# Patient Record
Sex: FEMALE | Race: WHITE | NOT HISPANIC OR LATINO | URBAN - METROPOLITAN AREA
[De-identification: names, ages, dates, MRNs, and addresses within clinical notes are randomized per-mention and may not be internally consistent; named-entity substitution may affect disease eponyms.]

---

## 2017-10-17 ENCOUNTER — INPATIENT (INPATIENT)
Facility: HOSPITAL | Age: 82
LOS: 1 days | Discharge: HOME | End: 2017-10-19
Attending: HOSPITALIST | Admitting: HOSPITALIST
Payer: MEDICARE

## 2017-10-17 VITALS
DIASTOLIC BLOOD PRESSURE: 45 MMHG | TEMPERATURE: 98 F | HEART RATE: 92 BPM | OXYGEN SATURATION: 98 % | SYSTOLIC BLOOD PRESSURE: 136 MMHG | RESPIRATION RATE: 18 BRPM

## 2017-10-17 DIAGNOSIS — I10 ESSENTIAL (PRIMARY) HYPERTENSION: ICD-10-CM

## 2017-10-17 DIAGNOSIS — E03.9 HYPOTHYROIDISM, UNSPECIFIED: ICD-10-CM

## 2017-10-17 DIAGNOSIS — Z29.9 ENCOUNTER FOR PROPHYLACTIC MEASURES, UNSPECIFIED: ICD-10-CM

## 2017-10-17 DIAGNOSIS — D63.0 ANEMIA IN NEOPLASTIC DISEASE: ICD-10-CM

## 2017-10-17 DIAGNOSIS — D64.9 ANEMIA, UNSPECIFIED: ICD-10-CM

## 2017-10-17 DIAGNOSIS — Z90.5 ACQUIRED ABSENCE OF KIDNEY: Chronic | ICD-10-CM

## 2017-10-17 LAB
ALBUMIN SERPL ELPH-MCNC: 3.7 G/DL — SIGNIFICANT CHANGE UP (ref 3.3–5)
ALP SERPL-CCNC: 67 U/L — SIGNIFICANT CHANGE UP (ref 40–120)
ALT FLD-CCNC: 15 U/L — SIGNIFICANT CHANGE UP (ref 4–33)
ANISOCYTOSIS BLD QL: SLIGHT — SIGNIFICANT CHANGE UP
APPEARANCE UR: CLEAR — SIGNIFICANT CHANGE UP
APTT BLD: 24.7 SEC — LOW (ref 27.5–37.4)
AST SERPL-CCNC: 16 U/L — SIGNIFICANT CHANGE UP (ref 4–32)
BASOPHILS # BLD AUTO: 0.02 K/UL — SIGNIFICANT CHANGE UP (ref 0–0.2)
BASOPHILS NFR BLD AUTO: 0.3 % — SIGNIFICANT CHANGE UP (ref 0–2)
BILIRUB SERPL-MCNC: 0.3 MG/DL — SIGNIFICANT CHANGE UP (ref 0.2–1.2)
BILIRUB UR-MCNC: NEGATIVE — SIGNIFICANT CHANGE UP
BLD GP AB SCN SERPL QL: NEGATIVE — SIGNIFICANT CHANGE UP
BLOOD UR QL VISUAL: NEGATIVE — SIGNIFICANT CHANGE UP
BUN SERPL-MCNC: 23 MG/DL — SIGNIFICANT CHANGE UP (ref 7–23)
CALCIUM SERPL-MCNC: 8.6 MG/DL — SIGNIFICANT CHANGE UP (ref 8.4–10.5)
CHLORIDE SERPL-SCNC: 101 MMOL/L — SIGNIFICANT CHANGE UP (ref 98–107)
CO2 SERPL-SCNC: 21 MMOL/L — LOW (ref 22–31)
COLOR SPEC: SIGNIFICANT CHANGE UP
CREAT SERPL-MCNC: 0.73 MG/DL — SIGNIFICANT CHANGE UP (ref 0.5–1.3)
ELLIPTOCYTES BLD QL SMEAR: SLIGHT — SIGNIFICANT CHANGE UP
EOSINOPHIL # BLD AUTO: 0.2 K/UL — SIGNIFICANT CHANGE UP (ref 0–0.5)
EOSINOPHIL NFR BLD AUTO: 2.8 % — SIGNIFICANT CHANGE UP (ref 0–6)
FERRITIN SERPL-MCNC: 7.46 NG/ML — LOW (ref 15–150)
GLUCOSE SERPL-MCNC: 83 MG/DL — SIGNIFICANT CHANGE UP (ref 70–99)
GLUCOSE UR-MCNC: NEGATIVE — SIGNIFICANT CHANGE UP
HCT VFR BLD CALC: 21 % — CRITICAL LOW (ref 34.5–45)
HGB BLD-MCNC: 5.6 G/DL — CRITICAL LOW (ref 11.5–15.5)
HYPOCHROMIA BLD QL: SIGNIFICANT CHANGE UP
IMM GRANULOCYTES # BLD AUTO: 0.05 # — SIGNIFICANT CHANGE UP
IMM GRANULOCYTES NFR BLD AUTO: 0.7 % — SIGNIFICANT CHANGE UP (ref 0–1.5)
INR BLD: 1.04 — SIGNIFICANT CHANGE UP (ref 0.88–1.17)
IRON SATN MFR SERPL: 10 UG/DL — LOW (ref 30–160)
IRON SATN MFR SERPL: 427 UG/DL — SIGNIFICANT CHANGE UP (ref 140–530)
KETONES UR-MCNC: NEGATIVE — SIGNIFICANT CHANGE UP
LEUKOCYTE ESTERASE UR-ACNC: SIGNIFICANT CHANGE UP
LYMPHOCYTES # BLD AUTO: 1.38 K/UL — SIGNIFICANT CHANGE UP (ref 1–3.3)
LYMPHOCYTES # BLD AUTO: 19 % — SIGNIFICANT CHANGE UP (ref 13–44)
MANUAL SMEAR VERIFICATION: SIGNIFICANT CHANGE UP
MCHC RBC-ENTMCNC: 17.8 PG — LOW (ref 27–34)
MCHC RBC-ENTMCNC: 26.7 % — LOW (ref 32–36)
MCV RBC AUTO: 66.7 FL — LOW (ref 80–100)
MICROCYTES BLD QL: SIGNIFICANT CHANGE UP
MONOCYTES # BLD AUTO: 0.71 K/UL — SIGNIFICANT CHANGE UP (ref 0–0.9)
MONOCYTES NFR BLD AUTO: 9.8 % — SIGNIFICANT CHANGE UP (ref 2–14)
MUCOUS THREADS # UR AUTO: SIGNIFICANT CHANGE UP
NEUTROPHILS # BLD AUTO: 4.91 K/UL — SIGNIFICANT CHANGE UP (ref 1.8–7.4)
NEUTROPHILS NFR BLD AUTO: 67.4 % — SIGNIFICANT CHANGE UP (ref 43–77)
NITRITE UR-MCNC: NEGATIVE — SIGNIFICANT CHANGE UP
NRBC # FLD: 0 — SIGNIFICANT CHANGE UP
NT-PROBNP SERPL-SCNC: 486.4 PG/ML — SIGNIFICANT CHANGE UP
PH UR: 6.5 — SIGNIFICANT CHANGE UP (ref 4.6–8)
PLATELET # BLD AUTO: 298 K/UL — SIGNIFICANT CHANGE UP (ref 150–400)
PLATELET COUNT - ESTIMATE: NORMAL — SIGNIFICANT CHANGE UP
PMV BLD: 10.5 FL — SIGNIFICANT CHANGE UP (ref 7–13)
POLYCHROMASIA BLD QL SMEAR: SLIGHT — SIGNIFICANT CHANGE UP
POTASSIUM SERPL-MCNC: 4.4 MMOL/L — SIGNIFICANT CHANGE UP (ref 3.5–5.3)
POTASSIUM SERPL-SCNC: 4.4 MMOL/L — SIGNIFICANT CHANGE UP (ref 3.5–5.3)
PROT SERPL-MCNC: 6.8 G/DL — SIGNIFICANT CHANGE UP (ref 6–8.3)
PROT UR-MCNC: NEGATIVE — SIGNIFICANT CHANGE UP
PROTHROM AB SERPL-ACNC: 11.7 SEC — SIGNIFICANT CHANGE UP (ref 9.8–13.1)
RBC # BLD: 3.15 M/UL — LOW (ref 3.8–5.2)
RBC # FLD: 18.4 % — HIGH (ref 10.3–14.5)
RBC CASTS # UR COMP ASSIST: SIGNIFICANT CHANGE UP (ref 0–?)
RETICS #: 0.1 10X6/UL — HIGH (ref 0.02–0.07)
RETICS/RBC NFR: 2.3 % — SIGNIFICANT CHANGE UP (ref 0.5–2.5)
RH IG SCN BLD-IMP: POSITIVE — SIGNIFICANT CHANGE UP
SCHISTOCYTES BLD QL AUTO: SLIGHT — SIGNIFICANT CHANGE UP
SODIUM SERPL-SCNC: 139 MMOL/L — SIGNIFICANT CHANGE UP (ref 135–145)
SP GR SPEC: 1.01 — SIGNIFICANT CHANGE UP (ref 1–1.03)
SQUAMOUS # UR AUTO: SIGNIFICANT CHANGE UP
TROPONIN T SERPL-MCNC: < 0.06 NG/ML — SIGNIFICANT CHANGE UP (ref 0–0.06)
UIBC SERPL-MCNC: 417 UG/DL — HIGH (ref 110–370)
UROBILINOGEN FLD QL: NORMAL E.U. — SIGNIFICANT CHANGE UP (ref 0.1–0.2)
WBC # BLD: 7.27 K/UL — SIGNIFICANT CHANGE UP (ref 3.8–10.5)
WBC # FLD AUTO: 7.27 K/UL — SIGNIFICANT CHANGE UP (ref 3.8–10.5)
WBC UR QL: HIGH (ref 0–?)

## 2017-10-17 PROCEDURE — 71010: CPT | Mod: 26

## 2017-10-17 PROCEDURE — 99223 1ST HOSP IP/OBS HIGH 75: CPT

## 2017-10-17 RX ORDER — LEVOTHYROXINE SODIUM 125 MCG
88 TABLET ORAL DAILY
Qty: 0 | Refills: 0 | Status: DISCONTINUED | OUTPATIENT
Start: 2017-10-17 | End: 2017-10-19

## 2017-10-17 RX ORDER — PANTOPRAZOLE SODIUM 20 MG/1
40 TABLET, DELAYED RELEASE ORAL
Qty: 0 | Refills: 0 | Status: DISCONTINUED | OUTPATIENT
Start: 2017-10-17 | End: 2017-10-18

## 2017-10-17 NOTE — ED PROVIDER NOTE - OBJECTIVE STATEMENT
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 Ornelas ([facility]) for Hgb of 3.5. Denies chest pain, occasional palpitations s/p using inhaler. Has had anemia in past (~1 yr ago), was transfused at that time. +Lightheadedness, intermittent. No headache, abd pain. No dysuria or hematuria. Pt did note black stool one week ago, none since then (stool guiac at PMD neg). 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 Ornelas ([facility]) for Hgb of 3.5. Denies chest pain, occasional palpitations s/p using inhaler. Has had anemia in past (~1 yr ago), was transfused at that time. +Lightheadedness, intermittent. No headache, abd pain. No dysuria or hematuria. Pt did note black stool one week ago, none since then (stool guiac at PMD neg). Intermittent calf and shoulder "aches," no unilateral pain, no hx of PE or DVT, no LE swelling.

## 2017-10-17 NOTE — H&P ADULT - PROBLEM SELECTOR PLAN 1
likely multifactorial  - added reticulocytes, iron, TIBC, ferritin to previous labs  - recheck CBC in am after 2 units PRBC transfusion

## 2017-10-17 NOTE — H&P ADULT - NSHPLABSRESULTS_GEN_ALL_CORE
10-17    139  |  101  |  23  ----------------------------<  83  4.4   |  21<L>  |  0.73    Ca    8.6      17 Oct 2017 17:32    TPro  6.8  /  Alb  3.7  /  TBili  0.3  /  DBili  x   /  AST  16  /  ALT  15  /  AlkPhos  67  10-17                            5.6    7.27  )-----------( 298      ( 17 Oct 2017 17:32 )             21.0       CARDIAC MARKERS ( 17 Oct 2017 17:32 )  x     / < 0.06 ng/mL / x     / x     / x            PT/INR - ( 17 Oct 2017 17:32 )   PT: 11.7 SEC;   INR: 1.04          PTT - ( 17 Oct 2017 17:32 )  PTT:24.7 SEC

## 2017-10-17 NOTE — H&P ADULT - HISTORY OF PRESENT ILLNESS
80 y/o F with hypothyroidism, HTN, and RCC s/p L nephrectomy in 2007, and recent lung mets not on active treatment p/w fatigue.  Pt reports 1 month of worsening generalized weakness and fatigue.  Symptoms have been worse in the past few days along with some dyspnea.  Pt has not had chest pain or, lightheadedness.  Had 1 episode of dark stool a few days ago, but none further.  No hematuria or other bleeding.  Has had similar symptoms in the setting of anemia in the past 2 years.  Pt saw her PMD earlier today, and had labs checked which showed anemia.      In ED, pt had Hgb of 5.9, and transfusion with 2u PRBC initiated.

## 2017-10-17 NOTE — ED ADULT TRIAGE NOTE - CHIEF COMPLAINT QUOTE
Pt sent from doctor's office for hemoglobin of 3. States she feels very weak and sob. Hx of blood transfusions in past. Denies any active bleeding.

## 2017-10-17 NOTE — ED ADULT NURSE REASSESSMENT NOTE - NS ED NURSE REASSESS COMMENT FT1
pt tolerating blood transfusion without any adverse reactions. safety maintained. vs as noted. pt denies any changes in condition or symptoms. family remains at bedside. report to AMADA Montague.
1st unit PRBC's initiated to transfuse over 3 hours, unit number [id number]. pt educated on s/s of transfusion to report to RN. pt verbalized understanding. ivl site remains clean, dry, intact, patent. safety maintained. cardiac monitor in place in nsr. family at bedside. will continue to observe

## 2017-10-17 NOTE — H&P ADULT - ASSESSMENT
80 y/o F with HTN, hypothyroidism, and RCC with lung mets p/w fatigue and dyspnea in the setting of anemia.

## 2017-10-17 NOTE — H&P ADULT - NSHPPHYSICALEXAM_GEN_ALL_CORE
Vital Signs Last 24 Hrs  T(C): 36.9 (17 Oct 2017 20:40), Max: 36.9 (17 Oct 2017 20:25)  T(F): 98.5 (17 Oct 2017 20:40), Max: 98.5 (17 Oct 2017 20:40)  HR: 90 (17 Oct 2017 20:40) (89 - 92)  BP: 123/62 (17 Oct 2017 20:40) (123/62 - 176/46)  BP(mean): --  RR: 18 (17 Oct 2017 20:40) (18 - 18)  SpO2: 100% (17 Oct 2017 20:40) (97% - 100%)    PHYSICAL EXAM:  GENERAL: No Acute Distress  HEAD:  Atraumatic, Normocephalic  EYES: conjunctiva and sclera clear  ENMT: Moist mucous membranes   NECK: Supple  CHEST/LUNG: Clear to auscultation bilaterally  HEART: Regular rate and rhythm; No murmurs, rubs, or gallops  ABDOMEN: Soft, Nontender, Nondistended; Bowel sounds normal  EXTREMITIES:   No clubbing, cyanosis, or pedal edema  VASCULAR: Normal DP pulses  PSYCH: Normal Affect, Normal Behavior  NEUROLOGY:   - Mental status A&O x 3,  SKIN: No rashes or lesions  MUSCULOSKELETAL: No joint swelling

## 2017-10-17 NOTE — ED ADULT NURSE NOTE - OBJECTIVE STATEMENT
received pt to room 16 sent by PMD for hgb of 3, sent for blood transfusion with c/o generalized weakness, fatigue, dyspnea on exertion x 1 month, worsening. pt presents awake a&ox4, denies dizziness or ha. skin warm, dry, appropriate for race. respirations even unlabored. denies cp. endorses sob with exertion and fatiguing easily. abdomen soft nontender nondistended. denies n/v/d denies fevers or chills. ivl placed, bloods drawn and sent. cardiac monitor in place in nsr. safety maintained. will continue to observe.

## 2017-10-17 NOTE — H&P ADULT - NSHPREVIEWOFSYSTEMS_GEN_ALL_CORE
REVIEW OF SYSTEMS    General:  weakness, fatigue  Skin/Breast:  Negative  Ophthalmologic:  Negative  ENMT:  Negative  Respiratory and Thorax:  dyspnea  Cardiovascular:  Negative  Gastrointestinal:  dark stool  Genitourinary:  Negative  Musculoskeletal:  Negative  Neurological:  Negative  Psychiatric:  Negative  Hematology/Lymphatics:  Negative	  Endocrine:	  Negative  Allergic/Immunologic:	 Negative

## 2017-10-17 NOTE — ED PROVIDER NOTE - PHYSICAL EXAMINATION
Attending Mitzy PE: NAD, NCAT, MMM, Trachea midline, +Conjunctival pallor, lungs CTAB, S1/S2 RRR, 2+ rad pulses bilat, Abdomen Soft, NTND, No rebound/guarding, No LE edema, No deformity of extremities, No rashes,  No focal motor or sensory deficits.

## 2017-10-17 NOTE — ED PROVIDER NOTE - MEDICAL DECISION MAKING DETAILS
81F with pmh RCC and anemia presents for Hgb 3.5, weakness, SOB. With occasional palpitations but no CP. Dark stool last week none since. Sx consistent with symptomatic anemia, will check labs for electrolyte abnormality, check troponin and probnp, coags, type and screen, perform rectal exam and guiac, transfuse as indicated. much lower suspicion of acs, chf, pneumonia, PE. plan for likely admit. 81F with pmh RCC and anemia presents for Hgb 3.5, weakness, SOB. With occasional palpitations but no CP. Dark stool last week none since. Sx consistent with symptomatic anemia, will check labs for electrolyte abnormality, check troponin and probnp, coags, type and screen, perform rectal exam and guiac, transfuse as indicated. much lower suspicion of acs, chf, no chest pain, pneumonia, PE. plan for likely admit. - Mic Forrester MD

## 2017-10-18 DIAGNOSIS — K92.1 MELENA: ICD-10-CM

## 2017-10-18 DIAGNOSIS — D50.8 OTHER IRON DEFICIENCY ANEMIAS: ICD-10-CM

## 2017-10-18 DIAGNOSIS — C64.9 MALIGNANT NEOPLASM OF UNSPECIFIED KIDNEY, EXCEPT RENAL PELVIS: ICD-10-CM

## 2017-10-18 LAB
BUN SERPL-MCNC: 19 MG/DL — SIGNIFICANT CHANGE UP (ref 7–23)
CALCIUM SERPL-MCNC: 8.3 MG/DL — LOW (ref 8.4–10.5)
CHLORIDE SERPL-SCNC: 105 MMOL/L — SIGNIFICANT CHANGE UP (ref 98–107)
CO2 SERPL-SCNC: 23 MMOL/L — SIGNIFICANT CHANGE UP (ref 22–31)
CREAT SERPL-MCNC: 0.7 MG/DL — SIGNIFICANT CHANGE UP (ref 0.5–1.3)
GLUCOSE SERPL-MCNC: 90 MG/DL — SIGNIFICANT CHANGE UP (ref 70–99)
HCT VFR BLD CALC: 28.1 % — LOW (ref 34.5–45)
HCT VFR BLD CALC: 28.7 % — LOW (ref 34.5–45)
HGB BLD-MCNC: 8.3 G/DL — LOW (ref 11.5–15.5)
HGB BLD-MCNC: 8.5 G/DL — LOW (ref 11.5–15.5)
MAGNESIUM SERPL-MCNC: 2 MG/DL — SIGNIFICANT CHANGE UP (ref 1.6–2.6)
MCHC RBC-ENTMCNC: 20.5 PG — LOW (ref 27–34)
MCHC RBC-ENTMCNC: 21 PG — LOW (ref 27–34)
MCHC RBC-ENTMCNC: 28.9 % — LOW (ref 32–36)
MCHC RBC-ENTMCNC: 30.2 % — LOW (ref 32–36)
MCV RBC AUTO: 69.4 FL — LOW (ref 80–100)
MCV RBC AUTO: 71 FL — LOW (ref 80–100)
NRBC # FLD: 0 — SIGNIFICANT CHANGE UP
NRBC # FLD: 0 — SIGNIFICANT CHANGE UP
OB PNL STL: POSITIVE — SIGNIFICANT CHANGE UP
PLATELET # BLD AUTO: 286 K/UL — SIGNIFICANT CHANGE UP (ref 150–400)
PLATELET # BLD AUTO: 291 K/UL — SIGNIFICANT CHANGE UP (ref 150–400)
PMV BLD: 10.4 FL — SIGNIFICANT CHANGE UP (ref 7–13)
PMV BLD: 10.4 FL — SIGNIFICANT CHANGE UP (ref 7–13)
POTASSIUM SERPL-MCNC: 4.4 MMOL/L — SIGNIFICANT CHANGE UP (ref 3.5–5.3)
POTASSIUM SERPL-SCNC: 4.4 MMOL/L — SIGNIFICANT CHANGE UP (ref 3.5–5.3)
RBC # BLD: 4.04 M/UL — SIGNIFICANT CHANGE UP (ref 3.8–5.2)
RBC # BLD: 4.05 M/UL — SIGNIFICANT CHANGE UP (ref 3.8–5.2)
RBC # FLD: 19.4 % — HIGH (ref 10.3–14.5)
RBC # FLD: 19.6 % — HIGH (ref 10.3–14.5)
SODIUM SERPL-SCNC: 141 MMOL/L — SIGNIFICANT CHANGE UP (ref 135–145)
WBC # BLD: 7.28 K/UL — SIGNIFICANT CHANGE UP (ref 3.8–10.5)
WBC # BLD: 8.15 K/UL — SIGNIFICANT CHANGE UP (ref 3.8–10.5)
WBC # FLD AUTO: 7.28 K/UL — SIGNIFICANT CHANGE UP (ref 3.8–10.5)
WBC # FLD AUTO: 8.15 K/UL — SIGNIFICANT CHANGE UP (ref 3.8–10.5)

## 2017-10-18 RX ORDER — PANTOPRAZOLE SODIUM 20 MG/1
40 TABLET, DELAYED RELEASE ORAL
Qty: 0 | Refills: 0 | Status: DISCONTINUED | OUTPATIENT
Start: 2017-10-18 | End: 2017-10-19

## 2017-10-18 RX ORDER — IRON SUCROSE 20 MG/ML
200 INJECTION, SOLUTION INTRAVENOUS ONCE
Qty: 0 | Refills: 0 | Status: COMPLETED | OUTPATIENT
Start: 2017-10-18 | End: 2017-10-18

## 2017-10-18 RX ADMIN — PANTOPRAZOLE SODIUM 40 MILLIGRAM(S): 20 TABLET, DELAYED RELEASE ORAL at 17:28

## 2017-10-18 RX ADMIN — Medication 88 MICROGRAM(S): at 06:05

## 2017-10-18 RX ADMIN — PANTOPRAZOLE SODIUM 40 MILLIGRAM(S): 20 TABLET, DELAYED RELEASE ORAL at 06:05

## 2017-10-18 RX ADMIN — IRON SUCROSE 110 MILLIGRAM(S): 20 INJECTION, SOLUTION INTRAVENOUS at 17:28

## 2017-10-18 NOTE — PROGRESS NOTE ADULT - PROBLEM SELECTOR PLAN 3
Diagnosed 2007. s/p nephrectomy. With recurrent and lung metastatics 2009.   Did not tolerate chemo, decided to be conservative. ( passed away and she did not want to go through chemo).   Doesn't want RT or chemo at this time. Requests conservative treatment.  She will f/u with Dr. Escobar (PCP).

## 2017-10-18 NOTE — PROGRESS NOTE ADULT - SUBJECTIVE AND OBJECTIVE BOX
Patient is a 81y old  Female who presents with a chief complaint of fatigue (17 Oct 2017 21:17)      SUBJECTIVE / OVERNIGHT EVENTS:  Pt seen and examined at bedside. Feels well. s/p PRBC transfusion.   No chest pain, no shortness of breath.   No overnight event.   No N/V/D. No abdominal pain.   + melena a few days ago. no recent BM.       Vital Signs Last 24 Hrs  T(C): 36.7 (18 Oct 2017 11:01), Max: 36.9 (17 Oct 2017 20:25)  T(F): 98 (18 Oct 2017 11:01), Max: 98.5 (17 Oct 2017 20:40)  HR: 81 (18 Oct 2017 11:01) (81 - 94)  BP: 130/54 (18 Oct 2017 11:01) (115/68 - 176/46)  BP(mean): --  RR: 18 (18 Oct 2017 11:01) (18 - 18)  SpO2: 99% (18 Oct 2017 11:01) (95% - 100%)  I&O's Summary    17 Oct 2017 07:01  -  18 Oct 2017 07:00  --------------------------------------------------------  IN: 0 mL / OUT: 0 mL / NET: 0 mL    18 Oct 2017 07:01  -  18 Oct 2017 12:11  --------------------------------------------------------  IN: 0 mL / OUT: 251 mL / NET: -251 mL        PHYSICAL EXAM:  GENERAL: NAD, Comfortable  HEAD:  Atraumatic, Normocephalic  EYES: EOMI, PERRLA, conjunctiva and sclera clear  NECK: Supple, No JVD  CHEST/LUNG: Clear to auscultation bilaterally; No wheeze  HEART: Regular rate and rhythm; No murmurs, rubs, or gallops  ABDOMEN: Soft, Nontender, Nondistended; Bowel sounds present  Neuro: AAO x 3, no focal deficit, 5/5 b/l extremities  EXTREMITIES:  2+ Peripheral Pulses, No clubbing, cyanosis, or edema  SKIN: No rashes or lesions    LABS:                        8.3    7.28  )-----------( 291      ( 18 Oct 2017 07:30 )             28.7     10-    141  |  105  |  19  ----------------------------<  90  4.4   |  23  |  0.70    Ca    8.3<L>      18 Oct 2017 07:30  Mg     2.0     10-18    TPro  6.8  /  Alb  3.7  /  TBili  0.3  /  DBili  x   /  AST  16  /  ALT  15  /  AlkPhos  67  10-17    PT/INR - ( 17 Oct 2017 17:32 )   PT: 11.7 SEC;   INR: 1.04          PTT - ( 17 Oct 2017 17:32 )  PTT:24.7 SEC  CAPILLARY BLOOD GLUCOSE        CARDIAC MARKERS ( 17 Oct 2017 17:32 )  x     / < 0.06 ng/mL / x     / x     / x          Urinalysis Basic - ( 17 Oct 2017 23:09 )    Color: PLYEL / Appearance: CLEAR / S.012 / pH: 6.5  Gluc: NEGATIVE / Ketone: NEGATIVE  / Bili: NEGATIVE / Urobili: NORMAL E.U.   Blood: NEGATIVE / Protein: NEGATIVE / Nitrite: NEGATIVE   Leuk Esterase: TRACE / RBC: 0-2 / WBC 5-10   Sq Epi: OCC / Non Sq Epi: x / Bacteria: x        RADIOLOGY & ADDITIONAL TESTS:    Imaging Personally Reviewed:  [x] YES  [ ] NO    Consultant(s) Notes Reviewed:  [x] YES  [ ] NO      MEDICATIONS  (STANDING):  levothyroxine 88 MICROGram(s) Oral daily  pantoprazole    Tablet 40 milliGRAM(s) Oral before breakfast    MEDICATIONS  (PRN):      Care Discussed with Consultants/Other Providers [x] YES  [ ] NO    HEALTH ISSUES - PROBLEM Dx:  Prophylactic measure: Prophylactic measure  Essential (primary) hypertension: Essential (primary) hypertension  Hypothyroidism, unspecified type: Hypothyroidism, unspecified type  Anemia in neoplastic disease: Anemia in neoplastic disease

## 2017-10-18 NOTE — PROGRESS NOTE ADULT - PROBLEM SELECTOR PLAN 1
likely multifactorial  - very iron deficient. Will give IV iron.   - Post transfusion CBC appropriate.  - Hx of ulcer. +occult stool. Will consult GI Dr. Reynolds.

## 2017-10-18 NOTE — CONSULT NOTE ADULT - PROBLEM SELECTOR RECOMMENDATION 2
- Diagnosed 2007. s/p nephrectomy. With recurrent and lung metastatics 2009.   - Did not tolerate chemo, decided to be conservative  - supportive care - noted on lab work   - IV iron while admitted  - consider heme eval

## 2017-10-18 NOTE — CONSULT NOTE ADULT - PROBLEM SELECTOR RECOMMENDATION 9
- pt has h/o Gastric Ulcer on EGD 1 year ago   - recent episode of melena as outpatient  - s/p 2 units PRBC during admission; daily cbc  - monitor stool color for further episodes  - protonix 40mg IVP BID; cahnge to PO on d/c  - carafate qid  - avoid NSAIDS  - NPO p MN for EGD tomorrow, 10/19 with Dr. Reynolds

## 2017-10-18 NOTE — CONSULT NOTE ADULT - ASSESSMENT
82 y/o F with hypothyroidism, HTN, and RCC s/p L nephrectomy in 2007, and recent lung mets not on active treatment p/w fatigue and reported 1 episode of melena about a week ago after increased ibuprofen She reports that she had this one other time about 1 year ago for which she had an EGD about 1 year ago and was noted to have a gastric ulcer

## 2017-10-18 NOTE — CONSULT NOTE ADULT - PROBLEM SELECTOR RECOMMENDATION 3
- Diagnosed 2007. s/p nephrectomy. With recurrent and lung metastatics 2009.   - Did not tolerate chemo, decided to be conservative  - supportive care

## 2017-10-18 NOTE — CONSULT NOTE ADULT - SUBJECTIVE AND OBJECTIVE BOX
Chief Complaint:  Patient is a 81y old  Female who presents with a chief complaint of fatigue (17 Oct 2017 21:17)    Essential (primary) hypertension  Hypothyroidism, unspecified type  Renal cancer  History of nephrectomy     HPI:  80 y/o F with hypothyroidism, HTN, and RCC s/p L nephrectomy in , and recent lung mets not on active treatment p/w fatigue.  Pt reports 1 month of worsening generalized weakness and fatigue.  Symptoms have been worse in the past few days along with some dyspnea.  Pt has not had chest pain or, lightheadedness.  Had 1 episode of dark stool a few days ago, but none further. She reports increased use of Ibuprofen x 3-4 days about 1 weeks ago and that is when she noted the black stools. She reports that she had this one other time about 1 year ago for which she had an EGD about 1 year ago and was noted to have a gastric ulcer. denies n/v/d/c, abdominal pain, brbpr.     In ED, pt had Hgb of 5.9, and transfusion with 2u PRBC initiated. (17 Oct 2017 21:17)      No Known Allergies      iron sucrose IVPB 200 milliGRAM(s) IV Intermittent once  levothyroxine 88 MICROGram(s) Oral daily  pantoprazole    Tablet 40 milliGRAM(s) Oral before breakfast        FAMILY HISTORY:  No pertinent family history in first degree relatives        Review of Systems:    General:  No wt loss, fevers, chills, night sweats, fatigue  Eyes:  Good vision, no reported pain  ENT:  No sore throat, pain, runny nose, dysphagia  CV:  No pain, palpitations, no lightheadedness  Resp:  No dyspnea, cough, tachypnea, wheezing  GI: melena; denies n/v/d/c, abdominal pain, brbpr   :  No pain, bleeding, incontinence, nocturia  Muscle:  No pain, weakness  Neuro:  No weakness, tingling, memory problems  Psych:  No fatigue, insomnia, mood problems, depression  Endocrine:  No polyuria, polydypsia, cold/heat intolerance  Heme:  No petechiae, ecchymosis, easy bruisability  Skin:  No rash, tattoos, scars, edema    Relevant Family History:   n/c    Relevant Social History: n/c      Physical Exam:    Vital Signs:  Vital Signs Last 24 Hrs  T(C): 36.7 (18 Oct 2017 11:01), Max: 36.9 (17 Oct 2017 20:25)  T(F): 98 (18 Oct 2017 11:01), Max: 98.5 (17 Oct 2017 20:40)  HR: 81 (18 Oct 2017 11:01) (81 - 94)  BP: 130/54 (18 Oct 2017 11:01) (115/68 - 176/46)  BP(mean): --  RR: 18 (18 Oct 2017 11:01) (18 - 18)  SpO2: 99% (18 Oct 2017 11:01) (95% - 100%)  Daily Height in cm: 149.86 (17 Oct 2017 22:55)    Daily     General:  Appears stated age, well-groomed, nad  HEENT:  NC/AT,  conjunctivae clear and pink, no thyromegaly, nodules, adenopathy, no JVD  Chest:  Full & symmetric excursion, no increased effort, breath sounds clear  Cardiovascular:  Regular rhythm, S1, S2, no murmur/rub/S3/S4, no abdominal bruit, no edema  Abdomen:  Soft, non-tender, non-distended, normoactive bowel sounds,  no masses ,no hepatosplenomeagaly, no signs of chronic liver disease  Extremities:  no cyanosis,clubbing or edema  Skin:  No rash/erythema/ecchymoses/petechiae/wounds/abscess/warm/dry  Neuro/Psych:  A&Ox3  , no asterixis, no tremor, no encephalopathy    Laboratory:                            8.3    7.28  )-----------( 291      ( 18 Oct 2017 07:30 )             28.7     10-18    141  |  105  |  19  ----------------------------<  90  4.4   |  23  |  0.70    Ca    8.3<L>      18 Oct 2017 07:30  Mg     2.0     10-18    TPro  6.8  /  Alb  3.7  /  TBili  0.3  /  DBili  x   /  AST  16  /  ALT  15  /  AlkPhos  67  10-17    LIVER FUNCTIONS - ( 17 Oct 2017 17:32 )  Alb: 3.7 g/dL / Pro: 6.8 g/dL / ALK PHOS: 67 u/L / ALT: 15 u/L / AST: 16 u/L / GGT: x           PT/INR - ( 17 Oct 2017 17:32 )   PT: 11.7 SEC;   INR: 1.04          PTT - ( 17 Oct 2017 17:32 )  PTT:24.7 SEC  Urinalysis Basic - ( 17 Oct 2017 23:09 )    Color: PLYEL / Appearance: CLEAR / S.012 / pH: 6.5  Gluc: NEGATIVE / Ketone: NEGATIVE  / Bili: NEGATIVE / Urobili: NORMAL E.U.   Blood: NEGATIVE / Protein: NEGATIVE / Nitrite: NEGATIVE   Leuk Esterase: TRACE / RBC: 0-2 / WBC 5-10   Sq Epi: OCC / Non Sq Epi: x / Bacteria: x        Imaging:

## 2017-10-19 ENCOUNTER — RESULT REVIEW (OUTPATIENT)
Age: 82
End: 2017-10-19

## 2017-10-19 VITALS
OXYGEN SATURATION: 95 % | TEMPERATURE: 98 F | HEART RATE: 83 BPM | DIASTOLIC BLOOD PRESSURE: 51 MMHG | SYSTOLIC BLOOD PRESSURE: 132 MMHG | RESPIRATION RATE: 20 BRPM

## 2017-10-19 LAB
BUN SERPL-MCNC: 19 MG/DL — SIGNIFICANT CHANGE UP (ref 7–23)
CALCIUM SERPL-MCNC: 8.4 MG/DL — SIGNIFICANT CHANGE UP (ref 8.4–10.5)
CHLORIDE SERPL-SCNC: 104 MMOL/L — SIGNIFICANT CHANGE UP (ref 98–107)
CO2 SERPL-SCNC: 22 MMOL/L — SIGNIFICANT CHANGE UP (ref 22–31)
CREAT SERPL-MCNC: 0.82 MG/DL — SIGNIFICANT CHANGE UP (ref 0.5–1.3)
GLUCOSE SERPL-MCNC: 94 MG/DL — SIGNIFICANT CHANGE UP (ref 70–99)
HCT VFR BLD CALC: 28.5 % — LOW (ref 34.5–45)
HGB BLD-MCNC: 8.5 G/DL — LOW (ref 11.5–15.5)
MCHC RBC-ENTMCNC: 20.9 PG — LOW (ref 27–34)
MCHC RBC-ENTMCNC: 29.8 % — LOW (ref 32–36)
MCV RBC AUTO: 70 FL — LOW (ref 80–100)
NRBC # FLD: 0 — SIGNIFICANT CHANGE UP
PLATELET # BLD AUTO: 261 K/UL — SIGNIFICANT CHANGE UP (ref 150–400)
PMV BLD: 10.2 FL — SIGNIFICANT CHANGE UP (ref 7–13)
POTASSIUM SERPL-MCNC: 4.4 MMOL/L — SIGNIFICANT CHANGE UP (ref 3.5–5.3)
POTASSIUM SERPL-SCNC: 4.4 MMOL/L — SIGNIFICANT CHANGE UP (ref 3.5–5.3)
RBC # BLD: 4.07 M/UL — SIGNIFICANT CHANGE UP (ref 3.8–5.2)
RBC # FLD: 20.1 % — HIGH (ref 10.3–14.5)
SODIUM SERPL-SCNC: 139 MMOL/L — SIGNIFICANT CHANGE UP (ref 135–145)
WBC # BLD: 7.4 K/UL — SIGNIFICANT CHANGE UP (ref 3.8–10.5)
WBC # FLD AUTO: 7.4 K/UL — SIGNIFICANT CHANGE UP (ref 3.8–10.5)

## 2017-10-19 PROCEDURE — 88312 SPECIAL STAINS GROUP 1: CPT | Mod: 26

## 2017-10-19 PROCEDURE — 88305 TISSUE EXAM BY PATHOLOGIST: CPT | Mod: 26

## 2017-10-19 RX ORDER — FERROUS SULFATE 325(65) MG
1 TABLET ORAL
Qty: 30 | Refills: 0 | OUTPATIENT
Start: 2017-10-19 | End: 2017-11-18

## 2017-10-19 RX ORDER — ESOMEPRAZOLE MAGNESIUM 40 MG/1
1 CAPSULE, DELAYED RELEASE ORAL
Qty: 0 | Refills: 0 | COMMUNITY

## 2017-10-19 RX ORDER — PANTOPRAZOLE SODIUM 20 MG/1
1 TABLET, DELAYED RELEASE ORAL
Qty: 60 | Refills: 0 | OUTPATIENT
Start: 2017-10-19 | End: 2017-11-18

## 2017-10-19 RX ADMIN — PANTOPRAZOLE SODIUM 40 MILLIGRAM(S): 20 TABLET, DELAYED RELEASE ORAL at 06:10

## 2017-10-19 RX ADMIN — Medication 88 MICROGRAM(S): at 06:10

## 2017-10-19 NOTE — PROGRESS NOTE ADULT - PROBLEM SELECTOR PLAN 3
Diagnosed 2007. s/p nephrectomy. With recurrent and lung metastatics 2009.   Did not tolerate chemo, decided to be conservative.   Doesn't want RT or chemo at this time. Requests conservative treatment.  She will f/u with Dr. Escobar (PCP).

## 2017-10-19 NOTE — DISCHARGE NOTE ADULT - SECONDARY DIAGNOSIS.
Hypothyroidism, unspecified type Renal cell cancer, unspecified laterality Essential (primary) hypertension

## 2017-10-19 NOTE — DISCHARGE NOTE ADULT - CARE PROVIDER_API CALL
Christopher Reynolds (DO), Gastroenterology; Internal Medicine  [address] [place] [id number]  Phone: [phone]  Fax: [phone]    Dr. Escobar,   Phone: [phone]  Fax: (   )    -

## 2017-10-19 NOTE — PROGRESS NOTE ADULT - PROBLEM SELECTOR PLAN 1
likely multifactorial  - very iron deficient. s/p IV iron. Will supplement via PO post procedure.   - Post transfusion CBC appropriate.  - Hx of ulcer. +occult stool. GI Dr. Reynolds.  - Plan for EGD today.

## 2017-10-19 NOTE — DISCHARGE NOTE ADULT - PLAN OF CARE
egd, stable h/h You received two units of PRBC in the hospital. You're hemoglobin was 8.5 on discharge. An EGD was done in the hospital where they found non-bleeding gastric ulcer, a biopsy was taken. Please follow up with Dr. Burger for biposy results and to get a repeat EGD in 8 weeks to assure healing of gastric ulcers. Continue Protonix and iron pills as prescribed.  Please follow up with Dr. Escobar within 1 week for repeat CBC to monitor hemoglobin level. continue synthroid. Follow up with Dr. Escobar [phone]. Continue Verapamil. Please follow up with Dr. Escobar within 1 week as your diastolic blood pressure was low.

## 2017-10-19 NOTE — DISCHARGE NOTE ADULT - MEDICATION SUMMARY - MEDICATIONS TO STOP TAKING
I will STOP taking the medications listed below when I get home from the hospital:    NexIUM 40 mg oral delayed release capsule  -- 1 cap(s) by mouth once a day, As Needed

## 2017-10-19 NOTE — DISCHARGE NOTE ADULT - HOSPITAL COURSE
82 y/o F with hypothyroidism, HTN, and RCC s/p L nephrectomy in 2007, and recent lung mets not on active treatment p/w fatigue, found to be anemic to 5.9    Hospital Course:     Anemia in neoplastic disease.    - likely multifactorial  - very iron deficient. Will give IV iron.   - Post transfusion CBC appropriate.  - Hx of ulcer. +occult stool.   -consulted GI Dr. Reynolds.   -   - Return patient to hospital cid for ongoing care - Resume regular diet.   - Use Protonix (pantoprazole) 40 mg PO BID.   - Await pathology results.   - Repeat the upper endoscopy in 2 months to check  healing.         Hypothyroidism, unspecified type.     - continue synthroid.       Renal cell cancer, unspecified laterality.    - Diagnosed 2007. s/p nephrectomy. With recurrent and lung metastatics 2009.   Did not tolerate chemo, decided to be conservative. ( passed away and she did not want to go through chemo).   -Doesn't want RT or chemo at this time. Requests conservative treatment.  -She will f/u with Dr. Escobar (PCP).      Essential (primary) hypertension.     - restart verapamil (home med).       Prophylactic measure.  Plan: SCD's.       Dispo: Home

## 2017-10-19 NOTE — DISCHARGE NOTE ADULT - MEDICATION SUMMARY - MEDICATIONS TO TAKE
I will START or STAY ON the medications listed below when I get home from the hospital:    verapamil 120 mg/24 hours oral capsule, extended release  -- 1 cap(s) by mouth once a day  -- Indication: For Essential (primary) hypertension    ferrous sulfate 325 mg (65 mg elemental iron) oral tablet  -- 1 tab(s) by mouth once a day   -- Check with your doctor before becoming pregnant.  Do not chew, break, or crush.  May discolor urine or feces.    -- Indication: For Anemia    Protonix 40 mg oral delayed release tablet  -- 1 tab(s) by mouth 2 times a day   -- It is very important that you take or use this exactly as directed.  Do not skip doses or discontinue unless directed by your doctor.  Obtain medical advice before taking any non-prescription drugs as some may affect the action of this medication.  Swallow whole.  Do not crush.    -- Indication: For Melena    Synthroid 88 mcg (0.088 mg) oral tablet  -- 1 tab(s) by mouth once a day  -- Indication: For Hypothyroidism, unspecified type

## 2017-10-19 NOTE — DISCHARGE NOTE ADULT - PATIENT PORTAL LINK FT
“You can access the [facility] Patient Portal, offered by [facility], by registering with the following website: [email or web address]”

## 2017-10-19 NOTE — PROGRESS NOTE ADULT - SUBJECTIVE AND OBJECTIVE BOX
Patient is a 81y old  Female who presents with a chief complaint of fatigue (17 Oct 2017 21:17)      SUBJECTIVE / OVERNIGHT EVENTS:  No overnight events.  Pt feels well.  Denied cp, sob, n/v/d.  no abdominal pain. No HA/dizziness.   No melena.       Vital Signs Last 24 Hrs  T(C): 36.8 (19 Oct 2017 09:20), Max: 37.3 (19 Oct 2017 02:00)  T(F): 98.3 (19 Oct 2017 09:20), Max: 99.2 (19 Oct 2017 02:00)  HR: 81 (19 Oct 2017 09:20) (74 - 85)  BP: 143/72 (19 Oct 2017 09:20) (130/54 - 145/55)  BP(mean): --  RR: 18 (19 Oct 2017 09:20) (17 - 20)  SpO2: 95% (19 Oct 2017 09:20) (95% - 99%)  I&O's Summary    18 Oct 2017 07:01  -  19 Oct 2017 07:00  --------------------------------------------------------  IN: 0 mL / OUT: 251 mL / NET: -251 mL        PHYSICAL EXAM:  GENERAL: NAD, Comfortable  HEAD:  Atraumatic, Normocephalic  EYES: EOMI, PERRLA, conjunctiva and sclera clear  NECK: Supple, No JVD  CHEST/LUNG: Clear to auscultation bilaterally; No wheeze  HEART: Regular rate and rhythm; No murmurs, rubs, or gallops  ABDOMEN: Soft, Nontender, Nondistended; Bowel sounds present  Neuro: AAO x 3, no focal deficit, 5/5 b/l extremities  EXTREMITIES:  2+ Peripheral Pulses, No clubbing, cyanosis, or edema  SKIN: No rashes or lesions    LABS:                        8.5    7.40  )-----------( 261      ( 19 Oct 2017 05:50 )             28.5     10-    139  |  104  |  19  ----------------------------<  94  4.4   |  22  |  0.82    Ca    8.4      19 Oct 2017 05:50  Mg     2.0     10-18    TPro  6.8  /  Alb  3.7  /  TBili  0.3  /  DBili  x   /  AST  16  /  ALT  15  /  AlkPhos  67  10-17    PT/INR - ( 17 Oct 2017 17:32 )   PT: 11.7 SEC;   INR: 1.04          PTT - ( 17 Oct 2017 17:32 )  PTT:24.7 SEC  CAPILLARY BLOOD GLUCOSE        CARDIAC MARKERS ( 17 Oct 2017 17:32 )  x     / < 0.06 ng/mL / x     / x     / x          Urinalysis Basic - ( 17 Oct 2017 23:09 )    Color: PLYEL / Appearance: CLEAR / S.012 / pH: 6.5  Gluc: NEGATIVE / Ketone: NEGATIVE  / Bili: NEGATIVE / Urobili: NORMAL E.U.   Blood: NEGATIVE / Protein: NEGATIVE / Nitrite: NEGATIVE   Leuk Esterase: TRACE / RBC: 0-2 / WBC 5-10   Sq Epi: OCC / Non Sq Epi: x / Bacteria: x        RADIOLOGY & ADDITIONAL TESTS:    Imaging Personally Reviewed:  [x] YES  [ ] NO    Consultant(s) Notes Reviewed:  [x] YES  [ ] NO      MEDICATIONS  (STANDING):  levothyroxine 88 MICROGram(s) Oral daily  pantoprazole  Injectable 40 milliGRAM(s) IV Push two times a day    MEDICATIONS  (PRN):      Care Discussed with Consultants/Other Providers [x] YES  [ ] NO    HEALTH ISSUES - PROBLEM Dx:  Other iron deficiency anemia: Other iron deficiency anemia  Melena: Melena  Renal cell cancer, unspecified laterality: Renal cell cancer, unspecified laterality  Prophylactic measure: Prophylactic measure  Essential (primary) hypertension: Essential (primary) hypertension  Hypothyroidism, unspecified type: Hypothyroidism, unspecified type  Anemia in neoplastic disease: Anemia in neoplastic disease

## 2017-10-19 NOTE — DISCHARGE NOTE ADULT - CARE PLAN
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 You're hemoglobin was 8.5 on discharge. An EGD was done in the hospital where they found non-bleeding gastric ulcer, a biopsy was taken. Please follow up with Dr. Burger for biposy results and to get a repeat EGD in 8 weeks to assure healing of gastric ulcers. Continue Protonix and iron pills as prescribed.  Please follow up with Dr. Escobar within 1 week for repeat CBC to monitor hemoglobin level.  Secondary Diagnosis:	Hypothyroidism, unspecified type  Instructions for follow-up, activity and diet:	continue synthroid.  Secondary Diagnosis:	Renal cell cancer, unspecified laterality  Instructions for follow-up, activity and diet:	Follow up with Dr. Escobar [phone].  Secondary Diagnosis:	Essential (primary) hypertension  Instructions for follow-up, activity and diet:	Continue Verapamil. Please follow up with Dr. Escobar within 1 week as your diastolic blood pressure was low.

## 2017-10-19 NOTE — PROGRESS NOTE ADULT - ASSESSMENT
82 y/o F with HTN, hypothyroidism, and RCC with lung mets p/w fatigue and dyspnea in the setting of anemia.

## 2018-12-06 PROBLEM — E03.9 HYPOTHYROIDISM, UNSPECIFIED: Chronic | Status: ACTIVE | Noted: 2017-10-17

## 2018-12-06 PROBLEM — Z00.00 ENCOUNTER FOR PREVENTIVE HEALTH EXAMINATION: Status: ACTIVE | Noted: 2018-12-06

## 2018-12-06 PROBLEM — I10 ESSENTIAL (PRIMARY) HYPERTENSION: Chronic | Status: ACTIVE | Noted: 2017-10-17

## 2018-12-13 ENCOUNTER — APPOINTMENT (OUTPATIENT)
Dept: NEUROLOGY | Facility: CLINIC | Age: 83
End: 2018-12-13
Payer: COMMERCIAL

## 2018-12-13 ENCOUNTER — INPATIENT (INPATIENT)
Facility: HOSPITAL | Age: 83
LOS: 6 days | Discharge: HOME | DRG: 25 | End: 2018-12-20
Attending: PSYCHIATRY & NEUROLOGY | Admitting: PSYCHIATRY & NEUROLOGY
Payer: MEDICARE

## 2018-12-13 VITALS
HEIGHT: 58 IN | TEMPERATURE: 98 F | DIASTOLIC BLOOD PRESSURE: 85 MMHG | OXYGEN SATURATION: 95 % | HEART RATE: 93 BPM | SYSTOLIC BLOOD PRESSURE: 154 MMHG | RESPIRATION RATE: 18 BRPM | WEIGHT: 145.06 LBS

## 2018-12-13 VITALS
DIASTOLIC BLOOD PRESSURE: 80 MMHG | RESPIRATION RATE: 18 BRPM | HEART RATE: 88 BPM | SYSTOLIC BLOOD PRESSURE: 156 MMHG | OXYGEN SATURATION: 95 %

## 2018-12-13 DIAGNOSIS — G93.9 DISORDER OF BRAIN, UNSPECIFIED: ICD-10-CM

## 2018-12-13 DIAGNOSIS — Z90.5 ACQUIRED ABSENCE OF KIDNEY: Chronic | ICD-10-CM

## 2018-12-13 LAB
ALBUMIN SERPL ELPH-MCNC: 3.9 G/DL — SIGNIFICANT CHANGE UP (ref 3.3–5)
ALP SERPL-CCNC: 93 U/L — SIGNIFICANT CHANGE UP (ref 40–120)
ALT FLD-CCNC: 22 U/L — SIGNIFICANT CHANGE UP (ref 10–45)
ANION GAP SERPL CALC-SCNC: 14 MMOL/L — SIGNIFICANT CHANGE UP (ref 5–17)
APTT BLD: 29 SEC — SIGNIFICANT CHANGE UP (ref 27.5–36.3)
AST SERPL-CCNC: 19 U/L — SIGNIFICANT CHANGE UP (ref 10–40)
BASOPHILS # BLD AUTO: 0.1 K/UL — SIGNIFICANT CHANGE UP (ref 0–0.2)
BASOPHILS NFR BLD AUTO: 0.8 % — SIGNIFICANT CHANGE UP (ref 0–2)
BILIRUB SERPL-MCNC: 0.3 MG/DL — SIGNIFICANT CHANGE UP (ref 0.2–1.2)
BUN SERPL-MCNC: 14 MG/DL — SIGNIFICANT CHANGE UP (ref 7–23)
CALCIUM SERPL-MCNC: 9 MG/DL — SIGNIFICANT CHANGE UP (ref 8.4–10.5)
CHLORIDE SERPL-SCNC: 104 MMOL/L — SIGNIFICANT CHANGE UP (ref 96–108)
CO2 SERPL-SCNC: 22 MMOL/L — SIGNIFICANT CHANGE UP (ref 22–31)
CREAT SERPL-MCNC: 0.81 MG/DL — SIGNIFICANT CHANGE UP (ref 0.5–1.3)
EOSINOPHIL # BLD AUTO: 0.2 K/UL — SIGNIFICANT CHANGE UP (ref 0–0.5)
EOSINOPHIL NFR BLD AUTO: 2.8 % — SIGNIFICANT CHANGE UP (ref 0–6)
GLUCOSE SERPL-MCNC: 128 MG/DL — HIGH (ref 70–99)
HCT VFR BLD CALC: 39.3 % — SIGNIFICANT CHANGE UP (ref 34.5–45)
HGB BLD-MCNC: 12.3 G/DL — SIGNIFICANT CHANGE UP (ref 11.5–15.5)
INR BLD: 1.06 RATIO — SIGNIFICANT CHANGE UP (ref 0.88–1.16)
LYMPHOCYTES # BLD AUTO: 1.6 K/UL — SIGNIFICANT CHANGE UP (ref 1–3.3)
LYMPHOCYTES # BLD AUTO: 20 % — SIGNIFICANT CHANGE UP (ref 13–44)
MCHC RBC-ENTMCNC: 23.8 PG — LOW (ref 27–34)
MCHC RBC-ENTMCNC: 31.4 GM/DL — LOW (ref 32–36)
MCV RBC AUTO: 75.7 FL — LOW (ref 80–100)
MONOCYTES # BLD AUTO: 0.9 K/UL — SIGNIFICANT CHANGE UP (ref 0–0.9)
MONOCYTES NFR BLD AUTO: 11.1 % — SIGNIFICANT CHANGE UP (ref 2–14)
NEUTROPHILS # BLD AUTO: 5.1 K/UL — SIGNIFICANT CHANGE UP (ref 1.8–7.4)
NEUTROPHILS NFR BLD AUTO: 65.3 % — SIGNIFICANT CHANGE UP (ref 43–77)
PLATELET # BLD AUTO: 267 K/UL — SIGNIFICANT CHANGE UP (ref 150–400)
POTASSIUM SERPL-MCNC: 4.3 MMOL/L — SIGNIFICANT CHANGE UP (ref 3.5–5.3)
POTASSIUM SERPL-SCNC: 4.3 MMOL/L — SIGNIFICANT CHANGE UP (ref 3.5–5.3)
PROT SERPL-MCNC: 7.2 G/DL — SIGNIFICANT CHANGE UP (ref 6–8.3)
PROTHROM AB SERPL-ACNC: 12.1 SEC — SIGNIFICANT CHANGE UP (ref 10–12.9)
RBC # BLD: 5.2 M/UL — SIGNIFICANT CHANGE UP (ref 3.8–5.2)
RBC # FLD: 15.7 % — HIGH (ref 10.3–14.5)
SODIUM SERPL-SCNC: 140 MMOL/L — SIGNIFICANT CHANGE UP (ref 135–145)
WBC # BLD: 7.8 K/UL — SIGNIFICANT CHANGE UP (ref 3.8–10.5)
WBC # FLD AUTO: 7.8 K/UL — SIGNIFICANT CHANGE UP (ref 3.8–10.5)

## 2018-12-13 PROCEDURE — 99244 OFF/OP CNSLTJ NEW/EST MOD 40: CPT

## 2018-12-13 PROCEDURE — 70450 CT HEAD/BRAIN W/O DYE: CPT | Mod: 26

## 2018-12-13 PROCEDURE — 99285 EMERGENCY DEPT VISIT HI MDM: CPT

## 2018-12-13 PROCEDURE — 99202 OFFICE O/P NEW SF 15 MIN: CPT

## 2018-12-13 RX ORDER — VERAPAMIL HCL 240 MG
120 CAPSULE, EXTENDED RELEASE PELLETS 24 HR ORAL DAILY
Qty: 0 | Refills: 0 | Status: DISCONTINUED | OUTPATIENT
Start: 2018-12-13 | End: 2018-12-18

## 2018-12-13 RX ORDER — LEVOTHYROXINE SODIUM 125 MCG
88 TABLET ORAL DAILY
Qty: 0 | Refills: 0 | Status: DISCONTINUED | OUTPATIENT
Start: 2018-12-13 | End: 2018-12-18

## 2018-12-13 RX ORDER — CEPHALEXIN 500 MG
1000 CAPSULE ORAL ONCE
Qty: 0 | Refills: 0 | Status: DISCONTINUED | OUTPATIENT
Start: 2018-12-13 | End: 2018-12-13

## 2018-12-13 RX ORDER — DEXAMETHASONE 0.5 MG/5ML
4 ELIXIR ORAL ONCE
Qty: 0 | Refills: 0 | Status: COMPLETED | OUTPATIENT
Start: 2018-12-13 | End: 2018-12-13

## 2018-12-13 RX ORDER — DEXAMETHASONE 0.5 MG/5ML
4 ELIXIR ORAL
Qty: 0 | Refills: 0 | Status: DISCONTINUED | OUTPATIENT
Start: 2018-12-13 | End: 2018-12-18

## 2018-12-13 RX ORDER — ENOXAPARIN SODIUM 100 MG/ML
40 INJECTION SUBCUTANEOUS EVERY 24 HOURS
Qty: 0 | Refills: 0 | Status: DISCONTINUED | OUTPATIENT
Start: 2018-12-13 | End: 2018-12-14

## 2018-12-13 RX ORDER — LEVETIRACETAM 250 MG/1
500 TABLET, FILM COATED ORAL
Qty: 0 | Refills: 0 | Status: DISCONTINUED | OUTPATIENT
Start: 2018-12-13 | End: 2018-12-18

## 2018-12-13 RX ORDER — VERAPAMIL HCL 240 MG
120 CAPSULE, EXTENDED RELEASE PELLETS 24 HR ORAL DAILY
Qty: 0 | Refills: 0 | Status: DISCONTINUED | OUTPATIENT
Start: 2018-12-13 | End: 2018-12-13

## 2018-12-13 RX ORDER — LEVETIRACETAM 250 MG/1
1000 TABLET, FILM COATED ORAL ONCE
Qty: 0 | Refills: 0 | Status: COMPLETED | OUTPATIENT
Start: 2018-12-13 | End: 2018-12-13

## 2018-12-13 RX ADMIN — LEVETIRACETAM 400 MILLIGRAM(S): 250 TABLET, FILM COATED ORAL at 22:57

## 2018-12-13 RX ADMIN — Medication 4 MILLIGRAM(S): at 22:57

## 2018-12-13 NOTE — H&P ADULT - ASSESSMENT
82 year old female presenting with a brain mass seen on outpatient MRI. Patient has PMH of untreated renal cell carcinoma with mets to lungs, hypothyroidism. Patient states she had kidney removed in 2007. Around Thanksgiving, she noticed some weakness in the right leg. PMD ordered brain MRI and referred to Dr. Helm. Since then, weakness has been stable. Today saw Dr. Helm as outpatient and recommended patient be admitted for further workup. Patient denies any numbness, tingling, nausea, vomiting, changes in vision. States she is able to walk but feels weaker in the right leg. Midline frontal dull headache earlier in the day now resolved.  On neuro exam, bilateral hip flexion weakness, right worse than left    L parietal brain mass    Plan:  Follow up official read of CTH  Keppra 500 mg PO BID  Decadron 4 mg PO BID  MRI brain with contrast  After MRI brain, and if repeat labs WNL, will obtain CT C/A/P  Neurosurgery consult with Dr. Thayer  Oncology consult 82 year old female presenting with a brain mass seen on outpatient MRI. Patient has PMH of untreated renal cell carcinoma with mets to lungs, hypothyroidism. Patient states she had kidney removed in 2007. Around Thanksgiving, she noticed some weakness in the right leg. PMD ordered brain MRI and referred to Dr. Helm. Since then, weakness has been stable. Today saw Dr. Helm as outpatient and recommended patient be admitted for further workup. Patient denies any numbness, tingling, nausea, vomiting, changes in vision. States she is able to walk but feels weaker in the right leg. Midline frontal dull headache earlier in the day now resolved.  On neuro exam, bilateral hip flexion weakness, right worse than left    L parietal brain mass    Plan:  Follow up official read of CTH  Keppra 500 mg PO BID  Decadron 4 mg PO BID  MRI brain with contrast  After MRI brain, and if repeat labs WNL, will obtain CT C/A/P  Neurosurgery consult with Dr. Thayer  Oncology consult  Discussed with Dr. Helm

## 2018-12-13 NOTE — H&P ADULT - NSHPPHYSICALEXAM_GEN_ALL_CORE
Neurological Exam:  Mental Status: Orientated to self, date and place.  Attention intact.  No dysarthria, aphasia or neglect.  Knowledge intact.  Registration intact.  Short and long term memory grossly intact.      Cranial Nerves: PERRL, EOMI, VFF, no nystagmus or diplopia.  CN V1-3 intact to light touch. No facial asymmetry.  Hearing intact.  Tongue midline.  Sternocleidomastoid and Trapezius intact bilaterally.    Motor:   Tone: normal            Strength:     Upper extremity                      Delt       Bicep    Tricep                                               R         5/5        5/5        5/5       5/5                                            L          5/5        5/5        5/5       5/5  Lower extremity                       HF          KE          KF        DF         PF                                            R        4/5        5/5        5/5       5/5       5/5                                            L         4+/5        5/5       5/5       5/5        5/5  Pronator drift: none                 Dysmetria: None to finger-nose-finger  No truncal ataxia.    Tremor: No resting, postural or action tremor.  No myoclonus.    Sensation: intact to light touch    Deep Tendon Reflexes: 0 bilateral biceps, triceps, brachioradialis, knee and ankle  Toes mute bilaterally

## 2018-12-13 NOTE — ED PROVIDER NOTE - CARE PLAN
Principal Discharge DX:	Brain mass Principal Discharge DX:	Brain mass  Secondary Diagnosis:	Nontraumatic cortical hemorrhage of left cerebral hemisphere

## 2018-12-13 NOTE — ED PROVIDER NOTE - ATTENDING CONTRIBUTION TO CARE
MD Brown:  patient seen and evaluated with the resident.  I was present for key portions of the History & Physical, and I agree with the Impression & Plan.  MD Brown:  81 yo

## 2018-12-13 NOTE — H&P ADULT - NSHPLABSRESULTS_GEN_ALL_CORE
12.3   7.8   )-----------( 267      ( 13 Dec 2018 21:55 )             39.3     12-13    140  |  104  |  14  ----------------------------<  128<H>  4.3   |  22  |  0.81    Ca    9.0      13 Dec 2018 21:55    TPro  7.2  /  Alb  3.9  /  TBili  0.3  /  DBili  x   /  AST  19  /  ALT  22  /  AlkPhos  93  12-13    PT/INR - ( 13 Dec 2018 21:55 )   PT: 12.1 sec;   INR: 1.06 ratio       PTT - ( 13 Dec 2018 21:55 )  PTT:29.0 sec    Vital Signs Last 24 Hrs  T(C): 36.9 (13 Dec 2018 20:33), Max: 36.9 (13 Dec 2018 20:33)  T(F): 98.4 (13 Dec 2018 20:33), Max: 98.4 (13 Dec 2018 20:33)  HR: 93 (13 Dec 2018 20:33) (93 - 93)  BP: 154/85 (13 Dec 2018 20:33) (154/85 - 154/85)  RR: 18 (13 Dec 2018 20:33) (18 - 18)  SpO2: 95% (13 Dec 2018 20:33) (95% - 95%)

## 2018-12-13 NOTE — H&P ADULT - HISTORY OF PRESENT ILLNESS
Patient is a 82 year old female presenting with a brain mass seen on outpatient MRI. Patient has PMH of untreated renal cell carcinoma with mets to lungs, hypothyroidism. Patient states she had kidney removed in 2007. Around Thanksgiving, she noticed some weakness in the right leg. PMD ordered brain MRI and referred to Dr. Helm. Since then, weakness has been stable. Today saw Dr. Helm as outpatient and recommended patient be admitted for further workup. Patient denies any numbness, tingling, nausea, vomiting, changes in vision. States she is able to walk but feels weaker in the right leg. Midline frontal dull headache earlier in the day now resolved.

## 2018-12-13 NOTE — ED ADULT NURSE NOTE - NSIMPLEMENTINTERV_GEN_ALL_ED
Implemented All Universal Safety Interventions:  [place] to call system. Call bell, personal items and telephone within reach. Instruct patient to call for assistance. Room bathroom lighting operational. Non-slip footwear when patient is off stretcher. Physically safe environment: no spills, clutter or unnecessary equipment. Stretcher in lowest position, wheels locked, appropriate side rails in place.

## 2018-12-13 NOTE — ED PROVIDER NOTE - OBJECTIVE STATEMENT
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 Pt states she received the MRI 11/28 in response to headache/dizziness at that time; these symptoms passed on their own without steroids or other medications, but now her headache is returning. She has mild R sided weakness and numbness that affects her steadiness but currently she is still ambulatory. No dizziness or lightheadedness at this time, no n/v/d, no blurry vision. Pt states headache is in center of head.

## 2018-12-13 NOTE — H&P ADULT - ATTENDING COMMENTS
I performed a history and physical examination of the patient and discussed the management of the patient with the resident. I reviewed the resident's note and agree with the documented findings and plan of care with the following additions/exceptions.    DOS 12/14/18    She feels a little better with right foot weakness s/p steroids. Denies headache.     on exam I didn't detect left leg weakness that was mentioned above. right foot only minor weakness.     she was alert, fluent, gives her fully history. attentive, appropriate social graces, euthymic and reactive. there as no apraxia to 2/2 transitive and 2/2 intransitive gestures. there was no agraphia though her sentence was more of a command "back to school now". she had right/left confusion only on examiner. 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   with dyscalculia testing she understood symbols, she could write numbers to dictation, she got 3/4 correct with magnitude testing- missed the one with multple zeros. trouble reading numbers when multiple zeros in the number- (transcoding issue). She could subtract simple numbers better when she read the problem written for her aloud. She couldn't subtract when it required borrowing.  she could calculate quart in $1.75. 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 She was able to draw a clock, anchored numbers first, knew the hands that fo time 11:10 but they didn't nicely emanate from center properly.      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  needs oncology consult, ct c/a/p. neurosurgery planning for procedure monday so will consult for clearance. mri spine given questionable LMD on brain. I performed a history and physical examination of the patient and discussed the management of the patient with the resident. I reviewed the resident's note and agree with the documented findings and plan of care with the following additions/exceptions.    DOS 12/14/18    She feels a little better with right foot weakness s/p steroids. Denies headache.     on exam I didn't detect left leg weakness that was mentioned above. right foot only minor weakness.     she was alert, fluent, gives her fully history. attentive, appropriate social graces, euthymic and reactive. there as no apraxia to 2/2 transitive and 2/2 intransitive gestures. there was no agraphia though her sentence was more of a command "back to school now". she had right/left confusion only on examiner. 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   with dyscalculia testing she understood symbols, she could write numbers to dictation, she got 3/4 correct with magnitude testing- missed the one with multple zeros. trouble reading numbers when multiple zeros in the number- (transcoding issue). She could subtract simple numbers better when she read the problem written for her aloud. She couldn't subtract when it required borrowing.  she could calculate quart in $1.75. 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 She was able to draw a clock, anchored numbers first, knew the hands that fo time 11:10 but they didn't nicely emanate from center properly.      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  needs oncology consult, ct c/a/p. neurosurgery planning for procedure monday so will consult for clearance. mri spine given questionable LMD on brain.

## 2018-12-13 NOTE — ED ADULT NURSE NOTE - OBJECTIVE STATEMENT
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

## 2018-12-13 NOTE — ED PROVIDER NOTE - MEDICAL DECISION MAKING DETAILS
Pt w RCC w/ mets, known brain mass now w/ new headache followed by neuro-onc Alicja, will obtain CT to assess for expansion/rebleed, neuro consult, cont to reassess

## 2018-12-13 NOTE — ED ADULT TRIAGE NOTE - CHIEF COMPLAINT QUOTE
HA/diff ambulating since yesterday, pt had recent similar symptoms at end of November with MRI that showed mass vs possible resolution of hemorrhage. denies numbness/tingling/n/v/weakness

## 2018-12-14 LAB
ALBUMIN SERPL ELPH-MCNC: 3.5 G/DL — SIGNIFICANT CHANGE UP (ref 3.3–5)
ALP SERPL-CCNC: 82 U/L — SIGNIFICANT CHANGE UP (ref 40–120)
ALT FLD-CCNC: 19 U/L — SIGNIFICANT CHANGE UP (ref 10–45)
ANION GAP SERPL CALC-SCNC: 14 MMOL/L — SIGNIFICANT CHANGE UP (ref 5–17)
APPEARANCE UR: CLEAR — SIGNIFICANT CHANGE UP
AST SERPL-CCNC: 15 U/L — SIGNIFICANT CHANGE UP (ref 10–40)
BACTERIA # UR AUTO: NEGATIVE — SIGNIFICANT CHANGE UP
BASOPHILS # BLD AUTO: 0.01 K/UL — SIGNIFICANT CHANGE UP (ref 0–0.2)
BASOPHILS NFR BLD AUTO: 0.3 % — SIGNIFICANT CHANGE UP (ref 0–2)
BILIRUB SERPL-MCNC: 0.3 MG/DL — SIGNIFICANT CHANGE UP (ref 0.2–1.2)
BILIRUB UR-MCNC: NEGATIVE — SIGNIFICANT CHANGE UP
BLD GP AB SCN SERPL QL: NEGATIVE — SIGNIFICANT CHANGE UP
BUN SERPL-MCNC: 13 MG/DL — SIGNIFICANT CHANGE UP (ref 7–23)
CALCIUM SERPL-MCNC: 8.7 MG/DL — SIGNIFICANT CHANGE UP (ref 8.4–10.5)
CHLORIDE SERPL-SCNC: 105 MMOL/L — SIGNIFICANT CHANGE UP (ref 96–108)
CO2 SERPL-SCNC: 20 MMOL/L — LOW (ref 22–31)
COLOR SPEC: SIGNIFICANT CHANGE UP
CREAT SERPL-MCNC: 0.71 MG/DL — SIGNIFICANT CHANGE UP (ref 0.5–1.3)
DIFF PNL FLD: NEGATIVE — SIGNIFICANT CHANGE UP
EOSINOPHIL # BLD AUTO: 0.02 K/UL — SIGNIFICANT CHANGE UP (ref 0–0.5)
EOSINOPHIL NFR BLD AUTO: 0.5 % — SIGNIFICANT CHANGE UP (ref 0–6)
EPI CELLS # UR: 3 /HPF — SIGNIFICANT CHANGE UP
GLUCOSE SERPL-MCNC: 144 MG/DL — HIGH (ref 70–99)
GLUCOSE UR QL: NEGATIVE — SIGNIFICANT CHANGE UP
HCT VFR BLD CALC: 36.5 % — SIGNIFICANT CHANGE UP (ref 34.5–45)
HGB BLD-MCNC: 11 G/DL — LOW (ref 11.5–15.5)
HYALINE CASTS # UR AUTO: 1 /LPF — SIGNIFICANT CHANGE UP (ref 0–2)
IMM GRANULOCYTES NFR BLD AUTO: 0.3 % — SIGNIFICANT CHANGE UP (ref 0–1.5)
KETONES UR-MCNC: NEGATIVE — SIGNIFICANT CHANGE UP
LEUKOCYTE ESTERASE UR-ACNC: ABNORMAL
LYMPHOCYTES # BLD AUTO: 0.52 K/UL — LOW (ref 1–3.3)
LYMPHOCYTES # BLD AUTO: 13.4 % — SIGNIFICANT CHANGE UP (ref 13–44)
MCHC RBC-ENTMCNC: 23.2 PG — LOW (ref 27–34)
MCHC RBC-ENTMCNC: 30.1 GM/DL — LOW (ref 32–36)
MCV RBC AUTO: 76.8 FL — LOW (ref 80–100)
MONOCYTES # BLD AUTO: 0.09 K/UL — SIGNIFICANT CHANGE UP (ref 0–0.9)
MONOCYTES NFR BLD AUTO: 2.3 % — SIGNIFICANT CHANGE UP (ref 2–14)
NEUTROPHILS # BLD AUTO: 3.24 K/UL — SIGNIFICANT CHANGE UP (ref 1.8–7.4)
NEUTROPHILS NFR BLD AUTO: 83.2 % — HIGH (ref 43–77)
NITRITE UR-MCNC: NEGATIVE — SIGNIFICANT CHANGE UP
PH UR: 6 — SIGNIFICANT CHANGE UP (ref 5–8)
PLATELET # BLD AUTO: 235 K/UL — SIGNIFICANT CHANGE UP (ref 150–400)
POTASSIUM SERPL-MCNC: 4.5 MMOL/L — SIGNIFICANT CHANGE UP (ref 3.5–5.3)
POTASSIUM SERPL-SCNC: 4.5 MMOL/L — SIGNIFICANT CHANGE UP (ref 3.5–5.3)
PROT SERPL-MCNC: 6.5 G/DL — SIGNIFICANT CHANGE UP (ref 6–8.3)
PROT UR-MCNC: NEGATIVE — SIGNIFICANT CHANGE UP
RBC # BLD: 4.75 M/UL — SIGNIFICANT CHANGE UP (ref 3.8–5.2)
RBC # FLD: 16.5 % — HIGH (ref 10.3–14.5)
RBC CASTS # UR COMP ASSIST: 2 /HPF — SIGNIFICANT CHANGE UP (ref 0–4)
RH IG SCN BLD-IMP: POSITIVE — SIGNIFICANT CHANGE UP
SODIUM SERPL-SCNC: 139 MMOL/L — SIGNIFICANT CHANGE UP (ref 135–145)
SP GR SPEC: 1.01 — SIGNIFICANT CHANGE UP (ref 1.01–1.02)
UROBILINOGEN FLD QL: NEGATIVE — SIGNIFICANT CHANGE UP
WBC # BLD: 3.89 K/UL — SIGNIFICANT CHANGE UP (ref 3.8–10.5)
WBC # FLD AUTO: 3.89 K/UL — SIGNIFICANT CHANGE UP (ref 3.8–10.5)
WBC UR QL: 8 /HPF — HIGH (ref 0–5)

## 2018-12-14 PROCEDURE — 99223 1ST HOSP IP/OBS HIGH 75: CPT

## 2018-12-14 PROCEDURE — 99223 1ST HOSP IP/OBS HIGH 75: CPT | Mod: GC

## 2018-12-14 PROCEDURE — 93010 ELECTROCARDIOGRAM REPORT: CPT

## 2018-12-14 PROCEDURE — 70553 MRI BRAIN STEM W/O & W/DYE: CPT | Mod: 26

## 2018-12-14 PROCEDURE — 71260 CT THORAX DX C+: CPT | Mod: 26

## 2018-12-14 PROCEDURE — 74177 CT ABD & PELVIS W/CONTRAST: CPT | Mod: 26

## 2018-12-14 RX ORDER — LEVOTHYROXINE SODIUM 0.09 MG/1
88 TABLET ORAL DAILY
Qty: 30 | Refills: 0 | Status: ACTIVE | COMMUNITY
Start: 2018-12-14

## 2018-12-14 RX ORDER — PANTOPRAZOLE SODIUM 20 MG/1
40 TABLET, DELAYED RELEASE ORAL DAILY
Qty: 0 | Refills: 0 | Status: DISCONTINUED | OUTPATIENT
Start: 2018-12-14 | End: 2018-12-18

## 2018-12-14 RX ORDER — VERAPAMIL HYDROCHLORIDE 120 MG/1
120 TABLET ORAL DAILY
Qty: 30 | Refills: 0 | Status: ACTIVE | COMMUNITY
Start: 2018-12-14

## 2018-12-14 RX ORDER — ENOXAPARIN SODIUM 100 MG/ML
40 INJECTION SUBCUTANEOUS EVERY 24 HOURS
Qty: 0 | Refills: 0 | Status: DISCONTINUED | OUTPATIENT
Start: 2018-12-14 | End: 2018-12-16

## 2018-12-14 RX ADMIN — Medication 4 MILLIGRAM(S): at 05:14

## 2018-12-14 RX ADMIN — PANTOPRAZOLE SODIUM 40 MILLIGRAM(S): 20 TABLET, DELAYED RELEASE ORAL at 17:40

## 2018-12-14 RX ADMIN — LEVETIRACETAM 500 MILLIGRAM(S): 250 TABLET, FILM COATED ORAL at 17:36

## 2018-12-14 RX ADMIN — LEVETIRACETAM 500 MILLIGRAM(S): 250 TABLET, FILM COATED ORAL at 05:14

## 2018-12-14 RX ADMIN — Medication 120 MILLIGRAM(S): at 05:36

## 2018-12-14 RX ADMIN — ENOXAPARIN SODIUM 40 MILLIGRAM(S): 100 INJECTION SUBCUTANEOUS at 05:14

## 2018-12-14 RX ADMIN — Medication 4 MILLIGRAM(S): at 17:36

## 2018-12-14 RX ADMIN — Medication 88 MICROGRAM(S): at 05:14

## 2018-12-14 RX ADMIN — ENOXAPARIN SODIUM 40 MILLIGRAM(S): 100 INJECTION SUBCUTANEOUS at 17:36

## 2018-12-14 NOTE — MEDICAL STUDENT PROGRESS NOTE(EDUCATION) - SUBJECTIVE AND OBJECTIVE BOX
82F with history of renal cell carncioma s/p nephrectomy in 2007 with mets to lungs, hypothyroidism presenting following 1 episode of dizziness and 2 weeks of progressive weakness in the right leg.     Subjective: No acute events overnight. Patient endorses weakness in her right leg, difficulty ambulating. Denies sensory changes, headache, dizziness. Patient states that since Thanksgiving she has had trouble with complex tasks, calculations eg. balancing her checkbook.     Physical Exam:   Vitals: T-36.4, HR-79, BP-124/73, RR-16, SaO2-94  General: Well appearing. No apparent distress.   Resp: Non-labored breathing   Neurological Exam:  	Mental Status: Orientated to self, date and place. No dysarthria, aphasia or neglect.  Knowledge intact.  Registration intact.  Short and long term memory grossly intact. Deficits in attention testing, unable to do serial 7s and spell "world" backwards.   	Cranial Nerves: PERRL, EOMI, VFF, no nystagmus or diplopia.  CN V1-3 intact to light touch. No facial asymmetry.  Hearing intact.  Tongue midline.  Sternocleidomastoid and Trapezius intact bilaterally.  	Motor:   	Tone: normal            	Strength:     	Upper extremity                      Delt       Bicep    Tricep     	                                          R         5/5        5/5        5/5       5/5  	                                          L          5/5        5/5        5/5       5/5  	Lower extremity                       HF          KE          KF        DF         PF  	                                          R        4/5        5/5        5/5       5/5       5/5  	                                          L         4+/5        5/5       5/5       5/5        5/5  	Pronator drift: none                 	Dysmetria: None to finger-nose-finger  	No truncal ataxia.    	Tremor: No resting, postural or action tremor.  No myoclonus.  	Sensation: intact to light touch  	Deep Tendon Reflexes: Left patellar 2+.     Labs:   CMP: CO2-20, Glucose-144   UA- 8 WBCs, large leukocyte esterase, negative bacteria, negative nitrites

## 2018-12-14 NOTE — CONSULT NOTE ADULT - SUBJECTIVE AND OBJECTIVE BOX
p ([id number])     HPI:  Patient is a 82 year old female presenting with a brain mass seen on outpatient MRI. Patient has PMH of untreated renal cell carcinoma with mets to lungs, hypothyroidism. Patient states she had kidney removed in 2007. Around Thanksgiving, she noticed some weakness in the right leg. PMD ordered brain MRI and referred to Dr. Helm. Since then, weakness has been stable. Today saw Dr. Helm as outpatient and recommended patient be admitted for further workup. Patient denies any numbness, tingling, nausea, vomiting, changes in vision.     Imaging:  Large L motor strip cystic mass consistent with probably renal cell carcinoma met    Exam:  AOx3, FC, PERRL, EOMI, no facial   4+/5 R HF but otherwise 5/5 throughout  SILT  no clonus      --Anticoagulation:  enoxaparin Injectable 40 milliGRAM(s) SubCutaneous every 24 hours    =====================  PAST MEDICAL HISTORY   Essential (primary) hypertension  Hypothyroidism, unspecified type  Renal cancer    PAST SURGICAL HISTORY   History of nephrectomy        MEDICATIONS:  Antibiotics:    Neuro:  levETIRAcetam 500 milliGRAM(s) Oral two times a day    Other:  dexamethasone     Tablet 4 milliGRAM(s) Oral two times a day  levothyroxine 88 MICROGram(s) Oral daily  verapamil  milliGRAM(s) Oral daily      SOCIAL HISTORY:   Occupation:   Marital Status:     FAMILY HISTORY:  No pertinent family history in first degree relatives      ROS: Negative except per HPI    LABS:  PT/INR - ( 13 Dec 2018 21:55 )   PT: 12.1 sec;   INR: 1.06 ratio         PTT - ( 13 Dec 2018 21:55 )  PTT:29.0 sec                        12.3   7.8   )-----------( 267      ( 13 Dec 2018 21:55 )             39.3     12-14    139  |  105  |  13  ----------------------------<  144<H>  4.5   |  20<L>  |  0.71    Ca    8.7      14 Dec 2018 06:09    TPro  6.5  /  Alb  3.5  /  TBili  0.3  /  DBili  x   /  AST  15  /  ALT  19  /  AlkPhos  82  12-14

## 2018-12-14 NOTE — CONSULT NOTE ADULT - SUBJECTIVE AND OBJECTIVE BOX
Patient is a 82y old  Female who presents with a chief complaint of Brain mets (14 Dec 2018 10:20)      HPI:  Patient is a 82 year old female presenting with a brain mass seen on outpatient MRI. Patient has PMH of untreated renal cell carcinoma with mets to lungs, hypothyroidism. Patient states she had kidney removed in . Around , she noticed some weakness in the right leg. PMD ordered brain MRI and referred to Dr. Helm. Since then, weakness has been stable. Today saw Dr. Helm as outpatient and recommended patient be admitted for further workup. Patient denies any numbness, tingling, nausea, vomiting, changes in vision. States she is able to walk but feels weaker in the right leg. Midline frontal dull headache earlier in the day now resolved. (13 Dec 2018 23:11)      PAST MEDICAL & SURGICAL HISTORY:  Essential (primary) hypertension  Hypothyroidism, unspecified type  Renal cancer  History of nephrectomy: Left nephrectomy       FAMILY HISTORY:  No pertinent family history in first degree relatives      SOCIAL HISTORY:    Allergies    No Known Allergies    Intolerances        REVIEW OF SYSTEMS    General:	Denies fatigue, fevers, chills, sweats, decreased appetite.    Skin/Breast: denies pruritis, rash  	  Ophthalmologic: no change in vision or blurring  	  HEENT	Denies dry mouth, oral sores, dysphagia,  change in hearing.    Respiratory and Thorax:  cough, sob, wheeze, hemoptysis  	  Cardiovascular:	no cp , palp, orthopnea    Gastrointestinal:	no n/v/d constipation    Genitourinary:	no dysuria of frequency, no hematuria, no flank pain    Musculoskeletal:	no bone or joint pain. no muscle aches.     Neurological:	no change in sensory or motor function. no headache. no weakness.     Psychiatric:	no depression, no anxiety, insomnia.     Hematology/Lymphatics:	no bleeding or bruising    SUBJECTIVE / OVERNIGHT EVENTS:        Vital Signs Last 24 Hrs  T(C): 36.4 (14 Dec 2018 09:17), Max: 36.9 (13 Dec 2018 20:33)  T(F): 97.5 (14 Dec 2018 09:17), Max: 98.4 (13 Dec 2018 20:33)  HR: 79 (14 Dec 2018 09:17) (79 - 93)  BP: 124/73 (14 Dec 2018 09:17) (124/63 - 154/85)  BP(mean): --  RR: 16 (14 Dec 2018 09:17) (16 - 18)  SpO2: 94% (14 Dec 2018 09:17) (94% - 98%)  I&O's Summary    13 Dec 2018 07:01  -  14 Dec 2018 07:00  --------------------------------------------------------  IN: 120 mL / OUT: 0 mL / NET: 120 mL        PHYSICAL EXAM:  GENERAL: NAD, Comfortable  HEAD:  Atraumatic, Normocephalic  EYES: EOMI, PERRLA, conjunctiva and sclera clear  NECK: Supple, No JVD  CHEST/LUNG: Clear to auscultation bilaterally; No wheeze  HEART: Regular rate and rhythm; No murmurs, rubs, or gallops  ABDOMEN: Soft, Nontender, Nondistended; Bowel sounds present  Neuro: AAOx3, no focal deficit, 5/5 b/l extremities  EXTREMITIES:  2+ Peripheral Pulses, No clubbing, cyanosis, or edema  SKIN: No rashes or lesions    LABS:                        12.3   7.8   )-----------( 267      ( 13 Dec 2018 21:55 )             39.3     12-14    139  |  105  |  13  ----------------------------<  144<H>  4.5   |  20<L>  |  0.71    Ca    8.7      14 Dec 2018 06:09    TPro  6.5  /  Alb  3.5  /  TBili  0.3  /  DBili  x   /  AST  15  /  ALT  19  /  AlkPhos  82  12-14    PT/INR - ( 13 Dec 2018 21:55 )   PT: 12.1 sec;   INR: 1.06 ratio         PTT - ( 13 Dec 2018 21:55 )  PTT:29.0 sec  CAPILLARY BLOOD GLUCOSE            Urinalysis Basic - ( 14 Dec 2018 01:29 )    Color: Light Yellow / Appearance: Clear / S.010 / pH: x  Gluc: x / Ketone: Negative  / Bili: Negative / Urobili: Negative   Blood: x / Protein: Negative / Nitrite: Negative   Leuk Esterase: Large / RBC: 2 /hpf / WBC 8 /hpf   Sq Epi: x / Non Sq Epi: 3 /hpf / Bacteria: Negative        RADIOLOGY & ADDITIONAL TESTS:    Imaging Personally Reviewed:  [x] YES  [ ] NO    Consultant(s) Notes Reviewed:  [x] YES  [ ] NO      MEDICATIONS  (STANDING):  dexamethasone     Tablet 4 milliGRAM(s) Oral two times a day  enoxaparin Injectable 40 milliGRAM(s) SubCutaneous every 24 hours  levETIRAcetam 500 milliGRAM(s) Oral two times a day  levothyroxine 88 MICROGram(s) Oral daily  verapamil  milliGRAM(s) Oral daily    MEDICATIONS  (PRN):      Care Discussed with Consultants/Other Providers [x] YES  [ ] NO    HEALTH ISSUES - PROBLEM Dx: Patient is a 82y old  Female who presents with a chief complaint of Brain mets (14 Dec 2018 10:20)      HPI:  Patient is a 82 year old female presenting with a brain mass seen on outpatient MRI. Patient has PMH of untreated renal cell carcinoma with mets to lungs, hypothyroidism. Patient states she had kidney removed in . Around , she noticed some weakness in the right leg. PMD ordered brain MRI and referred to Dr. Helm. Since then, weakness has been stable. Today saw Dr. Helm as outpatient and recommended patient be admitted for further workup. Patient denies any numbness, tingling, nausea, vomiting, changes in vision. States she is able to walk but feels weaker in the right leg. Midline frontal dull headache earlier in the day now resolved. (13 Dec 2018 23:11) - from H&P      PAST MEDICAL & SURGICAL HISTORY:  Essential (primary) hypertension  Hypothyroidism, unspecified type  Renal cancer  History of nephrectomy: Left nephrectomy       FAMILY HISTORY:  No pertinent family history in first degree relatives      SOCIAL HISTORY:    Allergies    No Known Allergies    Intolerances        REVIEW OF SYSTEMS    General:	Denies fatigue, fevers, chills, sweats, decreased appetite.    Skin/Breast: denies pruritis, rash  	  Ophthalmologic: no change in vision or blurring  	  HEENT	Denies dry mouth, oral sores, dysphagia,  change in hearing.    Respiratory and Thorax:  cough, sob, wheeze, hemoptysis  	  Cardiovascular:	no cp , palp, orthopnea    Gastrointestinal:	no n/v/d constipation    Genitourinary:	no dysuria of frequency, no hematuria, no flank pain    Musculoskeletal:	no bone or joint pain. no muscle aches.     Neurological:	no change in sensory or motor function. no headache. no weakness.     Psychiatric:	no depression, no anxiety, insomnia.     Hematology/Lymphatics:	no bleeding or bruising      SUBJECTIVE / OVERNIGHT EVENTS:    pt co dizziness starting around . MRI outpt showed cyst. denies cp sob nvd. states she doesn't walk much but drives to stores and restaurants. does not use walker at home.  currently denies ha dizziness.      Vital Signs Last 24 Hrs  T(C): 36.4 (14 Dec 2018 09:17), Max: 36.9 (13 Dec 2018 20:33)  T(F): 97.5 (14 Dec 2018 09:17), Max: 98.4 (13 Dec 2018 20:33)  HR: 79 (14 Dec 2018 09:17) (79 - 93)  BP: 124/73 (14 Dec 2018 09:17) (124/63 - 154/85)  BP(mean): --  RR: 16 (14 Dec 2018 09:17) (16 - 18)  SpO2: 94% (14 Dec 2018 09:17) (94% - 98%)  I&O's Summary    13 Dec 2018 07:01  -  14 Dec 2018 07:00  --------------------------------------------------------  IN: 120 mL / OUT: 0 mL / NET: 120 mL        PHYSICAL EXAM:  GENERAL: NAD, Comfortable  HEAD:  Atraumatic, Normocephalic  EYES: EOMI, PERRLA, conjunctiva and sclera clear  NECK: Supple, No JVD  CHEST/LUNG: Clear to auscultation bilaterally; No wheeze  HEART: Regular rate and rhythm; No murmurs, rubs, or gallops  ABDOMEN: Soft, Nontender, Nondistended; Bowel sounds present  Neuro: AAOx3, no focal deficit, 5/5 b/l extremities  EXTREMITIES:  2+ Peripheral Pulses, No clubbing, cyanosis, or edema  SKIN: No rashes or lesions    LABS:                        12.3   7.8   )-----------( 267      ( 13 Dec 2018 21:55 )             39.3     12-14    139  |  105  |  13  ----------------------------<  144<H>  4.5   |  20<L>  |  0.71    Ca    8.7      14 Dec 2018 06:09    TPro  6.5  /  Alb  3.5  /  TBili  0.3  /  DBili  x   /  AST  15  /  ALT  19  /  AlkPhos  82  12-14    PT/INR - ( 13 Dec 2018 21:55 )   PT: 12.1 sec;   INR: 1.06 ratio         PTT - ( 13 Dec 2018 21:55 )  PTT:29.0 sec  CAPILLARY BLOOD GLUCOSE            Urinalysis Basic - ( 14 Dec 2018 01:29 )    Color: Light Yellow / Appearance: Clear / S.010 / pH: x  Gluc: x / Ketone: Negative  / Bili: Negative / Urobili: Negative   Blood: x / Protein: Negative / Nitrite: Negative   Leuk Esterase: Large / RBC: 2 /hpf / WBC 8 /hpf   Sq Epi: x / Non Sq Epi: 3 /hpf / Bacteria: Negative        RADIOLOGY & ADDITIONAL TESTS:    Imaging Personally Reviewed:  [x] YES  [ ] NO    Consultant(s) Notes Reviewed:  [x] YES  [ ] NO      MEDICATIONS  (STANDING):  dexamethasone     Tablet 4 milliGRAM(s) Oral two times a day  enoxaparin Injectable 40 milliGRAM(s) SubCutaneous every 24 hours  levETIRAcetam 500 milliGRAM(s) Oral two times a day  levothyroxine 88 MICROGram(s) Oral daily  verapamil  milliGRAM(s) Oral daily    MEDICATIONS  (PRN):      Care Discussed with Consultants/Other Providers [x] YES  [ ] NO    HEALTH ISSUES - PROBLEM Dx:

## 2018-12-14 NOTE — PROGRESS NOTE ADULT - ASSESSMENT
Ms. Felix is an 82 year old female presenting with a brain mass seen on outpatient MRI. Patient has PMH of untreated renal cell carcinoma with mets to lungs, hypothyroidism.     Impression:     L parietal brain lesion concern for metastatic lesion. History of Renal cell carcinoma    Plan:  [] Keppra 500 mg PO BID  [] Decadron 4 mg PO BID  [] MRI brain with contrast  [] After MRI brain, and if repeat labs WNL, will obtain CT C/A/P  [] Neurosurgery consult with Dr. Thayer  [] Oncology consult  [] Discussed with Dr. Helm Ms. Felix is an 82 year old female presenting with a brain mass seen on outpatient MRI. Patient has PMH of untreated renal cell carcinoma with mets to lungs, hypothyroidism. MRI done on 12/14 shows left parietal 5 x 4 x 4.3 cm AP, TR, CC mixed density mass with hemorrhagic fluid level and heterogeneous enhancement concerning for neoplasm either primary or metastatic,    Impression:     L parietal brain lesion concern for metastatic lesion. History of Renal cell carcinoma. Unable to obtain outpatient Onc records however did see Dr. Cornelio Taylor once and followed with PMD thereafter for monitoring.     Plan:  [] Keppra 500 mg PO BID  [] Decadron 4 mg PO BID  [] MRI brain with contrast completed   [] After MRI brain, and if repeat labs WNL, will obtain CT C/A/P  [] Neurosurgery consulted, appreciate recommendations. For Omaya placement on Monday, will hold DVT prophylaxis on Sunday   [] Medicine consulted for surgical clearance, appreciate recommendations. EKG to be done.   [] Oncology consult    - Plan discussed with Attending Dr. Molina

## 2018-12-14 NOTE — CONSULT NOTE ADULT - ATTENDING COMMENTS
Patient seen and examined, agree with above.    MRI: single cystic metastatic lesion with deep/anterior mural nodule. Plan as described is to drain the cyst, which will relieve the patient's symptoms, attach a subcutaneous reservoir that could be easily accessed should the cyst reaccumulate, and treat the small nodule with stereotactic radiosurgery. In the meantime the patient could start medical treatment with a TKI or immune checkpoint inhibitor as per Medical Oncology.

## 2018-12-14 NOTE — MEDICAL STUDENT PROGRESS NOTE(EDUCATION) - NS MD HP STUD ASPLAN PLAN FT
1. L parietal mass  -F/U MRI   -Keppra 500 mg PO BID   -Decadron 4 mg PO BID   -Will obtain CT C/A/P   -Neurosurgery was consulted, F/U recs   -Oncology consult   -Discussed with Dr. Helm 1. L parietal mass  -F/U MRI   -Keppra 500 mg PO BID   -Decadron 4 mg PO BID   -Will obtain CT C/A/P   -Neurosurgery was consulted, F/U recs   -Oncology consulted   -Discussed with Dr. Helm

## 2018-12-14 NOTE — CONSULT NOTE ADULT - ASSESSMENT
82 year old female with hx RCC s/p nephrectomy in 2007, known lung metastasis (not on any treatment) who lost follow up with  oncology, recently started experiencing right leg weakness, found to have hemorrhagic brain mass. MRI showed left parietal 5 x 4 x 4.3cm density mass with hemorrhagic fluid level and heterogeneous   enhancement concerning for neoplasm either primary or metastatic with rightward shift.    Plan:  - Recommend: CT c/a/p with IV contrast   - Obtain collateral records from [facility]  - Neurosurgery following: they are planning for OR Monday for placement of omaya and fenestration of cyst and followup SRS  - Will need a tissue proven diagnosis prior to discussing treatment options     Coral Jackelin  PGY-4 Oncology  [phone]

## 2018-12-14 NOTE — CONSULT NOTE ADULT - ASSESSMENT
82 year old female PMHx hypothyroidism, HTN, and RCC s/p L nephrectomy in 2007 with recurrence and lung mets, presenting with a brain mass seen on outpatient MRI.     # Brain mass    # RCC    # hypothyroidism    # HTN 82 year old female PMHx hypothyroidism, HTN (pt denies having), and RCC s/p L nephrectomy in 2007 with recurrence and lung mets, presenting with a brain mass seen on outpatient MRI. medicine consult for pre op evaluation.    # Brain mass  MRI brain shows left parietal mixed density mass with hemorrhagic fluid and heterogeneous enhancement concerning for neoplasm either primary or metastatic, with 4 mm rightward shift. left parietal extra-axial paramedian enhancing foci with calcification possibly calcified meningioma or metastasis.  appreciate neurosurgery and neurology recs  cont keppra and decadron for seizure ppx  medicine pre op eval:  no active cardiac conditions, no clinical risk factors, METS 4, check EKG  intermediate risk procedure  medically optimized for OR    # RCC  sp nephrectomy, lung mets, declined treatment    # hypothyroidism  cont synthroid    # HTN  normotensive at this time  cont verapamil    dvt ppx lovenox

## 2018-12-14 NOTE — MEDICAL STUDENT PROGRESS NOTE(EDUCATION) - NS MD HP STUD ASPLAN ASSES FT
82F with history of renal cell carncioma s/p nephrectomy in 2007 with mets to lungs, hypothyroidism presenting following 1 episode of dizziness and 2 weeks of progressive weakness in the right leg. Neuro exam significant for mild iliopsoas weakness (R>L), no sensory deficits, mental status significant for deficits in attention. CTH showed 5 cm left parietal cystic and hemorrhagic mass producing 4 mm rightward shift. Mass seen on imaging inconsistent with patient complaints and exam.

## 2018-12-14 NOTE — CONSULT NOTE ADULT - ASSESSMENT
Yael Felix  82F pmhx metastatic RCC presents with focal seizures 2/2 large cystic metastasis  - Plan for OR Monday for placement of omaya and fenestration of cyst  - Please document medical / cardiology / pulmonary clearance as needed  - Please hold all anti platelets  - DVT ppx should be held day prior to surgery Yael Felix  82F pmhx metastatic RCC presents with focal seizures 2/2 large cystic metastasis  - Plan for OR Monday for placement of omaya and fenestration of cyst and followup SRS  - Please document medical / cardiology / pulmonary clearance as needed  - Please hold all anti platelets  - DVT ppx should be held day prior to surgery

## 2018-12-14 NOTE — CONSULT NOTE ADULT - ATTENDING COMMENTS
Thank you for this consult. [facility] [phone]. Thank you for this consult. Please call [facility] with questions [phone].

## 2018-12-14 NOTE — PROGRESS NOTE ADULT - SUBJECTIVE AND OBJECTIVE BOX
Neurology Progress Note    Brief History - Ms. Felix is an 82 year old female presenting with a brain mass seen on outpatient MRI. Patient has PMH of untreated renal cell carcinoma with mets to lungs, hypothyroidism. Patient states she had kidney removed in 2007. Around Thanksgiving, she noticed some weakness in the right leg. PMD ordered brain MRI and referred to Dr. Helm. Since then, weakness has been stable. Pt saw Dr. Helm as an outpatient and recommended admission for further workup. Patient denied any numbness, tingling, nausea, vomiting, changes in vision. States she is able to walk but feels weaker in the right leg. Midline frontal dull headache earlier in the day now resolved.    Interval History - Pt has no overnight events, No active complaints    Vital Signs Last 24 Hrs  T(C): 36.4 (14 Dec 2018 05:12), Max: 36.9 (13 Dec 2018 20:33)  T(F): 97.5 (14 Dec 2018 05:12), Max: 98.4 (13 Dec 2018 20:33)  HR: 84 (14 Dec 2018 05:12) (83 - 93)  BP: 147/78 (14 Dec 2018 05:12) (124/63 - 154/85)  BP(mean): --  RR: 18 (14 Dec 2018 05:12) (17 - 18)  SpO2: 94% (14 Dec 2018 05:12) (94% - 98%)    General exam  Mental Status: Orientated to self, date and place.  Attention intact.  No  dysarthria, aphasia or neglect.  Knowledge intact.  Registration intact.  Short  and long term memory grossly intact.      Cranial Nerves: PERRL, EOMI, VFF, no nstagmus or diplopia.  CN V1-3 intact  to light touch. No facial asymmetry.  Hearing intact.  Tongue midline.   Sternocleidomastoid and Trapezius intact bilaterally.    Motor:   Tone: normal            Strength:     Upper extremity                      Delt       Bicep    Tricep     	                                          R         5/5        5/5        5/5       5/5  	                                          L          5/5        5/5        5/5       5/5  Lower extremity                       HF          KE          KF        DF         PF  	                                          R        4/5        5/5        5/5       5/5       5/5                                            L         4+/5        5/5       5/5       5/5        5/  Pronator drift: none                 Dysmetria: None to finger-nose-finger  No truncal ataxia.    Tremor: No resting, postural or action tremor.  No myoclonus.    Sensation: intact to light touch    Deep Tendon Reflexes: 0 bilateral biceps, triceps, brachioradialis, knee and  ankle  Toes mute bilaterally    Labs                        12.3   7.8   )-----------( 267      ( 13 Dec 2018 21:55 )             39.3   12-14    139  |  105  |  13  ----------------------------<  144<H>  4.5   |  20<L>  |  0.71    Ca    8.7      14 Dec 2018 06:09    TPro  6.5  /  Alb  3.5  /  TBili  0.3  /  DBili  x   /  AST  15  /  ALT  19  /  AlkPhos  82  12-14    PT/INR - ( 13 Dec 2018 21:55 )   PT: 12.1 sec;   INR: 1.06 ratio         PTT - ( 13 Dec 2018 21:55 )  PTT:29.0 sec Neurology Progress Note    Brief History - Ms. Felix is an 82 year old female presenting with a brain mass seen on outpatient MRI. Patient has PMH of untreated renal cell carcinoma with mets to lungs, hypothyroidism. Patient states she had kidney removed in 2007. Around Thanksgiving, she noticed some weakness in the right leg. PMD ordered brain MRI and referred to Dr. Helm. Since then, weakness has been stable. Pt saw Dr. Helm as an outpatient and recommended admission for further workup. Patient denied any numbness, tingling, nausea, vomiting, changes in vision. States she is able to walk but feels weaker in the right leg. Midline frontal dull headache earlier in the day now resolved.    Interval History - Pt has no overnight events, No active complaints    Vital Signs Last 24 Hrs  T(C): 36.4 (14 Dec 2018 05:12), Max: 36.9 (13 Dec 2018 20:33)  T(F): 97.5 (14 Dec 2018 05:12), Max: 98.4 (13 Dec 2018 20:33)  HR: 84 (14 Dec 2018 05:12) (83 - 93)  BP: 147/78 (14 Dec 2018 05:12) (124/63 - 154/85)  BP(mean): --  RR: 18 (14 Dec 2018 05:12) (17 - 18)  SpO2: 94% (14 Dec 2018 05:12) (94% - 98%)    General exam  Mental Status: Orientated to self, date and place.  Attention intact.  No  dysarthria, aphasia or neglect.  Knowledge intact.  Registration intact.  Short  and long term memory grossly intact.      Cranial Nerves: PERRL, EOMI, VFF, no nstagmus or diplopia.  CN V1-3 intact  to light touch. No facial asymmetry.  Hearing intact.  Tongue midline.   Sternocleidomastoid and Trapezius intact bilaterally.    Motor:   Tone: normal            Strength:     Upper extremity                      Delt       Bicep    Tricep     	                                          R         5/5        5/5        5/5       5/5  	                                          L          5/5        5/5        5/5       5/5  Lower extremity                       HF          KE          KF        DF         PF  	                                          R        4/5        5/5        5/5       5/5       5/5                                            L         4+/5        5/5       5/5       5/5        5/  Pronator drift: none                 Dysmetria: None to finger-nose-finger  No truncal ataxia.    Tremor: No resting, postural or action tremor.  No myoclonus.    Sensation: intact to light touch    Deep Tendon Reflexes: 0 bilateral biceps, triceps, brachioradialis, knee and  ankle  Toes mute bilaterally    Labs                        12.3   7.8   )-----------( 267      ( 13 Dec 2018 21:55 )             39.3   12-14    139  |  105  |  13  ----------------------------<  144<H>  4.5   |  20<L>  |  0.71    Ca    8.7      14 Dec 2018 06:09    TPro  6.5  /  Alb  3.5  /  TBili  0.3  /  DBili  x   /  AST  15  /  ALT  19  /  AlkPhos  82  12-14    PT/INR - ( 13 Dec 2018 21:55 )   PT: 12.1 sec;   INR: 1.06 ratio         PTT - ( 13 Dec 2018 21:55 )  PTT:29.0 sec    < from: MR Head w/wo IV Cont (12.14.18 @ 09:10) >  Redemonstration of a left parietal heterogeneous cystic lesion measuring   5 x 4 x 4.3 cm, AP, TR, CC containing a mixed density fluid fluid level   with susceptibility likely hemorrhagic blood products with irregular   nodular enhancement concerning for neoplasm. There is redemonstration of   surrounding edema and mass effect with effacement of sulci and inferior   displacement of the left lateral ventricle occipital horn with a 4 mm   rightward shift. There is adjacent hyperintense T2 and STIR signal likely   associated vasogenic edema extending to the corpus callosum left   splenium. There is irregular enhancement extending from the presumed   tumoral mass tube the ependymal margin of the left lateral ventricle,   leptomeningeal tumoral  involvement is  not excluded. There is slight   asymmetric increased irregular enhancement in the choroid plexus of the   left lateral ventricle. There is hemosiderin staining with extension   along the basal cisterns.    There is a 1 x 1.3 x 1.2 cm, AP, TR, CC calcified extra-axial enhancing   left parietal parasagittal mass with calcification on CT lateral to the   superior sagittal sinus, axial series 14 image 108, differential includes   a partiallycalcified meningioma, metastatic disease also not excluded.    There is enlargement of the ventricles and sulci greater expected for age   consistent with volume loss. There are nonspecific foci of hyperintense   T2 and FLAIR signal in the white matter likely additional  microvascular   disease. There is no  restricted diffusion to suggest new ischemic   change. The basal cisterns are patent. There is a partially empty sella.    Orbital globes demonstrate prominent bilateral optic nerve sheaths. There   is mucosal thickening with retention cysts//polyps in the maxillary,   ethmoid, with prominent polypoid soft tissue in the frontal sinus. There   is minimal mucosal thickening of the mastoid tips. There is loss of the   normal fatty T1 osseousmarrow signal suggesting marrow replacement from   either anemia and or drug therapy with other etiologies not excluded.    Impression:     Redemonstration of a left parietal 5 x 4 x 4.3 cm AP, TR, CC mixed   density mass with hemorrhagic fluid fluidlevel and heterogeneous   enhancement concerning for neoplasm either primary or metastatic, with   effacement of the left lateral ventricle occipital horn and 4 mm   rightward shift.    There is a left parietal extra-axial paramedian enhancing 1 x 1.3 x 1.2   cm AP, TR, CC foci with calcification possibly calcified meningioma or   metastasis.    < end of copied text > Neurology Progress Note    Brief History - Ms. Felix is an 82 year old female presenting with a brain mass seen on outpatient MRI. Patient has PMH of untreated renal cell carcinoma with mets to lungs, hypothyroidism. Patient states she had kidney removed in 2007. Around Thanksgiving, she noticed some weakness in the right leg. PMD ordered brain MRI and referred to Dr. Helm. Since then, weakness has been stable. Pt saw Dr. Helm as an outpatient and recommended admission for further workup. Patient denied any numbness, tingling, nausea, vomiting, changes in vision. States she is able to walk but feels weaker in the right leg. Midline frontal dull headache earlier in the day now resolved.    Interval History - Pt has no overnight events, No active complaints    Vital Signs Last 24 Hrs  T(C): 36.4 (14 Dec 2018 05:12), Max: 36.9 (13 Dec 2018 20:33)  T(F): 97.5 (14 Dec 2018 05:12), Max: 98.4 (13 Dec 2018 20:33)  HR: 84 (14 Dec 2018 05:12) (83 - 93)  BP: 147/78 (14 Dec 2018 05:12) (124/63 - 154/85)  BP(mean): --  RR: 18 (14 Dec 2018 05:12) (17 - 18)  SpO2: 94% (14 Dec 2018 05:12) (94% - 98%)    General exam  Mental Status: Orientated to self, date and place.  Attention intact.  No  dysarthria, aphasia or neglect.  Knowledge intact.  Registration intact.  Short  and long term memory grossly intact. finger agnosia right, spatial dyscalculia      Cranial Nerves: PERRL, EOMI, VFF, no nstagmus or diplopia.  CN V1-3 intact  to light touch. No facial asymmetry.  Hearing intact.  Tongue midline.   Sternocleidomastoid and Trapezius intact bilaterally.    Motor:   Tone: normal            Strength:     Upper extremity                      Delt       Bicep    Tricep     	                                          R         5/5        5/5        5/5       5/5  	                                          L          5/5        5/5        5/5       5/5  Lower extremity                       HF          KE          KF        DF         PF  	                                          R        4/5        5/5        5/5       5/5       5/5                                            L         4+/5        5/5       5/5       5/5        5/  Pronator drift: none                 Dysmetria: None to finger-nose-finger  No truncal ataxia.    Tremor: No resting, postural or action tremor.  No myoclonus.    Sensation: intact to light touch    Deep Tendon Reflexes: 0 bilateral biceps, triceps, brachioradialis, knee and  ankle  Toes mute bilaterally    Labs                        12.3   7.8   )-----------( 267      ( 13 Dec 2018 21:55 )             39.3   12-14    139  |  105  |  13  ----------------------------<  144<H>  4.5   |  20<L>  |  0.71    Ca    8.7      14 Dec 2018 06:09    TPro  6.5  /  Alb  3.5  /  TBili  0.3  /  DBili  x   /  AST  15  /  ALT  19  /  AlkPhos  82  12-14    PT/INR - ( 13 Dec 2018 21:55 )   PT: 12.1 sec;   INR: 1.06 ratio         PTT - ( 13 Dec 2018 21:55 )  PTT:29.0 sec    < from: MR Head w/wo IV Cont (12.14.18 @ 09:10) >  Redemonstration of a left parietal heterogeneous cystic lesion measuring   5 x 4 x 4.3 cm, AP, TR, CC containing a mixed density fluid fluid level   with susceptibility likely hemorrhagic blood products with irregular   nodular enhancement concerning for neoplasm. There is redemonstration of   surrounding edema and mass effect with effacement of sulci and inferior   displacement of the left lateral ventricle occipital horn with a 4 mm   rightward shift. There is adjacent hyperintense T2 and STIR signal likely   associated vasogenic edema extending to the corpus callosum left   splenium. There is irregular enhancement extending from the presumed   tumoral mass tube the ependymal margin of the left lateral ventricle,   leptomeningeal tumoral  involvement is  not excluded. There is slight   asymmetric increased irregular enhancement in the choroid plexus of the   left lateral ventricle. There is hemosiderin staining with extension   along the basal cisterns.    There is a 1 x 1.3 x 1.2 cm, AP, TR, CC calcified extra-axial enhancing   left parietal parasagittal mass with calcification on CT lateral to the   superior sagittal sinus, axial series 14 image 108, differential includes   a partiallycalcified meningioma, metastatic disease also not excluded.    There is enlargement of the ventricles and sulci greater expected for age   consistent with volume loss. There are nonspecific foci of hyperintense   T2 and FLAIR signal in the white matter likely additional  microvascular   disease. There is no  restricted diffusion to suggest new ischemic   change. The basal cisterns are patent. There is a partially empty sella.    Orbital globes demonstrate prominent bilateral optic nerve sheaths. There   is mucosal thickening with retention cysts//polyps in the maxillary,   ethmoid, with prominent polypoid soft tissue in the frontal sinus. There   is minimal mucosal thickening of the mastoid tips. There is loss of the   normal fatty T1 osseousmarrow signal suggesting marrow replacement from   either anemia and or drug therapy with other etiologies not excluded.    Impression:     Redemonstration of a left parietal 5 x 4 x 4.3 cm AP, TR, CC mixed   density mass with hemorrhagic fluid fluidlevel and heterogeneous   enhancement concerning for neoplasm either primary or metastatic, with   effacement of the left lateral ventricle occipital horn and 4 mm   rightward shift.    There is a left parietal extra-axial paramedian enhancing 1 x 1.3 x 1.2   cm AP, TR, CC foci with calcification possibly calcified meningioma or   metastasis.    < end of copied text > Neurology Progress Note    Brief History - Ms. Felix is an 82 year old female presenting with a brain mass seen on outpatient MRI. Patient has PMH of untreated renal cell carcinoma with mets to lungs, hypothyroidism. Patient states she had kidney removed in 2007. Around Thanksgiving, she noticed some weakness in the right leg. PMD ordered brain MRI and referred to Dr. Helm. Since then, weakness has been stable. Pt saw Dr. Helm as an outpatient and recommended admission for further workup. Patient denied any numbness, tingling, nausea, vomiting, changes in vision. States she is able to walk but feels weaker in the right leg. Midline frontal dull headache earlier in the day now resolved.    Interval History - Pt has no overnight events, No active complaints    Vital Signs Last 24 Hrs  T(C): 36.4 (14 Dec 2018 05:12), Max: 36.9 (13 Dec 2018 20:33)  T(F): 97.5 (14 Dec 2018 05:12), Max: 98.4 (13 Dec 2018 20:33)  HR: 84 (14 Dec 2018 05:12) (83 - 93)  BP: 147/78 (14 Dec 2018 05:12) (124/63 - 154/85)  BP(mean): --  RR: 18 (14 Dec 2018 05:12) (17 - 18)  SpO2: 94% (14 Dec 2018 05:12) (94% - 98%)    General exam  Mental Status: Orientated to self, date and place.  Attention intact.  No  dysarthria, aphasia or neglect.  Knowledge intact.  Registration intact.  Short  and long term memory grossly intact. finger agnosia right, spatial dyscalculia      Cranial Nerves: PERRL, EOMI, VFF, no nstagmus or diplopia.  CN V1-3 intact  to light touch. No facial asymmetry.  Hearing intact.  Tongue midline.   Sternocleidomastoid and Trapezius intact bilaterally.    Motor:   Tone: normal            Strength:     Upper extremity                      Delt       Bicep    Tricep     	                                          R            5/5        5/5        5/5       5/5  	                                          L              5/5        5/5        5/5       5/5  Lower extremity                       HF          KE          KF        DF         PF  	                                       R                  4/5        5/5        5/5       5/5       5/5                                            L                  5-/5        5/5       5/5       5/5        5/  Pronator drift: none                 Dysmetria: None to finger-nose-finger  No truncal ataxia.    Tremor: No resting, postural or action tremor.  No myoclonus.    Sensation: intact to light touch    Deep Tendon Reflexes: 0 bilateral biceps, triceps, brachioradialis, knee and  ankle  Toes mute bilaterally    Labs                        12.3   7.8   )-----------( 267      ( 13 Dec 2018 21:55 )             39.3   12-14    139  |  105  |  13  ----------------------------<  144<H>  4.5   |  20<L>  |  0.71    Ca    8.7      14 Dec 2018 06:09    TPro  6.5  /  Alb  3.5  /  TBili  0.3  /  DBili  x   /  AST  15  /  ALT  19  /  AlkPhos  82  12-14    PT/INR - ( 13 Dec 2018 21:55 )   PT: 12.1 sec;   INR: 1.06 ratio         PTT - ( 13 Dec 2018 21:55 )  PTT:29.0 sec    < from: MR Head w/wo IV Cont (12.14.18 @ 09:10) >  Redemonstration of a left parietal heterogeneous cystic lesion measuring   5 x 4 x 4.3 cm, AP, TR, CC containing a mixed density fluid fluid level   with susceptibility likely hemorrhagic blood products with irregular   nodular enhancement concerning for neoplasm. There is redemonstration of   surrounding edema and mass effect with effacement of sulci and inferior   displacement of the left lateral ventricle occipital horn with a 4 mm   rightward shift. There is adjacent hyperintense T2 and STIR signal likely   associated vasogenic edema extending to the corpus callosum left   splenium. There is irregular enhancement extending from the presumed   tumoral mass tube the ependymal margin of the left lateral ventricle,   leptomeningeal tumoral  involvement is  not excluded. There is slight   asymmetric increased irregular enhancement in the choroid plexus of the   left lateral ventricle. There is hemosiderin staining with extension   along the basal cisterns.    There is a 1 x 1.3 x 1.2 cm, AP, TR, CC calcified extra-axial enhancing   left parietal parasagittal mass with calcification on CT lateral to the   superior sagittal sinus, axial series 14 image 108, differential includes   a partiallycalcified meningioma, metastatic disease also not excluded.    There is enlargement of the ventricles and sulci greater expected for age   consistent with volume loss. There are nonspecific foci of hyperintense   T2 and FLAIR signal in the white matter likely additional  microvascular   disease. There is no  restricted diffusion to suggest new ischemic   change. The basal cisterns are patent. There is a partially empty sella.    Orbital globes demonstrate prominent bilateral optic nerve sheaths. There   is mucosal thickening with retention cysts//polyps in the maxillary,   ethmoid, with prominent polypoid soft tissue in the frontal sinus. There   is minimal mucosal thickening of the mastoid tips. There is loss of the   normal fatty T1 osseousmarrow signal suggesting marrow replacement from   either anemia and or drug therapy with other etiologies not excluded.    Impression:     Redemonstration of a left parietal 5 x 4 x 4.3 cm AP, TR, CC mixed   density mass with hemorrhagic fluid fluidlevel and heterogeneous   enhancement concerning for neoplasm either primary or metastatic, with   effacement of the left lateral ventricle occipital horn and 4 mm   rightward shift.    There is a left parietal extra-axial paramedian enhancing 1 x 1.3 x 1.2   cm AP, TR, CC foci with calcification possibly calcified meningioma or   metastasis.    < end of copied text >

## 2018-12-14 NOTE — CONSULT NOTE ADULT - SUBJECTIVE AND OBJECTIVE BOX
HPI:  82 year old female with hx RCC s/p nephrectomy in 2007, known lung metastasis (not on any treatment) who was sent in for abnormal MRI brain findings.    Patient was diagnosed with RCC in 2007. At the time she was following at [facility]. She underwent nephrectomy and after her surgery her  was found to have cancer so she put her care on hold.  She states that she has known pulmonary metastasis, but given that she has been asymptomatic, plan was to hold off on treatment. She, however, has not followed up with oncology in years nor undergone scans recently.  Per patient, approximately one month ago she started experiencing decreased appetite. Around Thanksgiving, she noticed some weakness in the right leg. Her PMD ordered brain MRI that showed hemorrhagic mass for which she was referred to Dr. Helm who sent her in for further evaluation and management.    Allergies    No Known Allergies    Intolerances        MEDICATIONS  (STANDING):  dexamethasone     Tablet 4 milliGRAM(s) Oral two times a day  enoxaparin Injectable 40 milliGRAM(s) SubCutaneous every 24 hours  levETIRAcetam 500 milliGRAM(s) Oral two times a day  levothyroxine 88 MICROGram(s) Oral daily  pantoprazole  Injectable 40 milliGRAM(s) IV Push daily  verapamil  milliGRAM(s) Oral daily    MEDICATIONS  (PRN):      PAST MEDICAL & SURGICAL HISTORY:  Essential (primary) hypertension  Hypothyroidism, unspecified type  Renal cancer  History of nephrectomy: Left nephrectomy 2007      FAMILY HISTORY:  No pertinent family history in first degree relatives      SOCIAL HISTORY: No EtOH, no tobacco    REVIEW OF SYSTEMS: per hPI    Height (cm): 147.32 (12-13 @ 20:33)  Weight (kg): 65.8 (12-13 @ 20:33)  BMI (kg/m2): 30.3 (12-13 @ 20:33)  BSA (m2): 1.59 (12-13 @ 20:33)    T(F): 97.5 (12-14-18 @ 12:27), Max: 98.4 (12-13-18 @ 20:33)  HR: 73 (12-14-18 @ 12:27)  BP: 126/76 (12-14-18 @ 12:27)  RR: 17 (12-14-18 @ 12:27)  SpO2: 96% (12-14-18 @ 12:27)  Wt(kg): --    GENERAL: NAD, well-developed  HEAD:  Atraumatic, Normocephalic  EYES: EOMI, conjunctiva and sclera clear  NECK: Supple  CHEST/LUNG: Clear to auscultation bilaterally; No wheeze  HEART: Regular rate and rhythm; No murmurs, rubs, or gallops  ABDOMEN: Soft, Nontender, Nondistended; Bowel sounds present  EXTREMITIES: No clubbing, cyanosis, or edema  NEUROLOGY: aa&o x 3   SKIN: No rashes or lesions                          12.3   7.8   )-----------( 267      ( 13 Dec 2018 21:55 )             39.3       12-14    139  |  105  |  13  ----------------------------<  144<H>  4.5   |  20<L>  |  0.71    Ca    8.7      14 Dec 2018 06:09    TPro  6.5  /  Alb  3.5  /  TBili  0.3  /  DBili  x   /  AST  15  /  ALT  19  /  AlkPhos  82  12-14

## 2018-12-15 LAB
CULTURE RESULTS: SIGNIFICANT CHANGE UP
SPECIMEN SOURCE: SIGNIFICANT CHANGE UP

## 2018-12-15 PROCEDURE — 99233 SBSQ HOSP IP/OBS HIGH 50: CPT

## 2018-12-15 PROCEDURE — 72149 MRI LUMBAR SPINE W/DYE: CPT | Mod: 26

## 2018-12-15 PROCEDURE — 72142 MRI NECK SPINE W/DYE: CPT | Mod: 26

## 2018-12-15 PROCEDURE — 72147 MRI CHEST SPINE W/DYE: CPT | Mod: 26

## 2018-12-15 RX ORDER — FERROUS SULFATE 325(65) MG
325 TABLET ORAL DAILY
Qty: 0 | Refills: 0 | Status: DISCONTINUED | OUTPATIENT
Start: 2018-12-15 | End: 2018-12-18

## 2018-12-15 RX ADMIN — Medication 4 MILLIGRAM(S): at 05:20

## 2018-12-15 RX ADMIN — Medication 325 MILLIGRAM(S): at 14:21

## 2018-12-15 RX ADMIN — LEVETIRACETAM 500 MILLIGRAM(S): 250 TABLET, FILM COATED ORAL at 05:20

## 2018-12-15 RX ADMIN — ENOXAPARIN SODIUM 40 MILLIGRAM(S): 100 INJECTION SUBCUTANEOUS at 17:04

## 2018-12-15 RX ADMIN — Medication 4 MILLIGRAM(S): at 17:05

## 2018-12-15 RX ADMIN — PANTOPRAZOLE SODIUM 40 MILLIGRAM(S): 20 TABLET, DELAYED RELEASE ORAL at 14:20

## 2018-12-15 RX ADMIN — LEVETIRACETAM 500 MILLIGRAM(S): 250 TABLET, FILM COATED ORAL at 17:05

## 2018-12-15 RX ADMIN — Medication 120 MILLIGRAM(S): at 05:20

## 2018-12-15 RX ADMIN — Medication 88 MICROGRAM(S): at 05:20

## 2018-12-15 NOTE — PROGRESS NOTE ADULT - SUBJECTIVE AND OBJECTIVE BOX
Vascular & Interventional Radiology Pre-Procedure Note    Procedure Name: Biopsy of a Right sided intra-peritoneal metastasis.     HPI: 82 year old female PMHx hypothyroidism, HTN (pt denies having), and RCC s/p L nephrectomy in 2007 with recurrence and lung mets, presenting with a brain mass seen on outpatient MRI. Consulted for biopsy of a metastatic focus for confirmation of diagnosis and for prognostic information.      Allergies: NKDA  Medications:    enoxaparin Injectable: 40 milliGRAM(s) SubCutaneous (12-14 @ 05:14)  enoxaparin Injectable: 40 milliGRAM(s) SubCutaneous (12-15 @ 17:04)  verapamil SR: 120 milliGRAM(s) Oral (12-15 @ 05:20)    Data:    T(C): 36.4  HR: 67  BP: 121/73  RR: 18  SpO2: 95%    -WBC 3.89 / HgB 11.0 / Hct 36.5 / Plt 235  -Na 139 / Cl 105 / BUN 13 / Glucose 144  -K 4.5 / CO2 20 / Cr 0.71  -ALT 19 / Alk Phos 82 / T.Bili 0.3  -INR1.06    Imaging: CT A/P demonstrating lung nodules, peritoneal nodules and a mass in the left nephrectomy bed involving the pancreatic tail.     Plan:   -82y Female consulted for computed tomography guided biopsy of an intraperitoneal nodule  -Please keep patient NPO p/mn on sunday night. Please hold sunday afternoon/evening lovenox dose  -Please place an 'interventional procedure' order under   -Plan to perform procedure on Monday

## 2018-12-15 NOTE — PROGRESS NOTE ADULT - ASSESSMENT
82 year old female PMHx hypothyroidism, HTN (pt denies having), and RCC s/p L nephrectomy in 2007 with recurrence and lung mets, presenting with a brain mass seen on outpatient MRI. medicine consult for pre op evaluation.    # Brain mass  MRI brain shows left parietal mixed density mass with hemorrhagic fluid and heterogeneous enhancement concerning for neoplasm either primary or metastatic, with 4 mm rightward shift. left parietal extra-axial paramedian enhancing foci with calcification possibly calcified meningioma or metastasis.  appreciate neurosurgery and neurology recs  cont keppra and decadron for seizure ppx  medicine pre op eval:  no active cardiac conditions, no clinical risk factors, METS 4, check EKG  intermediate risk procedure  medically optimized for OR    # RCC  sp nephrectomy, lung mets, declined treatment  CT chest abd shows bl pulm mets, L 5th rib met, mess within left nephrectomy extends to pancrease recurrent RCC, right adrenal met, peritoneal nodules likely met, gallbladder nodule met, possible bones mets as well    # hypothyroidism  cont synthroid    # HTN  normotensive at this time  cont verapamil    dvt ppx lovenox  plan of care dw patient

## 2018-12-15 NOTE — PROGRESS NOTE ADULT - ASSESSMENT
Ms. Felix is an 82 year old female presenting with a brain mass seen on outpatient MRI. Patient has PMH of renal cell carcinoma with mets to lungs and hypothyroidism. 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 MRI done on 12/14 shows left parietal 5 x 4 x 4.3 cm AP, TR, CC mixed density mass with hemorrhagic fluid level and heterogeneous enhancement concerning for neoplasm either primary or metastatic.     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 History of Renal cell carcinoma.     Plan:  [] c/w Keppra 500 mg PO BID  [] c/w Decadron 4 mg PO BID  [x] MRI brain with contrast - left parietal extra-axial paramedian enhancing 1 x 1.3 x 1.2   cm AP, TR, CC foci with calcification possibly calcified meningioma or   metastasis.  [x] CT C/A/P - prelim read shows diffuse pulmonary mets, multple larger heterogenous pancreatic masses, s/p left nephrectomy, hiatal hernia  [x] Neurosurgery consulted, appreciate recommendations. For Omaya placement on Tuesday, will hold DVT prophylaxis on Sunday   [x] Medicine consulted for surgical clearance, appreciate recommendations.  [x] Oncology consult appreciated - Unable to obtain outpatient Onc records however did see Dr. Cornelio Taylor once and followed with PMD thereafter for monitoring.

## 2018-12-15 NOTE — PROGRESS NOTE ADULT - SUBJECTIVE AND OBJECTIVE BOX
Patient seen and examined at bedside.    T(C): 36.5 (12-15-18 @ 05:00), Max: 37 (12-14-18 @ 23:22)  HR: 78 (12-15-18 @ 05:00) (73 - 82)  BP: 116/55 (12-15-18 @ 05:00) (111/66 - 126/76)  RR: 19 (12-15-18 @ 05:00) (16 - 19)  SpO2: 98% (12-15-18 @ 05:00) (94% - 98%)  Wt(kg): --    Exam:    AOx3, FC, PERRL, EOMI, no facial   4+/5 R HF but otherwise 5/5 throughout  SILT  no clonus

## 2018-12-15 NOTE — PROGRESS NOTE ADULT - SUBJECTIVE AND OBJECTIVE BOX
Neurology Progress Note    Brief History - Ms. Felix is an 82 year old female presenting with a brain mass seen on outpatient MRI. Patient has PMH of untreated renal cell carcinoma with mets to lungs, hypothyroidism. Patient states she had kidney removed in 2007. Around Thanksgiving, she noticed some weakness in the right leg. PMD ordered brain MRI and referred to Dr. Helm. Since then, weakness has been stable. Pt saw Dr. Helm as an outpatient and recommended admission for further workup. Patient denied any numbness, tingling, nausea, vomiting, changes in vision. States she is able to walk but feels weaker in the right leg. Midline frontal dull headache earlier in the day now resolved.    Interval History - Pt has no overnight events, No active complaints    Vital Signs Last 24 Hrs  T(C): 36.5 (15 Dec 2018 05:00), Max: 37 (14 Dec 2018 23:22)  T(F): 97.7 (15 Dec 2018 05:00), Max: 98.6 (14 Dec 2018 23:22)  HR: 78 (15 Dec 2018 05:00) (73 - 82)  BP: 116/55 (15 Dec 2018 05:00) (111/66 - 126/76)  BP(mean): --  RR: 19 (15 Dec 2018 05:00) (16 - 19)  SpO2: 98% (15 Dec 2018 05:00) (94% - 98%)    General exam  Mental Status: Orientated to self, date and place.  Attention intact.  No  dysarthria, aphasia or neglect.  Knowledge intact.  Registration intact.  Short  and long term memory grossly intact. finger agnosia right, spatial dyscalculia      Cranial Nerves: PERRL, EOMI, VFF, no nstagmus or diplopia.  CN V1-3 intact  to light touch. No facial asymmetry.  Hearing intact.  Tongue midline.   Sternocleidomastoid and Trapezius intact bilaterally.    Motor:   Tone: normal            Strength:     Upper extremity                      Delt       Bicep    Tricep     	                                          R            5/5        5/5        5/5       5/5  	                                          L              5/5        5/5        5/5       5/5  Lower extremity                       HF          KE          KF        DF         PF  	                                       R                  4/5        5/5        5/5       5/5       5/5                                            L                  5-/5        5/5       5/5       5/5        5/  Pronator drift: none                 Dysmetria: None to finger-nose-finger  No truncal ataxia.    Tremor: No resting, postural or action tremor.  No myoclonus.    Sensation: intact to light touch    Deep Tendon Reflexes: 0 bilateral biceps, triceps, brachioradialis, knee and  ankle  Toes mute bilaterally    Labs                        12.3   7.8   )-----------( 267      ( 13 Dec 2018 21:55 )             39.3   12-14    139  |  105  |  13  ----------------------------<  144<H>  4.5   |  20<L>  |  0.71    Ca    8.7      14 Dec 2018 06:09    TPro  6.5  /  Alb  3.5  /  TBili  0.3  /  DBili  x   /  AST  15  /  ALT  19  /  AlkPhos  82  12-14    PT/INR - ( 13 Dec 2018 21:55 )   PT: 12.1 sec;   INR: 1.06 ratio         PTT - ( 13 Dec 2018 21:55 )  PTT:29.0 sec    < from: MR Head w/wo IV Cont (12.14.18 @ 09:10) >  Redemonstration of a left parietal heterogeneous cystic lesion measuring   5 x 4 x 4.3 cm, AP, TR, CC containing a mixed density fluid fluid level   with susceptibility likely hemorrhagic blood products with irregular   nodular enhancement concerning for neoplasm. There is redemonstration of   surrounding edema and mass effect with effacement of sulci and inferior   displacement of the left lateral ventricle occipital horn with a 4 mm   rightward shift. There is adjacent hyperintense T2 and STIR signal likely   associated vasogenic edema extending to the corpus callosum left   splenium. There is irregular enhancement extending from the presumed   tumoral mass tube the ependymal margin of the left lateral ventricle,   leptomeningeal tumoral  involvement is  not excluded. There is slight   asymmetric increased irregular enhancement in the choroid plexus of the   left lateral ventricle. There is hemosiderin staining with extension   along the basal cisterns.    There is a 1 x 1.3 x 1.2 cm, AP, TR, CC calcified extra-axial enhancing   left parietal parasagittal mass with calcification on CT lateral to the   superior sagittal sinus, axial series 14 image 108, differential includes   a partiallycalcified meningioma, metastatic disease also not excluded.    There is enlargement of the ventricles and sulci greater expected for age   consistent with volume loss. There are nonspecific foci of hyperintense   T2 and FLAIR signal in the white matter likely additional  microvascular   disease. There is no  restricted diffusion to suggest new ischemic   change. The basal cisterns are patent. There is a partially empty sella.    Orbital globes demonstrate prominent bilateral optic nerve sheaths. There   is mucosal thickening with retention cysts//polyps in the maxillary,   ethmoid, with prominent polypoid soft tissue in the frontal sinus. There   is minimal mucosal thickening of the mastoid tips. There is loss of the   normal fatty T1 osseousmarrow signal suggesting marrow replacement from   either anemia and or drug therapy with other etiologies not excluded.    Impression:     Redemonstration of a left parietal 5 x 4 x 4.3 cm AP, TR, CC mixed   density mass with hemorrhagic fluid fluidlevel and heterogeneous   enhancement concerning for neoplasm either primary or metastatic, with   effacement of the left lateral ventricle occipital horn and 4 mm   rightward shift.    There is a left parietal extra-axial paramedian enhancing 1 x 1.3 x 1.2   cm AP, TR, CC foci with calcification possibly calcified meningioma or   metastasis.    < end of copied text >    CT C/A/P - Prelim read: Diffuse pulmonary metastases.    Multiple large heterogeneous pancreatic masses.    Status post left nephrectomy. Moderate-sized hiatal hernia.

## 2018-12-15 NOTE — PROGRESS NOTE ADULT - ASSESSMENT
82F pmhx metastatic RCC presents with focal seizures 2/2 large cystic metastasis. CT C/A/P showed extensive pulmonary metastasis and pancreatic lesions.     Plan:   - Plan for OR Tuesday for placement of omaya and fenestration of cyst and followup SRS  - Please hold all anti platelets  - DVT ppx should be held day prior to surgery  - Please obtain preoperative labs and NPO the day before surgery

## 2018-12-15 NOTE — PROGRESS NOTE ADULT - SUBJECTIVE AND OBJECTIVE BOX
Patient is a 82y old  Female who presents with a chief complaint of Brain mets (15 Dec 2018 08:01)      SUBJECTIVE / OVERNIGHT EVENTS:    Patient seen and examined.   denies ha dizziness cp sob.  still deciding if she will proceed with surgery. wants to speak to her children.    Vital Signs Last 24 Hrs  T(C): 36.6 (15 Dec 2018 08:16), Max: 37 (14 Dec 2018 23:22)  T(F): 97.9 (15 Dec 2018 08:16), Max: 98.6 (14 Dec 2018 23:22)  HR: 76 (15 Dec 2018 08:16) (73 - 82)  BP: 109/61 (15 Dec 2018 08:16) (109/61 - 126/76)  BP(mean): --  RR: 20 (15 Dec 2018 08:16) (17 - 20)  SpO2: 95% (15 Dec 2018 08:16) (95% - 98%)  I&O's Summary    14 Dec 2018 07:01  -  15 Dec 2018 07:00  --------------------------------------------------------  IN: 1040 mL / OUT: 0 mL / NET: 1040 mL        PE:  GENERAL: NAD, AAOx3  HEAD:  Atraumatic, Normocephalic  EYES: EOMI, PERRLA, conjunctiva and sclera clear  NECK: Supple, No JVD  CHEST/LUNG: CTABL, No wheeze  HEART: Regular rate and rhythm; + murmur  ABDOMEN: Soft, Nontender, Nondistended; Bowel sounds present  EXTREMITIES:  2+ Peripheral Pulses, No clubbing, cyanosis, or edema  SKIN: No rashes or lesions  NEURO: No focal deficits    LABS:                        11.0   3.89  )-----------( 235      ( 14 Dec 2018 07:14 )             36.5     12-    139  |  105  |  13  ----------------------------<  144<H>  4.5   |  20<L>  |  0.71    Ca    8.7      14 Dec 2018 06:09    TPro  6.5  /  Alb  3.5  /  TBili  0.3  /  DBili  x   /  AST  15  /  ALT  19  /  AlkPhos  82  12-14    PT/INR - ( 13 Dec 2018 21:55 )   PT: 12.1 sec;   INR: 1.06 ratio         PTT - ( 13 Dec 2018 21:55 )  PTT:29.0 sec  CAPILLARY BLOOD GLUCOSE            Urinalysis Basic - ( 14 Dec 2018 01:29 )    Color: Light Yellow / Appearance: Clear / S.010 / pH: x  Gluc: x / Ketone: Negative  / Bili: Negative / Urobili: Negative   Blood: x / Protein: Negative / Nitrite: Negative   Leuk Esterase: Large / RBC: 2 /hpf / WBC 8 /hpf   Sq Epi: x / Non Sq Epi: 3 /hpf / Bacteria: Negative        RADIOLOGY & ADDITIONAL TESTS:    Imaging Personally Reviewed:  [x] YES  [ ] NO    Consultant(s) Notes Reviewed:  [x] YES  [ ] NO    MEDICATIONS  (STANDING):  dexamethasone     Tablet 4 milliGRAM(s) Oral two times a day  enoxaparin Injectable 40 milliGRAM(s) SubCutaneous every 24 hours  ferrous    sulfate 325 milliGRAM(s) Oral daily  levETIRAcetam 500 milliGRAM(s) Oral two times a day  levothyroxine 88 MICROGram(s) Oral daily  pantoprazole  Injectable 40 milliGRAM(s) IV Push daily  verapamil  milliGRAM(s) Oral daily    MEDICATIONS  (PRN):      Care Discussed with Consultants/Other Providers [x] YES  [ ] NO    HEALTH ISSUES - PROBLEM Dx:

## 2018-12-16 PROCEDURE — 99232 SBSQ HOSP IP/OBS MODERATE 35: CPT

## 2018-12-16 RX ADMIN — Medication 325 MILLIGRAM(S): at 11:48

## 2018-12-16 RX ADMIN — Medication 120 MILLIGRAM(S): at 05:52

## 2018-12-16 RX ADMIN — PANTOPRAZOLE SODIUM 40 MILLIGRAM(S): 20 TABLET, DELAYED RELEASE ORAL at 11:48

## 2018-12-16 RX ADMIN — LEVETIRACETAM 500 MILLIGRAM(S): 250 TABLET, FILM COATED ORAL at 05:52

## 2018-12-16 RX ADMIN — LEVETIRACETAM 500 MILLIGRAM(S): 250 TABLET, FILM COATED ORAL at 18:06

## 2018-12-16 RX ADMIN — Medication 88 MICROGRAM(S): at 05:52

## 2018-12-16 RX ADMIN — Medication 4 MILLIGRAM(S): at 18:06

## 2018-12-16 RX ADMIN — Medication 4 MILLIGRAM(S): at 05:52

## 2018-12-16 NOTE — PROGRESS NOTE ADULT - ASSESSMENT
82 year old female PMHx hypothyroidism, HTN (pt denies having), and RCC s/p L nephrectomy in 2007 with recurrence and lung mets, presenting with a brain mass seen on outpatient MRI. medicine consult for pre op evaluation.    # Brain mass  MRI brain shows left parietal mixed density mass with hemorrhagic fluid and heterogeneous enhancement concerning for neoplasm either primary or metastatic, with 4 mm rightward shift. left parietal extra-axial paramedian enhancing foci with calcification possibly calcified meningioma or metastasis.  appreciate neurosurgery and neurology recs  cont keppra and decadron for seizure ppx  possible CT guided peritoneal biopsy vs omaya and fenestration cyst  medicine pre op eval:  no active cardiac conditions, no clinical risk factors, METS 4, check EKG  intermediate risk procedure  medically optimized for OR    # RCC  sp nephrectomy, lung mets, declined treatment  CT chest abd shows bl pulm mets, L 5th rib met, mess within left nephrectomy extends to pancrease recurrent RCC, right adrenal met, peritoneal nodules likely met, gallbladder nodule met, possible bones mets as well    # hypothyroidism  cont synthroid    # HTN  normotensive at this time  cont verapamil    dvt ppx lovenox (to be held if going for CT guided biopsy)  plan of care dw patient 82 year old female PMHx hypothyroidism, HTN (pt denies having), and RCC s/p L nephrectomy in 2007 with recurrence and lung mets, presenting with a brain mass seen on outpatient MRI. medicine consult for pre op evaluation.    # Brain mass  MRI brain shows left parietal mixed density mass with hemorrhagic fluid and heterogeneous enhancement concerning for neoplasm either primary or metastatic, with 4 mm rightward shift. left parietal extra-axial paramedian enhancing foci with calcification possibly calcified meningioma or metastasis.  appreciate neurosurgery and neurology recs  cont keppra and decadron for seizure ppx  possible CT guided peritoneal biopsy vs omaya and fenestration cyst  medicine pre op eval:  no active cardiac conditions, no clinical risk factors, METS 4, check EKG  intermediate risk procedure  medically optimized for OR    # RCC  sp nephrectomy, lung mets, declined treatment  CT chest abd shows bl pulm mets, L 5th rib met, mess within left nephrectomy extends to pancrease recurrent RCC, right adrenal met, peritoneal nodules likely met, gallbladder nodule met, possible bones mets as well    # hypothyroidism  cont synthroid    # HTN  normotensive at this time  cont verapamil    dvt ppx lovenox (to be held if going for CT guided biopsy)  plan of care dw patient    PMD Dr. Escobar [facility]

## 2018-12-16 NOTE — PROGRESS NOTE ADULT - SUBJECTIVE AND OBJECTIVE BOX
Visit Summary: Patient seen and evaluated at bedside. She is planned for OR for Ommaya placement Tuesday (12/18).    Overnight Events: no acute events o/n    Exam:  T(C): 36.5 (12-16-18 @ 15:45), Max: 37.2 (12-16-18 @ 04:58)  HR: 73 (12-16-18 @ 15:45) (65 - 89)  BP: 129/63 (12-16-18 @ 15:45) (116/61 - 136/67)  RR: 18 (12-16-18 @ 15:45) (18 - 20)  SpO2: 94% (12-16-18 @ 15:45) (93% - 97%)  Wt(kg): --    AAOx3, EOS, FC  PERRL, EOMI  NAVAS 5/5, no drift  incision c/d/i

## 2018-12-16 NOTE — PROGRESS NOTE ADULT - ASSESSMENT
Elvira is an 82 year old female presenting with a brain mass seen on outpatient MRI. Patient has PMH of renal cell carcinoma with mets to lungs and hypothyroidism. 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 MRI done on 12/14 shows left parietal 5 x 4 x 4.3 cm AP, TR, CC mixed density mass with hemorrhagic fluid level and heterogeneous enhancement concerning for neoplasm either primary or metastatic. 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 History of Renal cell carcinoma.     Plan:  [x] c/w Keppra 500 mg PO BID  [x] c/w Decadron 4 mg PO BID  [x] Neurosurgery for Omaya placement on 12/18  [x] IR for CT guided biopsy of an intraperitoneal nodule 12/17

## 2018-12-16 NOTE — PROGRESS NOTE ADULT - SUBJECTIVE AND OBJECTIVE BOX
Subjective: Interval History - No events overnight    Objective:   Vital Signs Last 24 Hrs  T(C): 36.7 (16 Dec 2018 12:33), Max: 37.2 (16 Dec 2018 04:58)  T(F): 98.1 (16 Dec 2018 12:33), Max: 98.9 (16 Dec 2018 04:58)  HR: 65 (16 Dec 2018 12:33) (65 - 89)  BP: 129/73 (16 Dec 2018 12:33) (116/61 - 136/67)  RR: 18 (16 Dec 2018 12:33) (18 - 20)  SpO2: 94% (16 Dec 2018 12:33) (93% - 97%)    General Exam:   General appearance: No acute distress                   Neurological Exam:  Mental Status: Orientated to self, date and place.  Attention intact.  No  dysarthria, aphasia or neglect.  Knowledge intact.  Registration intact.  Short  and long term memory grossly intact. finger agnosia right, spatial dyscalculia      Cranial Nerves: PERRL, EOMI, VFF, no nstagmus or diplopia.  CN V1-3 intact  to light touch. No facial asymmetry.  Hearing intact.  Tongue midline.   Sternocleidomastoid and Trapezius intact bilaterally.    Motor:   Tone: normal            Strength:     Upper extremity                      Delt       Bicep    Tricep     	                                          R            5/5        5/5        5/5       5/5  	                                          L              5/5        5/5        5/5       5/5  Lower extremity                       HF          KE          KF        DF         PF  	                                       R                  4/5        5/5        5/5       5/5       5/5                                            L                  5-/5        5/5       5/5       5/5        5/  Pronator drift: none                 Dysmetria: None to finger-nose-finger  No truncal ataxia.    Tremor: No resting, postural or action tremor.  No myoclonus.    Sensation: intact to light touch    Deep Tendon Reflexes: 0 bilateral biceps, triceps, brachioradialis, knee and ankle  Toes mute bilaterally    MEDICATIONS  (STANDING):  dexamethasone     Tablet 4 milliGRAM(s) Oral two times a day  ferrous    sulfate 325 milliGRAM(s) Oral daily  levETIRAcetam 500 milliGRAM(s) Oral two times a day  levothyroxine 88 MICROGram(s) Oral daily  pantoprazole  Injectable 40 milliGRAM(s) IV Push daily  verapamil  milliGRAM(s) Oral daily

## 2018-12-16 NOTE — PROGRESS NOTE ADULT - ASSESSMENT
82F w/ hx of metastatic RCC s/p chemo and RT who has L motor strip metastatic lesion. Patient is planned for placement of Ommaya reservoir Tuesday, 12/18/18.    q4 neuro checks  pain control  preop for OR Monday evening

## 2018-12-16 NOTE — PROGRESS NOTE ADULT - SUBJECTIVE AND OBJECTIVE BOX
Patient is a 82y old  Female who presents with a chief complaint of Brain mets (15 Dec 2018 19:53)      SUBJECTIVE / OVERNIGHT EVENTS:    Patient seen and examined. denies cp sob.  no acute events. some dizziness this morning.      Vital Signs Last 24 Hrs  T(C): 36.3 (16 Dec 2018 08:36), Max: 37.2 (16 Dec 2018 04:58)  T(F): 97.3 (16 Dec 2018 08:36), Max: 98.9 (16 Dec 2018 04:58)  HR: 73 (16 Dec 2018 08:36) (67 - 89)  BP: 122/77 (16 Dec 2018 08:36) (116/61 - 136/67)  BP(mean): --  RR: 20 (16 Dec 2018 08:36) (18 - 20)  SpO2: 95% (16 Dec 2018 08:36) (93% - 97%)  I&O's Summary    15 Dec 2018 07:01  -  16 Dec 2018 07:00  --------------------------------------------------------  IN: 1022 mL / OUT: 0 mL / NET: 1022 mL        PE:  GENERAL: NAD, AAOx3  HEAD:  Atraumatic, Normocephalic  EYES: EOMI, PERRLA, conjunctiva and sclera clear  NECK: Supple, No JVD  CHEST/LUNG: CTABL, No wheeze  HEART: Regular rate and rhythm; + murmur  ABDOMEN: Soft, Nontender, Nondistended; Bowel sounds present  EXTREMITIES:  2+ Peripheral Pulses, No clubbing, cyanosis, or edema  SKIN: No rashes or lesions  NEURO: No focal deficits    LABS:            CAPILLARY BLOOD GLUCOSE                RADIOLOGY & ADDITIONAL TESTS:    Imaging Personally Reviewed:  [x] YES  [ ] NO    Consultant(s) Notes Reviewed:  [x] YES  [ ] NO    MEDICATIONS  (STANDING):  dexamethasone     Tablet 4 milliGRAM(s) Oral two times a day  ferrous    sulfate 325 milliGRAM(s) Oral daily  levETIRAcetam 500 milliGRAM(s) Oral two times a day  levothyroxine 88 MICROGram(s) Oral daily  pantoprazole  Injectable 40 milliGRAM(s) IV Push daily  verapamil  milliGRAM(s) Oral daily    MEDICATIONS  (PRN):      Care Discussed with Consultants/Other Providers [x] YES  [ ] NO    HEALTH ISSUES - PROBLEM Dx:

## 2018-12-17 ENCOUNTER — TRANSCRIPTION ENCOUNTER (OUTPATIENT)
Age: 83
End: 2018-12-17

## 2018-12-17 LAB
ANION GAP SERPL CALC-SCNC: 10 MMOL/L — SIGNIFICANT CHANGE UP (ref 5–17)
APTT BLD: 24.2 SEC — LOW (ref 27.5–36.3)
BLD GP AB SCN SERPL QL: NEGATIVE — SIGNIFICANT CHANGE UP
BUN SERPL-MCNC: 19 MG/DL — SIGNIFICANT CHANGE UP (ref 7–23)
CALCIUM SERPL-MCNC: 8.4 MG/DL — SIGNIFICANT CHANGE UP (ref 8.4–10.5)
CHLORIDE SERPL-SCNC: 106 MMOL/L — SIGNIFICANT CHANGE UP (ref 96–108)
CO2 SERPL-SCNC: 24 MMOL/L — SIGNIFICANT CHANGE UP (ref 22–31)
CREAT SERPL-MCNC: 0.73 MG/DL — SIGNIFICANT CHANGE UP (ref 0.5–1.3)
GLUCOSE SERPL-MCNC: 140 MG/DL — HIGH (ref 70–99)
HCT VFR BLD CALC: 35.9 % — SIGNIFICANT CHANGE UP (ref 34.5–45)
HGB BLD-MCNC: 11.3 G/DL — LOW (ref 11.5–15.5)
INR BLD: 0.97 RATIO — SIGNIFICANT CHANGE UP (ref 0.88–1.16)
MCHC RBC-ENTMCNC: 23.9 PG — LOW (ref 27–34)
MCHC RBC-ENTMCNC: 31.4 GM/DL — LOW (ref 32–36)
MCV RBC AUTO: 76.1 FL — LOW (ref 80–100)
PLATELET # BLD AUTO: 212 K/UL — SIGNIFICANT CHANGE UP (ref 150–400)
POTASSIUM SERPL-MCNC: 4.4 MMOL/L — SIGNIFICANT CHANGE UP (ref 3.5–5.3)
POTASSIUM SERPL-SCNC: 4.4 MMOL/L — SIGNIFICANT CHANGE UP (ref 3.5–5.3)
PROTHROM AB SERPL-ACNC: 11.1 SEC — SIGNIFICANT CHANGE UP (ref 10–12.9)
RBC # BLD: 4.72 M/UL — SIGNIFICANT CHANGE UP (ref 3.8–5.2)
RBC # FLD: 15.3 % — HIGH (ref 10.3–14.5)
RH IG SCN BLD-IMP: POSITIVE — SIGNIFICANT CHANGE UP
SODIUM SERPL-SCNC: 140 MMOL/L — SIGNIFICANT CHANGE UP (ref 135–145)
WBC # BLD: 7 K/UL — SIGNIFICANT CHANGE UP (ref 3.8–10.5)
WBC # FLD AUTO: 7 K/UL — SIGNIFICANT CHANGE UP (ref 3.8–10.5)

## 2018-12-17 PROCEDURE — 99232 SBSQ HOSP IP/OBS MODERATE 35: CPT | Mod: GC

## 2018-12-17 PROCEDURE — 99232 SBSQ HOSP IP/OBS MODERATE 35: CPT

## 2018-12-17 RX ORDER — LEVOTHYROXINE SODIUM 125 MCG
44 TABLET ORAL ONCE
Qty: 0 | Refills: 0 | Status: COMPLETED | OUTPATIENT
Start: 2018-12-17 | End: 2018-12-17

## 2018-12-17 RX ORDER — DEXAMETHASONE 0.5 MG/5ML
4 ELIXIR ORAL ONCE
Qty: 0 | Refills: 0 | Status: COMPLETED | OUTPATIENT
Start: 2018-12-17 | End: 2018-12-17

## 2018-12-17 RX ORDER — LEVETIRACETAM 250 MG/1
500 TABLET, FILM COATED ORAL ONCE
Qty: 0 | Refills: 0 | Status: COMPLETED | OUTPATIENT
Start: 2018-12-17 | End: 2018-12-17

## 2018-12-17 RX ORDER — DIPHENHYDRAMINE HCL 50 MG
25 CAPSULE ORAL EVERY 6 HOURS
Qty: 0 | Refills: 0 | Status: DISCONTINUED | OUTPATIENT
Start: 2018-12-17 | End: 2018-12-18

## 2018-12-17 RX ADMIN — Medication 325 MILLIGRAM(S): at 12:23

## 2018-12-17 RX ADMIN — PANTOPRAZOLE SODIUM 40 MILLIGRAM(S): 20 TABLET, DELAYED RELEASE ORAL at 12:24

## 2018-12-17 RX ADMIN — Medication 44 MICROGRAM(S): at 06:03

## 2018-12-17 RX ADMIN — LEVETIRACETAM 500 MILLIGRAM(S): 250 TABLET, FILM COATED ORAL at 17:27

## 2018-12-17 RX ADMIN — Medication 4 MILLIGRAM(S): at 17:27

## 2018-12-17 RX ADMIN — Medication 4 MILLIGRAM(S): at 06:03

## 2018-12-17 RX ADMIN — LEVETIRACETAM 400 MILLIGRAM(S): 250 TABLET, FILM COATED ORAL at 06:02

## 2018-12-17 NOTE — PROGRESS NOTE ADULT - SUBJECTIVE AND OBJECTIVE BOX
Patient is a 82y old  Female who presents with a chief complaint of Brain mets (17 Dec 2018 06:31)      SUBJECTIVE / OVERNIGHT EVENTS:    Patient seen and examined. denies cp sob ha dizziness.      Vital Signs Last 24 Hrs  T(C): 36.7 (17 Dec 2018 08:04), Max: 37 (17 Dec 2018 05:10)  T(F): 98.1 (17 Dec 2018 08:04), Max: 98.6 (17 Dec 2018 05:10)  HR: 71 (17 Dec 2018 08:04) (65 - 79)  BP: 152/877 (17 Dec 2018 08:04) (119/66 - 152/877)  BP(mean): --  RR: 20 (17 Dec 2018 08:04) (18 - 20)  SpO2: 95% (17 Dec 2018 08:04) (94% - 98%)  I&O's Summary    16 Dec 2018 07:01  -  17 Dec 2018 07:00  --------------------------------------------------------  IN: 1200 mL / OUT: 0 mL / NET: 1200 mL        PE:  GENERAL: NAD, AAOx3  HEAD:  Atraumatic, Normocephalic  EYES: EOMI, PERRLA, conjunctiva and sclera clear  NECK: Supple, No JVD  CHEST/LUNG: CTABL, No wheeze  HEART: Regular rate and rhythm; + murmur  ABDOMEN: Soft, Nontender, Nondistended; Bowel sounds present  EXTREMITIES:  2+ Peripheral Pulses, No clubbing, cyanosis, or edema  SKIN: No rashes or lesions  NEURO: No focal deficits    LABS:                        11.3   7.0   )-----------( 212      ( 17 Dec 2018 06:18 )             35.9     12-17    140  |  106  |  19  ----------------------------<  140<H>  4.4   |  24  |  0.73    Ca    8.4      17 Dec 2018 06:18      PT/INR - ( 17 Dec 2018 06:18 )   PT: 11.1 sec;   INR: 0.97 ratio         PTT - ( 17 Dec 2018 06:18 )  PTT:24.2 sec  CAPILLARY BLOOD GLUCOSE                RADIOLOGY & ADDITIONAL TESTS:    Imaging Personally Reviewed:  [x] YES  [ ] NO    Consultant(s) Notes Reviewed:  [x] YES  [ ] NO    MEDICATIONS  (STANDING):  dexamethasone     Tablet 4 milliGRAM(s) Oral two times a day  ferrous    sulfate 325 milliGRAM(s) Oral daily  levETIRAcetam 500 milliGRAM(s) Oral two times a day  levothyroxine 88 MICROGram(s) Oral daily  pantoprazole  Injectable 40 milliGRAM(s) IV Push daily  verapamil  milliGRAM(s) Oral daily    MEDICATIONS  (PRN):      Care Discussed with Consultants/Other Providers [x] YES  [ ] NO    HEALTH ISSUES - PROBLEM Dx:

## 2018-12-17 NOTE — PROGRESS NOTE ADULT - ASSESSMENT
82 year old female PMHx hypothyroidism, HTN (pt denies having), and RCC s/p L nephrectomy in 2007 with recurrence and lung mets, presenting with a brain mass seen on outpatient MRI. medicine consult for pre op evaluation.    # Brain mass  MRI brain shows left parietal mixed density mass with hemorrhagic fluid and heterogeneous enhancement concerning for neoplasm either primary or metastatic, with 4 mm rightward shift. left parietal extra-axial paramedian enhancing foci with calcification possibly calcified meningioma or metastasis.  appreciate neurosurgery and neurology recs  cont keppra and decadron for seizure ppx  for  CT guided peritoneal biopsy  omaya and fenestration cyst 12/18  medicine pre op eval:  no active cardiac conditions, no clinical risk factors, METS 4  intermediate risk procedure  medically optimized for OR    # RCC  sp nephrectomy, lung mets, declined treatment  CT chest abd shows bl pulm mets, L 5th rib met, mess within left nephrectomy extends to pancrease recurrent RCC, right adrenal met, peritoneal nodules likely met, gallbladder nodule met, possible bones mets as well    # hypothyroidism  cont synthroid    # HTN  normotensive at this time  cont verapamil    dvt ppx lovenox (to be held for CT guided biopsy)  plan of care dw patient    PMD Dr. Escobar [facility]

## 2018-12-17 NOTE — PROGRESS NOTE ADULT - ASSESSMENT
82 year old female with hx RCC s/p nephrectomy in 2007, known lung metastasis (not on any treatment) who lost follow up with  oncology, recently started experiencing right leg weakness, found to have hemorrhagic brain mass as well as POD on imaging.    Plan:  - CT c/a/p consistent with metastatic dx  - Obtain collateral records from [facility]  - intra peritoneal biopsy planned for likely tomorrow 12/18  - Neurosurgery following: they are planning for OR tomorrow for placement of drain   - followup SRS  - Will need a tissue proven diagnosis prior to discussing treatment options 82 year old female with hx RCC s/p nephrectomy in 2007, known lung metastasis (not on any treatment) who lost follow up with  oncology, recently started experiencing right leg weakness, found to have hemorrhagic brain mass as well as POD on imaging.    Plan:  - CT c/a/p consistent with metastatic dx  - Obtain collateral records from [facility]  - intra peritoneal biopsy planned for likely tomorrow 12/18  - Neurosurgery following: they are planning for OR tomorrow for placement of drain (to drain cyst), not an ommaya (despite notes stating this).  - followup SRS  - Will need a tissue proven diagnosis prior to discussing treatment options

## 2018-12-17 NOTE — PROGRESS NOTE ADULT - SUBJECTIVE AND OBJECTIVE BOX
INTERVAL HPI/OVERNIGHT EVENTS:  Patient S&E at bedside. No o/n events, patient resting comfortably. RUE weakness improved.  Family at bedside Pending intra peritoneal biopsy and drain placement by neurosurgery.  .    VITAL SIGNS:  T(F): 97.9 (12-17-18 @ 23:20)  HR: 79 (12-17-18 @ 23:20)  BP: 113/62 (12-17-18 @ 23:20)  RR: 18 (12-17-18 @ 23:20)  SpO2: 93% (12-17-18 @ 23:20)  Wt(kg): --    PHYSICAL EXAM:  Constitutional: NAD  Eyes: EOMI, sclera non-icteric  Neck: supple, no masses, no JVD  Respiratory: CTA b/l, good air entry b/l  Cardiovascular: RRR, no M/R/G  Gastrointestinal: soft, NTND, no masses palpable, + BS  Extremities: no c/c/e  Neurological: AAOx3; 4+/5 in RUE, sensory intact b/l      MEDICATIONS  (STANDING):  dexamethasone     Tablet 4 milliGRAM(s) Oral two times a day  ferrous    sulfate 325 milliGRAM(s) Oral daily  levETIRAcetam 500 milliGRAM(s) Oral two times a day  levothyroxine 88 MICROGram(s) Oral daily  pantoprazole  Injectable 40 milliGRAM(s) IV Push daily  verapamil  milliGRAM(s) Oral daily    MEDICATIONS  (PRN):  diphenhydrAMINE 25 milliGRAM(s) Oral every 6 hours PRN Rash and/or Itching      Allergies    No Known Allergies    Intolerances        LABS:                        11.3   7.0   )-----------( 212      ( 17 Dec 2018 06:18 )             35.9     12-17    140  |  106  |  19  ----------------------------<  140<H>  4.4   |  24  |  0.73    Ca    8.4      17 Dec 2018 06:18      PT/INR - ( 17 Dec 2018 06:18 )   PT: 11.1 sec;   INR: 0.97 ratio         PTT - ( 17 Dec 2018 06:18 )  PTT:24.2 sec      RADIOLOGY & ADDITIONAL TESTS:  Studies reviewed.    ASSESSMENT & PLAN:

## 2018-12-17 NOTE — PROGRESS NOTE ADULT - SUBJECTIVE AND OBJECTIVE BOX
Patient seen and examined at bedside.    T(C): 37 (12-17-18 @ 05:10), Max: 37 (12-17-18 @ 05:10)  HR: 68 (12-17-18 @ 05:10) (65 - 79)  BP: 129/76 (12-17-18 @ 05:10) (119/66 - 129/76)  RR: 18 (12-17-18 @ 05:10) (18 - 20)  SpO2: 97% (12-17-18 @ 05:10) (94% - 98%)  Wt(kg): --    Exam:    AOx3, FC, PERRL, EOMI, no facial   4+/5 R HF but otherwise 5/5 throughout  SILT  no clonus

## 2018-12-17 NOTE — PROGRESS NOTE ADULT - ASSESSMENT
Elvira is an 82 year old female presenting with a brain mass seen on outpatient MRI. Patient has PMH of renal cell carcinoma with mets to lungs and hypothyroidism. 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 MRI done on 12/14 shows left parietal 5 x 4 x 4.3 cm AP, TR, CC mixed density mass with hemorrhagic fluid level and heterogeneous enhancement concerning for neoplasm either primary or metastatic. 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 History of Renal cell carcinoma.     Plan:  [x] c/w Keppra 500 mg PO BID  [x] c/w Decadron 4 mg PO BID  [x] Neurosurgery for Omaya placement on 12/18  [x] IR for CT guided biopsy of an intraperitoneal nodule 12/17 Elvira is an 82 year old female presenting with a brain mass seen on outpatient MRI. Patient has PMH of renal cell carcinoma with mets to lungs and hypothyroidism. 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 MRI done on 12/14 shows left parietal 5 x 4 x 4.3 cm AP, TR, CC mixed density mass with hemorrhagic fluid level and heterogeneous enhancement concerning for neoplasm either primary or metastatic. 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    Plan:  [x] c/w Keppra 500 mg PO BID  [x] c/w Decadron 4 mg PO BID  [x] Neurosurgery for Omaya placement on 12/18  [x] IR for CT guided biopsy of an intraperitoneal nodule 12/17

## 2018-12-17 NOTE — PROGRESS NOTE ADULT - SUBJECTIVE AND OBJECTIVE BOX
*** INCOMPLETE NOTE***  Neurology progress note    Subjective:Interval History - No events overnight, for lung nodule biopsy today    Objective:   Vital Signs Last 24 Hrs  T(C): 37 (17 Dec 2018 05:10), Max: 37 (17 Dec 2018 05:10)  T(F): 98.6 (17 Dec 2018 05:10), Max: 98.6 (17 Dec 2018 05:10)  HR: 68 (17 Dec 2018 05:10) (65 - 79)  BP: 129/76 (17 Dec 2018 05:10) (119/66 - 129/76)  BP(mean): --  RR: 18 (17 Dec 2018 05:10) (18 - 20)  SpO2: 97% (17 Dec 2018 05:10) (94% - 98%)    General Exam:   General appearance: No acute distress                   Neurological Exam:  Mental Status: Orientated to self, date and place.  Attention intact.  No  dysarthria, aphasia or neglect.  Knowledge intact.  Registration intact.  Short  and long term memory grossly intact. finger agnosia right, spatial dyscalculia      Cranial Nerves: PERRL, EOMI, VFF, no nstagmus or diplopia.  CN V1-3 intact  to light touch. No facial asymmetry.  Hearing intact.  Tongue midline.   Sternocleidomastoid and Trapezius intact bilaterally.    Motor:   Tone: normal            Strength:     Upper extremity                      Delt       Bicep    Tricep     	                                          R            5/5        5/5        5/5       5/5  	                                          L              5/5        5/5        5/5       5/5  Lower extremity                       HF          KE          KF        DF         PF  	                                       R                  4/5        5/5        5/5       5/5       5/5                                            L                  5-/5        5/5       5/5       5/5        5/  Pronator drift: none                 Dysmetria: None to finger-nose-finger  No truncal ataxia.    Tremor: No resting, postural or action tremor.  No myoclonus.    Sensation: intact to light touch    Deep Tendon Reflexes: 0 bilateral biceps, triceps, brachioradialis, knee and ankle  Toes mute bilaterally    MEDICATIONS  (STANDING):  dexamethasone     Tablet 4 milliGRAM(s) Oral two times a day  ferrous    sulfate 325 milliGRAM(s) Oral daily  levETIRAcetam 500 milliGRAM(s) Oral two times a day  levothyroxine 88 MICROGram(s) Oral daily  pantoprazole  Injectable 40 milliGRAM(s) IV Push daily  verapamil  milliGRAM(s) Oral daily    MEDICATIONS  (PRN): *** INCOMPLETE NOTE***  Neurology progress note  Subjective:Interval History - No events overnight, NPO for retroperitoneal biopsy today    Objective:   Vital Signs Last 24 Hrs  T(C): 37 (17 Dec 2018 05:10), Max: 37 (17 Dec 2018 05:10)  T(F): 98.6 (17 Dec 2018 05:10), Max: 98.6 (17 Dec 2018 05:10)  HR: 68 (17 Dec 2018 05:10) (65 - 79)  BP: 129/76 (17 Dec 2018 05:10) (119/66 - 129/76)  BP(mean): --  RR: 18 (17 Dec 2018 05:10) (18 - 20)  SpO2: 97% (17 Dec 2018 05:10) (94% - 98%)    General Exam:   General appearance: No acute distress                   Neurological Exam:  Mental Status: Orientated to self, date and place.  Attention intact.  No  dysarthria, aphasia or neglect.  Knowledge intact.  Registration intact.  Short  and long term memory grossly intact. finger agnosia right, spatial dyscalculia      Cranial Nerves: PERRL, EOMI, VFF, no nstagmus or diplopia.  CN V1-3 intact  to light touch. No facial asymmetry.  Hearing intact.  Tongue midline.     Motor:   Tone: normal            Strength:     Upper extremity                      Delt       Bicep    Tricep     	                                          R            5/5        5/5        5/5       5/5  	                                          L              5/5        5/5        5/5       5/5  Lower extremity                       HF          KE          KF        DF         PF  	                                       R                  5/5        5/5        5/5       5/5       5/5                                            L                  5/5        5/5       5/5       5/5        5/5  Pronator drift: none                 Dysmetria: None to finger-nose-finger  No truncal ataxia.    Tremor: No resting, postural or action tremor.  No myoclonus.    Sensation: intact to light touch    Deep Tendon Reflexes: 0 bilateral biceps, triceps, brachioradialis, knee and ankle  Toes mute bilaterally    MEDICATIONS  (STANDING):  dexamethasone     Tablet 4 milliGRAM(s) Oral two times a day  ferrous    sulfate 325 milliGRAM(s) Oral daily  levETIRAcetam 500 milliGRAM(s) Oral two times a day  levothyroxine 88 MICROGram(s) Oral daily  pantoprazole  Injectable 40 milliGRAM(s) IV Push daily  verapamil  milliGRAM(s) Oral daily    MEDICATIONS  (PRN): Neurology progress note  Subjective:Interval History - No events overnight, NPO for retroperitoneal biopsy today    Objective:   Vital Signs Last 24 Hrs  T(C): 37 (17 Dec 2018 05:10), Max: 37 (17 Dec 2018 05:10)  T(F): 98.6 (17 Dec 2018 05:10), Max: 98.6 (17 Dec 2018 05:10)  HR: 68 (17 Dec 2018 05:10) (65 - 79)  BP: 129/76 (17 Dec 2018 05:10) (119/66 - 129/76)  BP(mean): --  RR: 18 (17 Dec 2018 05:10) (18 - 20)  SpO2: 97% (17 Dec 2018 05:10) (94% - 98%)    General Exam:   General appearance: No acute distress                   Neurological Exam:  Mental Status: Orientated to self, date and place.  Attention intact.  No  dysarthria, aphasia or neglect.  Knowledge intact.  Registration intact.  Short  and long term memory grossly intact. finger agnosia right, spatial dyscalculia      Cranial Nerves: PERRL, EOMI, VFF, no nstagmus or diplopia.  CN V1-3 intact  to light touch. No facial asymmetry.  Hearing intact.  Tongue midline.     Motor:   Tone: normal            Strength:     Upper extremity                      Delt       Bicep    Tricep     	                                          R            5/5        5/5        5/5       5/5  	                                          L              5/5        5/5        5/5       5/5  Lower extremity                       HF          KE          KF        DF         PF  	                                       R                  5/5        5/5        5/5       5/5       5/5                                            L                  5/5        5/5       5/5       5/5        5/5  Pronator drift: none                 Dysmetria: None to finger-nose-finger  No truncal ataxia.    Tremor: No resting, postural or action tremor.  No myoclonus.    Sensation: intact to light touch    Deep Tendon Reflexes: 0 bilateral biceps, triceps, brachioradialis, knee and ankle  Toes mute bilaterally    MEDICATIONS  (STANDING):  dexamethasone     Tablet 4 milliGRAM(s) Oral two times a day  ferrous    sulfate 325 milliGRAM(s) Oral daily  levETIRAcetam 500 milliGRAM(s) Oral two times a day  levothyroxine 88 MICROGram(s) Oral daily  pantoprazole  Injectable 40 milliGRAM(s) IV Push daily  verapamil  milliGRAM(s) Oral daily    MEDICATIONS  (PRN):

## 2018-12-18 ENCOUNTER — RESULT REVIEW (OUTPATIENT)
Age: 83
End: 2018-12-18

## 2018-12-18 ENCOUNTER — APPOINTMENT (OUTPATIENT)
Dept: NEUROSURGERY | Facility: HOSPITAL | Age: 83
End: 2018-12-18

## 2018-12-18 PROCEDURE — 70450 CT HEAD/BRAIN W/O DYE: CPT | Mod: 26

## 2018-12-18 PROCEDURE — 88305 TISSUE EXAM BY PATHOLOGIST: CPT | Mod: 26

## 2018-12-18 PROCEDURE — 61210 BURR HOLE IMPLT VENTR CATH: CPT

## 2018-12-18 PROCEDURE — 61781 SCAN PROC CRANIAL INTRA: CPT

## 2018-12-18 PROCEDURE — 88112 CYTOPATH CELL ENHANCE TECH: CPT | Mod: 26

## 2018-12-18 PROCEDURE — 61215 INS SUBQ RSVR PMP/NFS SYS: CPT | Mod: 59

## 2018-12-18 RX ORDER — HYDROMORPHONE HYDROCHLORIDE 2 MG/ML
0.25 INJECTION INTRAMUSCULAR; INTRAVENOUS; SUBCUTANEOUS
Qty: 0 | Refills: 0 | Status: DISCONTINUED | OUTPATIENT
Start: 2018-12-18 | End: 2018-12-18

## 2018-12-18 RX ORDER — VERAPAMIL HCL 240 MG
120 CAPSULE, EXTENDED RELEASE PELLETS 24 HR ORAL DAILY
Qty: 0 | Refills: 0 | Status: DISCONTINUED | OUTPATIENT
Start: 2018-12-18 | End: 2018-12-20

## 2018-12-18 RX ORDER — ONDANSETRON 8 MG/1
4 TABLET, FILM COATED ORAL ONCE
Qty: 0 | Refills: 0 | Status: DISCONTINUED | OUTPATIENT
Start: 2018-12-18 | End: 2018-12-18

## 2018-12-18 RX ORDER — SODIUM CHLORIDE 9 MG/ML
1000 INJECTION INTRAMUSCULAR; INTRAVENOUS; SUBCUTANEOUS
Qty: 0 | Refills: 0 | Status: DISCONTINUED | OUTPATIENT
Start: 2018-12-18 | End: 2018-12-19

## 2018-12-18 RX ORDER — ACETAMINOPHEN 500 MG
650 TABLET ORAL EVERY 6 HOURS
Qty: 0 | Refills: 0 | Status: DISCONTINUED | OUTPATIENT
Start: 2018-12-18 | End: 2018-12-20

## 2018-12-18 RX ORDER — LEVETIRACETAM 250 MG/1
500 TABLET, FILM COATED ORAL
Qty: 0 | Refills: 0 | Status: DISCONTINUED | OUTPATIENT
Start: 2018-12-18 | End: 2018-12-20

## 2018-12-18 RX ORDER — DOCUSATE SODIUM 100 MG
100 CAPSULE ORAL THREE TIMES A DAY
Qty: 0 | Refills: 0 | Status: DISCONTINUED | OUTPATIENT
Start: 2018-12-18 | End: 2018-12-20

## 2018-12-18 RX ORDER — PANTOPRAZOLE SODIUM 20 MG/1
40 TABLET, DELAYED RELEASE ORAL DAILY
Qty: 0 | Refills: 0 | Status: DISCONTINUED | OUTPATIENT
Start: 2018-12-18 | End: 2018-12-20

## 2018-12-18 RX ORDER — DEXAMETHASONE 0.5 MG/5ML
4 ELIXIR ORAL
Qty: 0 | Refills: 0 | Status: DISCONTINUED | OUTPATIENT
Start: 2018-12-18 | End: 2018-12-20

## 2018-12-18 RX ORDER — LEVOTHYROXINE SODIUM 125 MCG
88 TABLET ORAL DAILY
Qty: 0 | Refills: 0 | Status: DISCONTINUED | OUTPATIENT
Start: 2018-12-18 | End: 2018-12-20

## 2018-12-18 RX ADMIN — LEVETIRACETAM 500 MILLIGRAM(S): 250 TABLET, FILM COATED ORAL at 05:44

## 2018-12-18 RX ADMIN — Medication 88 MICROGRAM(S): at 05:44

## 2018-12-18 RX ADMIN — Medication 650 MILLIGRAM(S): at 23:38

## 2018-12-18 RX ADMIN — LEVETIRACETAM 500 MILLIGRAM(S): 250 TABLET, FILM COATED ORAL at 18:40

## 2018-12-18 RX ADMIN — SODIUM CHLORIDE 50 MILLILITER(S): 9 INJECTION INTRAMUSCULAR; INTRAVENOUS; SUBCUTANEOUS at 13:40

## 2018-12-18 RX ADMIN — Medication 4 MILLIGRAM(S): at 18:40

## 2018-12-18 RX ADMIN — Medication 100 MILLIGRAM(S): at 18:40

## 2018-12-18 RX ADMIN — Medication 4 MILLIGRAM(S): at 05:44

## 2018-12-18 RX ADMIN — Medication 120 MILLIGRAM(S): at 05:44

## 2018-12-18 NOTE — BRIEF OPERATIVE NOTE - PROCEDURE
<<-----Click on this checkbox to enter Procedure Brain surgery  12/18/2018  ommaya placement in left parietal tumor  Active  LINDA

## 2018-12-18 NOTE — PROGRESS NOTE ADULT - ASSESSMENT
82F pmhx metastatic RCC presents with focal seizures 2/2 large cystic metastasis. CT C/A/P showed extensive pulmonary metastasis and pancreatic lesions.     Plan:   - Plan for OR today

## 2018-12-18 NOTE — PROGRESS NOTE ADULT - SUBJECTIVE AND OBJECTIVE BOX
Patient seen and examined at bedside.    T(C): 36 (12-18-18 @ 11:45), Max: 37 (12-18-18 @ 04:29)  HR: 67 (12-18-18 @ 16:00) (63 - 83)  BP: 128/62 (12-18-18 @ 16:00) (105/56 - 143/61)  RR: 16 (12-18-18 @ 16:00) (16 - 18)  SpO2: 98% (12-18-18 @ 16:00) (93% - 100%)  Wt(kg): --    Exam:  AOx3, FC, PERRL, EOMI, no facial   4+/5 R HF but otherwise 5/5 throughout  SILT  no clonus

## 2018-12-18 NOTE — PROGRESS NOTE ADULT - SUBJECTIVE AND OBJECTIVE BOX
Neurology progress note    Subjective: Interval History - unable to examine patient as patient went to OR early morning.     Objective:   Vital Signs Last 24 Hrs  T(C): 37 (18 Dec 2018 08:29), Max: 37 (18 Dec 2018 04:29)  T(F): 98.6 (18 Dec 2018 04:29), Max: 98.6 (18 Dec 2018 04:29)  HR: 78 (18 Dec 2018 08:29) (68 - 79)  BP: 137/70 (18 Dec 2018 08:29) (113/62 - 157/76)  BP(mean): --  RR: 18 (18 Dec 2018 08:29) (16 - 18)  SpO2: 95% (18 Dec 2018 08:29) (93% - 96%)    General Exam:   unable to examine     MEDICATIONS  (STANDING):  dexamethasone     Tablet 4 milliGRAM(s) Oral two times a day  ferrous    sulfate 325 milliGRAM(s) Oral daily  levETIRAcetam 500 milliGRAM(s) Oral two times a day  levothyroxine 88 MICROGram(s) Oral daily  pantoprazole  Injectable 40 milliGRAM(s) IV Push daily  verapamil  milliGRAM(s) Oral daily    MEDICATIONS  (PRN):

## 2018-12-18 NOTE — PROGRESS NOTE ADULT - ASSESSMENT
Elvira is an 82 year old female presenting with a brain mass seen on outpatient MRI. Patient has PMH of renal cell carcinoma with mets to lungs and hypothyroidism. 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 MRI done on 12/14 shows left parietal 5 x 4 x 4.3 cm AP, TR, CC mixed density mass with hemorrhagic fluid level and heterogeneous enhancement concerning for neoplasm either primary or metastatic. 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    Plan:  [x] c/w Keppra 500 mg PO BID  [x] c/w Decadron 4 mg PO BID  [x] Neurosurgery for Omaya placement on 12/18  [x] IR for CT guided biopsy of an intraperitoneal nodule 12/18

## 2018-12-18 NOTE — PROGRESS NOTE ADULT - SUBJECTIVE AND OBJECTIVE BOX
Patient seen and examined at bedside.    T(C): 37 (12-18-18 @ 04:29), Max: 37 (12-18-18 @ 04:29)  HR: 78 (12-18-18 @ 04:29) (68 - 79)  BP: 137/70 (12-18-18 @ 04:29) (113/62 - 157/76)  RR: 18 (12-18-18 @ 04:29) (16 - 20)  SpO2: 95% (12-18-18 @ 04:29) (93% - 96%)  Wt(kg): --    Exam:    AOx3, FC, PERRL, EOMI, no facial   4+/5 R HF but otherwise 5/5 throughout  SILT  no clonus

## 2018-12-18 NOTE — PROGRESS NOTE ADULT - ASSESSMENT
82 yr old F s/p Ommaya placement in L parietal tumor and cyst fenestration    Plan:  - Transfer to floor  - Pain control

## 2018-12-19 ENCOUNTER — APPOINTMENT (OUTPATIENT)
Dept: CT IMAGING | Facility: HOSPITAL | Age: 83
End: 2018-12-19

## 2018-12-19 ENCOUNTER — RESULT REVIEW (OUTPATIENT)
Age: 83
End: 2018-12-19

## 2018-12-19 ENCOUNTER — APPOINTMENT (OUTPATIENT)
Dept: NEUROSURGERY | Facility: CLINIC | Age: 83
End: 2018-12-19

## 2018-12-19 PROCEDURE — 88173 CYTOPATH EVAL FNA REPORT: CPT | Mod: 26

## 2018-12-19 PROCEDURE — 76942 ECHO GUIDE FOR BIOPSY: CPT | Mod: 26

## 2018-12-19 PROCEDURE — 99232 SBSQ HOSP IP/OBS MODERATE 35: CPT

## 2018-12-19 PROCEDURE — 49180 BIOPSY ABDOMINAL MASS: CPT

## 2018-12-19 PROCEDURE — 88305 TISSUE EXAM BY PATHOLOGIST: CPT | Mod: 26

## 2018-12-19 RX ORDER — ENOXAPARIN SODIUM 100 MG/ML
40 INJECTION SUBCUTANEOUS DAILY
Qty: 0 | Refills: 0 | Status: DISCONTINUED | OUTPATIENT
Start: 2018-12-19 | End: 2018-12-20

## 2018-12-19 RX ADMIN — Medication 4 MILLIGRAM(S): at 05:01

## 2018-12-19 RX ADMIN — LEVETIRACETAM 500 MILLIGRAM(S): 250 TABLET, FILM COATED ORAL at 05:01

## 2018-12-19 RX ADMIN — SODIUM CHLORIDE 50 MILLILITER(S): 9 INJECTION INTRAMUSCULAR; INTRAVENOUS; SUBCUTANEOUS at 05:05

## 2018-12-19 RX ADMIN — Medication 120 MILLIGRAM(S): at 05:01

## 2018-12-19 RX ADMIN — Medication 88 MICROGRAM(S): at 05:01

## 2018-12-19 RX ADMIN — Medication 100 MILLIGRAM(S): at 21:24

## 2018-12-19 RX ADMIN — LEVETIRACETAM 500 MILLIGRAM(S): 250 TABLET, FILM COATED ORAL at 17:24

## 2018-12-19 RX ADMIN — Medication 650 MILLIGRAM(S): at 00:00

## 2018-12-19 RX ADMIN — ENOXAPARIN SODIUM 40 MILLIGRAM(S): 100 INJECTION SUBCUTANEOUS at 17:24

## 2018-12-19 RX ADMIN — Medication 4 MILLIGRAM(S): at 17:24

## 2018-12-19 NOTE — PROGRESS NOTE ADULT - SUBJECTIVE AND OBJECTIVE BOX
****** INCOMPLETE NOTE *****  Neurology Follow up note    Subjective:Interval History - No events overnight    Objective:   Vital Signs Last 24 Hrs  T(C): 36.8 (19 Dec 2018 04:41), Max: 37 (18 Dec 2018 08:29)  T(F): 98.2 (19 Dec 2018 04:41), Max: 98.2 (19 Dec 2018 04:41)  HR: 67 (19 Dec 2018 04:41) (63 - 83)  BP: 127/69 (19 Dec 2018 04:41) (105/56 - 143/61)  BP(mean): 89 (18 Dec 2018 16:00) (86 - 89)  RR: 18 (19 Dec 2018 04:41) (16 - 18)  SpO2: 93% (19 Dec 2018 04:41) (93% - 100%)    General Exam:   General appearance: No acute distress                 Cardiovascular: Pedal dorsalis pulses intact bilaterally    Neurological Exam:  Mental Status: Orientated to self, date and place.  Attention intact.  No dysarthria, aphasia or neglect.  Knowledge intact.  Registration intact.  Short and long term memory grossly intact.      Cranial Nerves:  PERRL, EOMI, VFF, no nystagmus or diplopia.  No APD.    CN V1-3 intact to light touch and pinprick.  No facial asymmetry.  Hearing intact to finger rub bilaterally.  Tongue, uvula and palate midline.  Sternocleidomastoid and Trapezius intact bilaterally.    Motor:   Tone: normal.                  Strength: intact throughout  Pronator drift: none                 Dysmeria: None to finger-nose-finger or heel-shin-heel  No truncal ataxia.    Tremor: No resting, postural or action tremor.  No myoclonus.    Sensation: intact to light touch, pinprick, vibration and proprioception    Deep Tendon Reflexes: 1+ bilateral biceps, triceps, brachioradialis, knee and ankle  Toes flexor bilaterally    Gait: normal and stable.      Other:    MEDICATIONS  (STANDING):  dexamethasone     Tablet 4 milliGRAM(s) Oral two times a day  docusate sodium 100 milliGRAM(s) Oral three times a day  levETIRAcetam 500 milliGRAM(s) Oral two times a day  levothyroxine 88 MICROGram(s) Oral daily  pantoprazole  Injectable 40 milliGRAM(s) IV Push daily  sodium chloride 0.9%. 1000 milliLiter(s) (50 mL/Hr) IV Continuous <Continuous>  verapamil  milliGRAM(s) Oral daily    MEDICATIONS  (PRN):  acetaminophen   Tablet .. 650 milliGRAM(s) Oral every 6 hours PRN Temp greater or equal to 38C (100.4F), Mild Pain (1 - 3) ****** INCOMPLETE NOTE *****  Neurology Follow up note    Subjective:Interval History - No events overnight, NPO for biopsy today    Objective:   Vital Signs Last 24 Hrs  T(C): 36.8 (19 Dec 2018 04:41), Max: 37 (18 Dec 2018 08:29)  T(F): 98.2 (19 Dec 2018 04:41), Max: 98.2 (19 Dec 2018 04:41)  HR: 67 (19 Dec 2018 04:41) (63 - 83)  BP: 127/69 (19 Dec 2018 04:41) (105/56 - 143/61)  BP(mean): 89 (18 Dec 2018 16:00) (86 - 89)  RR: 18 (19 Dec 2018 04:41) (16 - 18)  SpO2: 93% (19 Dec 2018 04:41) (93% - 100%)    General Exam:   General appearance: No acute distress                 Cardiovascular: Pedal dorsalis pulses intact bilaterally    Neurological Exam:  Mental Status: Orientated to self, date and place.  Attention intact.  No dysarthria, aphasia or neglect.  Knowledge intact.  Registration intact.  Short and long term memory grossly intact.      Cranial Nerves:  PERRL, EOMI, VFF, no nystagmus or diplopia.  No APD.    CN V1-3 intact to light touch and pinprick.  No facial asymmetry.  Hearing intact to finger rub bilaterally.  Tongue, uvula and palate midline.  Sternocleidomastoid and Trapezius intact bilaterally.    Motor:   Tone: normal.                  Strength: intact throughout  Pronator drift: none                 Dysmeria: None to finger-nose-finger or heel-shin-heel  No truncal ataxia.    Tremor: No resting, postural or action tremor.  No myoclonus.    Sensation: intact to light touch, pinprick, vibration and proprioception    Deep Tendon Reflexes: 1+ bilateral biceps, triceps, brachioradialis, knee and ankle  Toes flexor bilaterally    Gait: normal and stable.      Other:    MEDICATIONS  (STANDING):  dexamethasone     Tablet 4 milliGRAM(s) Oral two times a day  docusate sodium 100 milliGRAM(s) Oral three times a day  levETIRAcetam 500 milliGRAM(s) Oral two times a day  levothyroxine 88 MICROGram(s) Oral daily  pantoprazole  Injectable 40 milliGRAM(s) IV Push daily  sodium chloride 0.9%. 1000 milliLiter(s) (50 mL/Hr) IV Continuous <Continuous>  verapamil  milliGRAM(s) Oral daily    MEDICATIONS  (PRN):  acetaminophen   Tablet .. 650 milliGRAM(s) Oral every 6 hours PRN Temp greater or equal to 38C (100.4F), Mild Pain (1 - 3) Neurology Follow up note    Subjective:Interval History - No events overnight, NPO for biopsy today    Objective:   Vital Signs Last 24 Hrs  T(C): 36.8 (19 Dec 2018 04:41), Max: 37 (18 Dec 2018 08:29)  T(F): 98.2 (19 Dec 2018 04:41), Max: 98.2 (19 Dec 2018 04:41)  HR: 67 (19 Dec 2018 04:41) (63 - 83)  BP: 127/69 (19 Dec 2018 04:41) (105/56 - 143/61)  BP(mean): 89 (18 Dec 2018 16:00) (86 - 89)  RR: 18 (19 Dec 2018 04:41) (16 - 18)  SpO2: 93% (19 Dec 2018 04:41) (93% - 100%)    Neurological Exam:  Mental Status: Orientated to self, date and place.  Attention intact.  No  dysarthria, aphasia or neglect.  Knowledge intact.  Registration intact.  Short  and long term memory grossly intact. finger agnosia right, spatial dyscalculia      Cranial Nerves: PERRL, EOMI, VFF, no nstagmus or diplopia.  CN V1-3 intact  to light touch. No facial asymmetry.  Hearing intact.  Tongue midline.     Motor:   Tone: normal            Strength:     Upper extremity                      Delt       Bicep    Tricep     	                                          R            5/5        5/5        5/5       5/5  	                                          L              5/5        5/5        5/5       5/5  Lower extremity                       HF          KE          KF        DF         PF  	                                       R                  5/5        5/5        5/5       5/5       5/5                                            L                  5/5        5/5       5/5       5/5        5/5  Pronator drift: none                 Dysmetria: None to finger-nose-finger  No truncal ataxia.    Tremor: No resting, postural or action tremor.  No myoclonus.    Sensation: intact to light touch    Deep Tendon Reflexes: 0 bilateral biceps, triceps, brachioradialis, knee and ankle  Toes mute bilaterally    Other:    MEDICATIONS  (STANDING):  dexamethasone     Tablet 4 milliGRAM(s) Oral two times a day  docusate sodium 100 milliGRAM(s) Oral three times a day  levETIRAcetam 500 milliGRAM(s) Oral two times a day  levothyroxine 88 MICROGram(s) Oral daily  pantoprazole  Injectable 40 milliGRAM(s) IV Push daily  sodium chloride 0.9%. 1000 milliLiter(s) (50 mL/Hr) IV Continuous <Continuous>  verapamil  milliGRAM(s) Oral daily    MEDICATIONS  (PRN):  acetaminophen   Tablet .. 650 milliGRAM(s) Oral every 6 hours PRN Temp greater or equal to 38C (100.4F), Mild Pain (1 - 3) Neurology Follow up note    Subjective:Interval History - No events overnight, NPO for biopsy today    Objective:   Vital Signs Last 24 Hrs  T(C): 36.8 (19 Dec 2018 04:41), Max: 37 (18 Dec 2018 08:29)  T(F): 98.2 (19 Dec 2018 04:41), Max: 98.2 (19 Dec 2018 04:41)  HR: 67 (19 Dec 2018 04:41) (63 - 83)  BP: 127/69 (19 Dec 2018 04:41) (105/56 - 143/61)  BP(mean): 89 (18 Dec 2018 16:00) (86 - 89)  RR: 18 (19 Dec 2018 04:41) (16 - 18)  SpO2: 93% (19 Dec 2018 04:41) (93% - 100%)    Neurological Exam:  Mental Status: Orientated to self, date and place.  Attention intact.  No  dysarthria, aphasia or neglect.  Knowledge intact.  Registration intact.  Short  and long term memory grossly intact. dyscalculia improved     Cranial Nerves: PERRL, EOMI, VFF, no nstagmus or diplopia.  No facial asymmetry.  Hearing intact.  Tongue midline.     Motor:   Tone: normal            Strength:     Upper extremity                      Delt       Bicep    Tricep     	                                          R            5/5        5/5        5/5       5/5  	                                          L              5/5        5/5        5/5       5/5  Lower extremity                       HF          KE          KF        DF         PF  	                                       R                  5/5        5/5        5/5       5/5       5/5                                            L                  5/5        5/5       5/5       5/5        5/5  Pronator drift: none                 Dysmetria: None to finger-nose-finger  No truncal ataxia.    Tremor: No resting, postural or action tremor.  No myoclonus.    Sensation: intact to light touch    Deep Tendon Reflexes: 0 bilateral biceps, triceps, brachioradialis, knee and ankle  Toes mute bilaterally    Other:    MEDICATIONS  (STANDING):  dexamethasone     Tablet 4 milliGRAM(s) Oral two times a day  docusate sodium 100 milliGRAM(s) Oral three times a day  levETIRAcetam 500 milliGRAM(s) Oral two times a day  levothyroxine 88 MICROGram(s) Oral daily  pantoprazole  Injectable 40 milliGRAM(s) IV Push daily  sodium chloride 0.9%. 1000 milliLiter(s) (50 mL/Hr) IV Continuous <Continuous>  verapamil  milliGRAM(s) Oral daily    MEDICATIONS  (PRN):  acetaminophen   Tablet .. 650 milliGRAM(s) Oral every 6 hours PRN Temp greater or equal to 38C (100.4F), Mild Pain (1 - 3)

## 2018-12-19 NOTE — DIETITIAN INITIAL EVALUATION ADULT. - ENERGY NEEDS
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    No noted edema as per flow sheets. Skin: surgical incision; no noted pressure injuries as per documentation.

## 2018-12-19 NOTE — PROGRESS NOTE ADULT - ASSESSMENT
**** INCOMPLETE NOTE ****  Patient is an 82 year old female presenting with a brain mass seen on outpatient MRI. Patient has PMH of renal cell carcinoma with mets to lungs and hypothyroidism. 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 MRI done on 12/14 shows left parietal 5 x 4 x 4.3 cm AP, TR, CC mixed density mass with hemorrhagic fluid level and heterogeneous enhancement concerning for neoplasm either primary or metastatic. 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    Plan:  [x] c/w Keppra 500 mg PO BID  [x] c/w Decadron 4 mg PO BID  [x] s/p Omaya placement and CT guided bx of intraperitoneal nodule on 12/18 **** INCOMPLETE NOTE ****  Patient is an 82 year old female presenting with a brain mass seen on outpatient MRI. Patient has PMH of renal cell carcinoma with mets to lungs and hypothyroidism. 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 MRI done on 12/14 shows left parietal 5 x 4 x 4.3 cm AP, TR, CC mixed density mass with hemorrhagic fluid level and heterogeneous enhancement concerning for neoplasm either primary or metastatic. 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    Plan:  [x] c/w Keppra 500 mg PO BID  [x] c/w Decadron 4 mg PO BID  [x] s/p Omaya placement on 12/18   [x] CT guided bx to be done for intraperitoneal nodule on 12/19 Patient is an 82 year old female presenting with a brain mass seen on outpatient MRI. Patient has PMH of renal cell carcinoma with mets to lungs and hypothyroidism. 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 MRI done on 12/14 shows left parietal 5 x 4 x 4.3 cm AP, TR, CC mixed density mass with hemorrhagic fluid level and heterogeneous enhancement concerning for neoplasm either primary or metastatic. 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    Plan:  [x] c/w Keppra 500 mg PO BID  [x] c/w Decadron 4 mg PO BID  [x] s/p Omaya placement on 12/18   [x] CT guided bx to be done for intraperitoneal nodule on 12/19 Patient is an 82 year old female presenting with a brain mass seen on outpatient MRI. Patient has PMH of renal cell carcinoma with mets to lungs and hypothyroidism. 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 MRI done on 12/14 shows left parietal 5 x 4 x 4.3 cm AP, TR, CC mixed density mass with hemorrhagic fluid level and heterogeneous enhancement concerning for neoplasm either primary or metastatic. CT C/A/P showed diffuse pulmonary mets, multple larger heterogenous pancreatic masses, s/p left nephrectomy, hiatal hernia    Impression:   Resolving partial gerstmann syndrome and mild right foot weakness (now resolved) 2/2 L parietal brain lesion with edema into the precentral gyrus likely due to metastatic lesion, especially in setting of hx of renal cell carcinoma with widespread mets.    Plan:  [x] c/w Keppra 500 mg PO BID  [x] c/w Decadron 4 mg PO BID  [x] s/p Omaya placement on 12/18   [x] CT guided bx to be done for intraperitoneal nodule on 12/19

## 2018-12-19 NOTE — DIETITIAN INITIAL EVALUATION ADULT. - OTHER INFO
Pt seen for length of stay initial assessment. Pt reports improved appetite and PO intake, states consumed >75% of lunch today. Denies difficulty chewing/swallowing. Pt denies nausea, vomiting, diarrhea, or constipation, reports last BM today (12/19).

## 2018-12-19 NOTE — DIETITIAN INITIAL EVALUATION ADULT. - ORAL INTAKE PTA
Pt reports decreased appetite and PO intake with attempts to lose weight since 2 years PTA. Confirms NKFA.

## 2018-12-19 NOTE — PROGRESS NOTE ADULT - ASSESSMENT
82F pmhx metastatic RCC presents with focal seizures 2/2 large cystic metastasis. CT C/A/P showed extensive pulmonary metastasis and pancreatic lesions. Patient is day 1 from cyst fenestration and placement of reservoir.     Plan:   - continue management per primary team   - Will continue to monitor patient.

## 2018-12-19 NOTE — DIETITIAN INITIAL EVALUATION ADULT. - NS AS NUTRI INTERV ED CONTENT
Stressed the importance of protein intake to help with healing and prevent muscle/weight loss, provided recommendations to increase PO and protein intake, recommended small frequent meals with protein in case of decreased appetite, suggested pt not to focus on weight loss at this time but on recovery and protein intake. Offered nutritional supplementation available in hospital - pt refused at this time, states "I am doing good with food".

## 2018-12-19 NOTE — PROGRESS NOTE ADULT - SUBJECTIVE AND OBJECTIVE BOX
82 year old female with PMHX of RCC s/p nephrectomy with lung mass in 2007 admitted with brain mets seen on MRI s/p brain biopsy on 10/18. Pt noted to have metastatic disease on CT scan presented to IR for abdominal mass biopsy.     NPO status:   Anticoagulation: Lovenox on 12/15.       Allergies: No Known Allergies      PAST MEDICAL & SURGICAL HISTORY:  Essential (primary) hypertension  Hypothyroidism, unspecified type  Renal cancer  History of nephrectomy: Left nephrectomy 2007    Pertinent labs:  Complete Blood Count (12.17.18 @ 06:18)    WBC Count: 7.0 K/uL    RBC Count: 4.72 M/uL    Hemoglobin: 11.3 g/dL    Hematocrit: 35.9 %    Mean Cell Volume: 76.1 fl    Mean Cell Hemoglobin: 23.9 pg    Mean Cell Hemoglobin Conc: 31.4 gm/dL    Red Cell Distrib Width: 15.3 %    Platelet Count - Automated: 212 K/uL    Comprehensive Metabolic Panel (12.14.18 @ 06:09)    Sodium, Serum: 139 mmol/L    Potassium, Serum: 4.5 mmol/L    Chloride, Serum: 105 mmol/L    Carbon Dioxide, Serum: 20 mmol/L    Anion Gap, Serum: 14 mmol/L    Blood Urea Nitrogen, Serum: 13 mg/dL    Creatinine, Serum: 0.71 mg/dL    Glucose, Serum: 144 mg/dL    Calcium, Total Serum: 8.7 mg/dL    Protein Total, Serum: 6.5 g/dL    Albumin, Serum: 3.5 g/dL    Bilirubin Total, Serum: 0.3 mg/dL    Alkaline Phosphatase, Serum: 82 U/L    Aspartate Aminotransferase (AST/SGOT): 15 U/L    Alanine Aminotransferase (ALT/SGPT): 19 U/L    eGFR if Non : 79: Interpretative comment  The units for eGFR are ml/min/1.73m2 (normalized body surface area). The  eGFR is calculated from a serum creatinine using the CKD-EPI equation.  Other variables required for calculation are race, age and sex. Among  patients with chronic kidney disease (CKD), the eGFR is useful in  determining the stage of disease according to KDOQI CKD classification.  All eGFR results are reported numerically with the following  interpretation.          GFR                    With                 Without     (ml/min/1.73 m2)    Kidney Damage       Kidney Damage        >= 90                    Stage 1                     Normal        60-89                    Stage 2                     Decreased GFR        30-59     Stage 3                     Stage 3        15-29                    Stage 4                     Stage 4        < 15                      Stage 5                     Stage 5  Each stage of CKD assumes that the associated GFR level has been in  effect for at least 3 months. Determination of stages one and two (with  eGFR > 59 ml/min/m2) requires estimation of kidney damage for at least 3  months as defined by structural or functional abnormalities.  Limitations: All estimates of GFR will be less accurate for patients at  extremes of muscle mass (including but not limited to frail elderly,  critically ill, or cancer patients), those with unusual diets, and those  with conditions associated with reduced secretion or extrarenal  elimination of creatinine. The eGFR equation is not recommended for use  in patients with unstable creatinine levels. mL/min/1.73M2    eGFR if African American: 92 mL/min/1.73M2    Activated Partial Thromboplastin Time in AM (12.17.18 @ 06:18)    Activated Partial Thromboplastin Time: 24.2: The recommended therapeutic heparin range (full dose) is 58-99 seconds.  Recommended therapeutic Argatroban range is 1.5 to 3.0 times the baseline  APTT value, not to exceed 100 seconds. Recommended therapeutic Refludan  range is 1.5 to 2.5 times thebaseline APTT.  Effective October 30, 2018 the reference range has changed. sec    Prothrombin Time and INR, Plasma in AM (12.17.18 @ 06:18)    Prothrombin Time, Plasma: 11.1: Effective October 30th, 2018 the reference range for PT has changed. sec    INR: 0.97: RECOMMENDED RANGES FOR THERAPEUTIC INR:    2.0-3.0 for most medical and surgical thromboembolic states    2.0-3.0 for atrial fibrillation    2.0-3.0 for bileaflet mechanical valve in aortic position    2.5-3.5 for mechanical heart valves   Chest 2004;126:[id number]  The presence of direct thrombin inhibitors (argatroban, refludan)  may falsely increase results. ratio          Consent: Procedure/risks/ Benefits explained. Informed consent obtained. Pt verbalizes understanding. 82 year old female with PMHX of RCC s/p nephrectomy with lung mass in 2007 admitted with brain mets seen on MRI s/p ommaya placement in left parietal tumor on 10/18. Pt noted to have metastatic disease on CT scan presented to IR for abdominal mass biopsy.     NPO status:   Anticoagulation: Lovenox on 12/15.       Allergies: No Known Allergies      PAST MEDICAL & SURGICAL HISTORY:  Essential (primary) hypertension  Hypothyroidism, unspecified type  Renal cancer  History of nephrectomy: Left nephrectomy 2007    Pertinent labs:  Complete Blood Count (12.17.18 @ 06:18)    WBC Count: 7.0 K/uL    RBC Count: 4.72 M/uL    Hemoglobin: 11.3 g/dL    Hematocrit: 35.9 %    Mean Cell Volume: 76.1 fl    Mean Cell Hemoglobin: 23.9 pg    Mean Cell Hemoglobin Conc: 31.4 gm/dL    Red Cell Distrib Width: 15.3 %    Platelet Count - Automated: 212 K/uL    Comprehensive Metabolic Panel (12.14.18 @ 06:09)    Sodium, Serum: 139 mmol/L    Potassium, Serum: 4.5 mmol/L    Chloride, Serum: 105 mmol/L    Carbon Dioxide, Serum: 20 mmol/L    Anion Gap, Serum: 14 mmol/L    Blood Urea Nitrogen, Serum: 13 mg/dL    Creatinine, Serum: 0.71 mg/dL    Glucose, Serum: 144 mg/dL    Calcium, Total Serum: 8.7 mg/dL    Protein Total, Serum: 6.5 g/dL    Albumin, Serum: 3.5 g/dL    Bilirubin Total, Serum: 0.3 mg/dL    Alkaline Phosphatase, Serum: 82 U/L    Aspartate Aminotransferase (AST/SGOT): 15 U/L    Alanine Aminotransferase (ALT/SGPT): 19 U/L    eGFR if Non : 79: Interpretative comment  The units for eGFR are ml/min/1.73m2 (normalized body surface area). The  eGFR is calculated from a serum creatinine using the CKD-EPI equation.  Other variables required for calculation are race, age and sex. Among  patients with chronic kidney disease (CKD), the eGFR is useful in  determining the stage of disease according to KDOQI CKD classification.  All eGFR results are reported numerically with the following  interpretation.          GFR                    With                 Without     (ml/min/1.73 m2)    Kidney Damage       Kidney Damage        >= 90                    Stage 1                     Normal        60-89                    Stage 2                     Decreased GFR        30-59     Stage 3                     Stage 3        15-29                    Stage 4                     Stage 4        < 15                      Stage 5                     Stage 5  Each stage of CKD assumes that the associated GFR level has been in  effect for at least 3 months. Determination of stages one and two (with  eGFR > 59 ml/min/m2) requires estimation of kidney damage for at least 3  months as defined by structural or functional abnormalities.  Limitations: All estimates of GFR will be less accurate for patients at  extremes of muscle mass (including but not limited to frail elderly,  critically ill, or cancer patients), those with unusual diets, and those  with conditions associated with reduced secretion or extrarenal  elimination of creatinine. The eGFR equation is not recommended for use  in patients with unstable creatinine levels. mL/min/1.73M2    eGFR if African American: 92 mL/min/1.73M2    Activated Partial Thromboplastin Time in AM (12.17.18 @ 06:18)    Activated Partial Thromboplastin Time: 24.2: The recommended therapeutic heparin range (full dose) is 58-99 seconds.  Recommended therapeutic Argatroban range is 1.5 to 3.0 times the baseline  APTT value, not to exceed 100 seconds. Recommended therapeutic Refludan  range is 1.5 to 2.5 times thebaseline APTT.  Effective October 30, 2018 the reference range has changed. sec    Prothrombin Time and INR, Plasma in AM (12.17.18 @ 06:18)    Prothrombin Time, Plasma: 11.1: Effective October 30th, 2018 the reference range for PT has changed. sec    INR: 0.97: RECOMMENDED RANGES FOR THERAPEUTIC INR:    2.0-3.0 for most medical and surgical thromboembolic states    2.0-3.0 for atrial fibrillation    2.0-3.0 for bileaflet mechanical valve in aortic position    2.5-3.5 for mechanical heart valves   Chest 2004;126:[id number]  The presence of direct thrombin inhibitors (argatroban, refludan)  may falsely increase results. ratio          Consent: Procedure/risks/ Benefits explained. Informed consent obtained. Pt verbalizes understanding.

## 2018-12-19 NOTE — DIETITIAN INITIAL EVALUATION ADULT. - ADHERENCE
Pt reports following a diet from [employer] to lose weight PTA. Pt reports not taking any vitamins or nutritional supplements PTA.

## 2018-12-19 NOTE — DIETITIAN INITIAL EVALUATION ADULT. - NS FNS WEIGHT CHANGE REASON
Pt reports intentional weight loss of 25-35 pounds x 2 years PTA, from 170 to 143 pounds with [employer]. Weight as per flow sheets (12/18) 145 pounds.

## 2018-12-19 NOTE — PROGRESS NOTE ADULT - SUBJECTIVE AND OBJECTIVE BOX
Interventional Radiology Brief- Operative Note    Operators: Afshin Mcguire MD    Procedure: US guided right lower quadrant lesion biopsy    Post-op Dx: history of RCC with multiple lesions suspicious for metastatic disease    EBL: 7 mL    Medications: 1% lidocaine, MAC     Contrast: none    Complications: no immediate complications    Findings/Plan: Successful US guided biopsy with multiple cores obtained

## 2018-12-19 NOTE — PROGRESS NOTE ADULT - SUBJECTIVE AND OBJECTIVE BOX
Patient seen and examined at bedside.    T(C): 36.8 (12-19-18 @ 04:41), Max: 37 (12-18-18 @ 08:29)  HR: 67 (12-19-18 @ 04:41) (63 - 83)  BP: 127/69 (12-19-18 @ 04:41) (105/56 - 143/61)  RR: 18 (12-19-18 @ 04:41) (16 - 18)  SpO2: 93% (12-19-18 @ 04:41) (93% - 100%)  Wt(kg): --    Exam:  AOx3, FC, PERRL, EOMI, no facial   4+/5 R HF but otherwise 5/5 throughout  SILT  no clonus  Incision: c/d/i

## 2018-12-20 VITALS
OXYGEN SATURATION: 96 % | DIASTOLIC BLOOD PRESSURE: 70 MMHG | RESPIRATION RATE: 15 BRPM | TEMPERATURE: 98 F | HEART RATE: 74 BPM | SYSTOLIC BLOOD PRESSURE: 124 MMHG

## 2018-12-20 LAB — NON-GYNECOLOGICAL CYTOLOGY STUDY: SIGNIFICANT CHANGE UP

## 2018-12-20 PROCEDURE — 86901 BLOOD TYPING SEROLOGIC RH(D): CPT

## 2018-12-20 PROCEDURE — 72149 MRI LUMBAR SPINE W/DYE: CPT

## 2018-12-20 PROCEDURE — 96375 TX/PRO/DX INJ NEW DRUG ADDON: CPT

## 2018-12-20 PROCEDURE — 86900 BLOOD TYPING SEROLOGIC ABO: CPT

## 2018-12-20 PROCEDURE — 87086 URINE CULTURE/COLONY COUNT: CPT

## 2018-12-20 PROCEDURE — 88172 CYTP DX EVAL FNA 1ST EA SITE: CPT

## 2018-12-20 PROCEDURE — 70450 CT HEAD/BRAIN W/O DYE: CPT

## 2018-12-20 PROCEDURE — 88305 TISSUE EXAM BY PATHOLOGIST: CPT

## 2018-12-20 PROCEDURE — 72147 MRI CHEST SPINE W/DYE: CPT

## 2018-12-20 PROCEDURE — 80053 COMPREHEN METABOLIC PANEL: CPT

## 2018-12-20 PROCEDURE — 99232 SBSQ HOSP IP/OBS MODERATE 35: CPT | Mod: GC

## 2018-12-20 PROCEDURE — 49180 BIOPSY ABDOMINAL MASS: CPT

## 2018-12-20 PROCEDURE — 80048 BASIC METABOLIC PNL TOTAL CA: CPT

## 2018-12-20 PROCEDURE — 74177 CT ABD & PELVIS W/CONTRAST: CPT

## 2018-12-20 PROCEDURE — 70553 MRI BRAIN STEM W/O & W/DYE: CPT

## 2018-12-20 PROCEDURE — 85730 THROMBOPLASTIN TIME PARTIAL: CPT

## 2018-12-20 PROCEDURE — A9585: CPT

## 2018-12-20 PROCEDURE — 71260 CT THORAX DX C+: CPT

## 2018-12-20 PROCEDURE — 85027 COMPLETE CBC AUTOMATED: CPT

## 2018-12-20 PROCEDURE — 72142 MRI NECK SPINE W/DYE: CPT

## 2018-12-20 PROCEDURE — 99231 SBSQ HOSP IP/OBS SF/LOW 25: CPT

## 2018-12-20 PROCEDURE — 99232 SBSQ HOSP IP/OBS MODERATE 35: CPT

## 2018-12-20 PROCEDURE — 76942 ECHO GUIDE FOR BIOPSY: CPT

## 2018-12-20 PROCEDURE — 85610 PROTHROMBIN TIME: CPT

## 2018-12-20 PROCEDURE — 88173 CYTOPATH EVAL FNA REPORT: CPT

## 2018-12-20 PROCEDURE — 86850 RBC ANTIBODY SCREEN: CPT

## 2018-12-20 PROCEDURE — 88112 CYTOPATH CELL ENHANCE TECH: CPT

## 2018-12-20 PROCEDURE — 96374 THER/PROPH/DIAG INJ IV PUSH: CPT

## 2018-12-20 PROCEDURE — 81001 URINALYSIS AUTO W/SCOPE: CPT

## 2018-12-20 PROCEDURE — 99285 EMERGENCY DEPT VISIT HI MDM: CPT | Mod: 25

## 2018-12-20 PROCEDURE — C1889: CPT

## 2018-12-20 PROCEDURE — 97161 PT EVAL LOW COMPLEX 20 MIN: CPT

## 2018-12-20 PROCEDURE — 93005 ELECTROCARDIOGRAM TRACING: CPT

## 2018-12-20 RX ORDER — LEVETIRACETAM 250 MG/1
1 TABLET, FILM COATED ORAL
Qty: 0 | Refills: 0 | COMMUNITY
Start: 2018-12-20

## 2018-12-20 RX ORDER — PANTOPRAZOLE SODIUM 20 MG/1
1 TABLET, DELAYED RELEASE ORAL
Qty: 60 | Refills: 0
Start: 2018-12-20 | End: 2019-02-17

## 2018-12-20 RX ORDER — VERAPAMIL HCL 240 MG
1 CAPSULE, EXTENDED RELEASE PELLETS 24 HR ORAL
Qty: 0 | Refills: 0 | COMMUNITY

## 2018-12-20 RX ORDER — LEVOTHYROXINE SODIUM 125 MCG
1 TABLET ORAL
Qty: 0 | Refills: 0 | COMMUNITY

## 2018-12-20 RX ORDER — PANTOPRAZOLE SODIUM 20 MG/1
1 TABLET, DELAYED RELEASE ORAL
Qty: 60 | Refills: 0 | OUTPATIENT
Start: 2018-12-20 | End: 2019-02-17

## 2018-12-20 RX ORDER — DEXAMETHASONE 0.5 MG/5ML
1 ELIXIR ORAL
Qty: 60 | Refills: 0 | OUTPATIENT
Start: 2018-12-20 | End: 2019-01-18

## 2018-12-20 RX ADMIN — Medication 100 MILLIGRAM(S): at 12:32

## 2018-12-20 RX ADMIN — Medication 88 MICROGRAM(S): at 05:37

## 2018-12-20 RX ADMIN — LEVETIRACETAM 500 MILLIGRAM(S): 250 TABLET, FILM COATED ORAL at 05:37

## 2018-12-20 RX ADMIN — Medication 120 MILLIGRAM(S): at 05:37

## 2018-12-20 RX ADMIN — Medication 100 MILLIGRAM(S): at 05:37

## 2018-12-20 RX ADMIN — Medication 4 MILLIGRAM(S): at 05:37

## 2018-12-20 RX ADMIN — PANTOPRAZOLE SODIUM 40 MILLIGRAM(S): 20 TABLET, DELAYED RELEASE ORAL at 12:32

## 2018-12-20 NOTE — DISCHARGE NOTE ADULT - MEDICATION SUMMARY - MEDICATIONS TO STOP TAKING
I will STOP taking the medications listed below when I get home from the hospital:    ferrous sulfate 325 mg (65 mg elemental iron) oral tablet  -- 1 tab(s) by mouth once a day   -- Check with your doctor before becoming pregnant.  Do not chew, break, or crush.  May discolor urine or feces.

## 2018-12-20 NOTE — PHYSICAL THERAPY INITIAL EVALUATION ADULT - PRECAUTIONS/LIMITATIONS, REHAB EVAL
fall precautions/no known precautions/limitations/CT C/A/P showed diffuse pulmonary mets, multple larger heterogenous pancreatic masses, s/p left nephrectomy, hiatal hernia. CT head After left parietal hilda hole placement a drain was placed in the left parietal cystic neoplasm. The residual neoplasm is significantly smaller compared with the prior exam 12/13/2018. A new small amount of hemorrhage layers within the cyst. Pt now neurologically safe for D/C.

## 2018-12-20 NOTE — DISCHARGE NOTE ADULT - PLAN OF CARE
to follow up as outpatient for treatment of mass follow up with Dr Parra as outpatient (neurosurgery)  follow up at [facility]   follow up with Dr Helm for neuro-onc care  continue decadron 2 mg twice daily  continue PPI 40 mg  continue keppra 500 BID  continue synthorid 88, verapamil 120

## 2018-12-20 NOTE — DISCHARGE NOTE ADULT - HOSPITAL COURSE
Patient is an 82 year old female presenting with a brain mass seen on outpatient MRI. Patient has PMH of renal cell carcinoma with mets to lungs and hypothyroidism. 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 MRI done on 12/14 shows left parietal 5 x 4 x 4.3 cm AP, TR, CC mixed density mass with hemorrhagic fluid level and heterogeneous enhancement concerning for neoplasm either primary or metastatic. CT C/A/P showed diffuse pulmonary mets, multple larger heterogenous pancreatic masses, s/p left nephrectomy, hiatal hernia    Impression: Resolving partial gerstmann syndrome and mild right foot weakness (now resolved) 2/2 L parietal brain lesion with edema into the precentral gyrus likely due to metastatic lesion, especially in setting of hx of renal cell carcinoma with widespread mets.  During hospital course Omaya was placed on 12/18, intraperitoneal bx was done on 12/19. She was on Decadron 4 mg BID, will be discharged on 2 mg BID. She is on keppra 500 BID and will continue it at home. She is able to walk, is more comfortable with a walker. She was cleared by neurology and neurosurgery for discharge.    Follow up with Dr Helm and Dr Thayer (phone numbers below)  Additionally, please follow up at the [facility] for renal cell Carcinoma. : [address] [place] [address] A, [place] [address] </[id number]&q=[facility]&name=[facility]&cp=[phone]%7e-[phone]&ppois=[phone]_-[phone]_[facility]&FORM=SNAPST> Phone: [phone] Patient is an 82 year old female presenting with a brain mass seen on outpatient MRI. Patient has PMH of renal cell carcinoma with mets to lungs and hypothyroidism. 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 MRI done on 12/14 shows left parietal 5 x 4 x 4.3 cm AP, TR, CC mixed density mass with hemorrhagic fluid level and heterogeneous enhancement concerning for neoplasm either primary or metastatic. CT C/A/P showed diffuse pulmonary mets, multple larger heterogenous pancreatic masses, s/p left nephrectomy, hiatal hernia    Impression: Resolving partial gerstmann syndrome and mild right foot weakness (now resolved) 2/2 L parietal brain lesion with edema into the precentral gyrus likely due to metastatic lesion, especially in setting of hx of renal cell carcinoma with widespread mets.  During hospital course Omaya was placed on 12/18, intraperitoneal bx was done on 12/19. She was on Decadron 4 mg BID, will be discharged on 2 mg BID. She is on keppra 500 BID and will continue it at home. She is able to walk, is more comfortable with a walker. She was cleared by neurology and neurosurgery for discharge.    Follow up with Dr Helm and Dr Thayer (phone numbers below)  Additionally, please follow up at the [facility] for renal cell Carcinoma. : [address] [place] [address] A, [place] [address] </local?[id number]&q=[facility]&name=[facility]&cp=[phone]%7e-[phone]&ppois=[phone]_-[phone]_[facility]&FORM=SNAPST> Phone: [phone]

## 2018-12-20 NOTE — PROGRESS NOTE ADULT - SUBJECTIVE AND OBJECTIVE BOX
Neurology Follow up note    Patient is a 82y old  Female who presents with a chief complaint of Brain mets (20 Dec 2018 05:42)    Subjective:Interval History - No events overnight, Bx tolerated well yesterday, strength improved on RLE    Objective:   Vital Signs Last 24 Hrs  T(C): 36.6 (20 Dec 2018 07:54), Max: 37.2 (19 Dec 2018 23:42)  T(F): 97.8 (20 Dec 2018 07:54), Max: 99 (19 Dec 2018 23:42)  HR: 69 (20 Dec 2018 07:54) (61 - 80)  BP: 94/54 (20 Dec 2018 07:54) (94/54 - 134/69)  BP(mean): --  RR: 18 (20 Dec 2018 07:54) (18 - 18)  SpO2: 95% (20 Dec 2018 07:54) (94% - 95%)    General Exam:   General appearance: No acute distress                 Cardiovascular: Pedal dorsalis pulses intact bilaterally    Neurological Exam:  Mental Status: Orientated to self, date and place.  Attention intact.  No  dysarthria, aphasia or neglect.  Knowledge intact.  Registration intact.  Short  and long term memory grossly intact. dyscalculia improved     Cranial Nerves: PERRL, EOMI, VFF, no nystagmus or diplopia.  No facial asymmetry.  Hearing intact.  Tongue midline.     Motor:   Tone: normal            Strength:     Upper extremity                      Delt       Bicep    Tricep     	                                          R            5/5        5/5        5/5       5/5  	                                          L              5/5        5/5        5/5       5/5  Lower extremity                       HF          KE          KF        DF         PF  	                                       R                  5/5        5/5        5/5       5/5       5/5                                            L                  5/5        5/5       5/5       5/5        5/5  Pronator drift: none                 Dysmetria: None to finger-nose-finger  No truncal ataxia.    Tremor: No resting, postural or action tremor.  No myoclonus.    Sensation: intact to light touch    Deep Tendon Reflexes: 0 bilateral biceps, triceps, brachioradialis, knee and ankle  Toes mute bilaterally    MEDICATIONS  (STANDING):  dexamethasone     Tablet 4 milliGRAM(s) Oral two times a day  docusate sodium 100 milliGRAM(s) Oral three times a day  enoxaparin Injectable 40 milliGRAM(s) SubCutaneous daily  levETIRAcetam 500 milliGRAM(s) Oral two times a day  levothyroxine 88 MICROGram(s) Oral daily  pantoprazole  Injectable 40 milliGRAM(s) IV Push daily  verapamil  milliGRAM(s) Oral daily    MEDICATIONS  (PRN):  acetaminophen   Tablet .. 650 milliGRAM(s) Oral every 6 hours PRN Temp greater or equal to 38C (100.4F), Mild Pain (1 - 3)

## 2018-12-20 NOTE — DISCHARGE NOTE ADULT - CARE PROVIDER_API CALL
Kevin Thayer), Neurological Surgery  [address] [place] [address]  Phone: [phone]  Fax: [phone]    Hope Helm), Neurology  [facility]  [address] [place] [address]  Phone: [phone]  Fax: [phone]

## 2018-12-20 NOTE — PROGRESS NOTE ADULT - ATTENDING COMMENTS
Thank you for this consult. [facility] [phone].
Thank you for this consult. [facility] [phone].
Thank you for this consult. [facility] [phone].
Agree with above.   Bx c/w clear cell RCC. [facility] d/c. [phone].
I performed a history and physical examination of the patient and discussed the management of the patient with the resident. I reviewed the resident's note and agree with the documented findings and plan of care with the following additions/exceptions.    ct c/a/p showing widely metastatic disease  will d/w neurosurgery if the yield of the cyst fenestration is not expected to be great then will ask IR to do biopsy of potentially the perintoneal nodule    d/w patient and her daughter who was on speakerphone. patient expressed not wanting to be receiving any toxic treatment or to be sick in the hospital, which she witnessed with her  who is  now and caring for him was the reason she started not managing her own metastatic cancer. she was aware before that it was "microscopic in the lung" and also involving the pancreas. she is interested in getting a better sense of her prognosis without treatment as well as knowing if a less toxic treatment could be available so she can maintain her quality of life.
I performed a history and physical examination of the patient and discussed the management of the patient with the resident. I reviewed the resident's note and agree with the documented findings and plan of care with the following additions/exceptions.    remains feeling well with right leg still stronger than admission, felt nearly full strength with DF    asking if she will be able to drive after discharge (right leg weakness prohibiting driving in the last month). counseled that will need to see how she does after the cyst fenestration. consider OT eval and outpatient driving eval to help answer that question.
Patient seen and examined - doing well - no complaints.  Cytology from cyst drainage negative for malignant cells.  Peritoneal biopsy results pending.  She is neurologically safe for discharge - send out on decadron 2 mg bid with GI prophylaxis.  Follow up with myself and Dr. Thayer in about 10 days.  Follow up with medical oncology.
Agree with above. Pt evaluated at bedside. Imaging and laboratory studies reviewed.   Pt with metastatic disease, suspect met RCC. Pending placement of reservoir to prevent recurrence of intracranial cyst.   Pending biopsy of peritoneal nodule.   Remains largely asymptomatic  Will f/u on path   Can f/u with us at [facility] upon d/c
Patient seen and examined - for retroperitoneal biopsy today and neurosurgery tomorrow. Clinically stable.
see my addendum to H&P
Patient POD 1 cystic fenestration and reservoir placement - likely radiosurgery in future. Peritoneal biopsy today. Neurologically doing well.
29.8

## 2018-12-20 NOTE — PROGRESS NOTE ADULT - SUBJECTIVE AND OBJECTIVE BOX
Interventional Radiology Follow- Up Note      82y Female s/p us guided biopsy of right lower lesion on 12/19/18 in Interventional Radiology with Dr Vásquez.     Patient seen and examined @ bedside. Site c/d/i with dsg intact. Pt currently awaiting to be discharged.    No complaints offered.    Vitals: T(F): 97.7 (12-20-18 @ 15:43), Max: 99 (12-19-18 @ 23:42)  HR: 74 (12-20-18 @ 15:43) (69 - 80)  BP: 124/70 (12-20-18 @ 15:43) (94/54 - 124/70)  RR: 15 (12-20-18 @ 15:43) (15 - 18)  SpO2: 96% (12-20-18 @ 15:43) (95% - 96%)  Wt(kg): --    LABS:      I&O's Detail    20 Dec 2018 07:01  -  20 Dec 2018 16:20  --------------------------------------------------------  IN:    Oral Fluid: 840 mL  Total IN: 840 mL    OUT:  Total OUT: 0 mL    Total NET: 840 mL      PHYSICAL EXAM:  General: Nontoxic, in NAD  Neuro:  Alert & oriented x 3  Lung: respirations nonlabored, good inspiratory effort  Abdomen: soft, NTND. right abdominal quadrant dsg c/d/i, no s/s of hematoma or ecchymosis    Impression: 82y Female admitted with Disorder of brain now s/p biopsy of ruq lesion    Plan:  -pt stable from IR standpoint post biopsy  -plan per primary team  -f/u as outpatient with Dr. Helm and Dr. Thayer for bx results and further care       Please call IR at extension [phone] with any questions, concerns, or issues regarding above.

## 2018-12-20 NOTE — PROGRESS NOTE ADULT - SUBJECTIVE AND OBJECTIVE BOX
Patient seen and examined at bedside.    T(C): 36.6 (12-20-18 @ 05:03), Max: 37.2 (12-19-18 @ 23:42)  HR: 70 (12-20-18 @ 05:03) (61 - 80)  BP: 110/56 (12-20-18 @ 05:03) (101/57 - 134/69)  RR: 18 (12-20-18 @ 05:03) (18 - 18)  SpO2: 95% (12-20-18 @ 05:03) (94% - 95%)  Wt(kg): --    Exam:    AOx3, FC, PERRL, EOMI, no facial   4+ to 5/5 R HF but otherwise 5/5 throughout  SILT  no clonus  Incision: c/d/i

## 2018-12-20 NOTE — PROGRESS NOTE ADULT - ASSESSMENT
82 year old female with hx RCC s/p nephrectomy in 2007, known lung metastasis (not on any treatment) who lost follow up with  oncology, recently started experiencing right leg weakness, found to have hemorrhagic brain mass as well as diffuse POD on imaging.    Plan:  - CT c/a/p consistent with metastatic dx  - Obtain collateral records from [facility]  - drain for cerebral hemorrhagic cyst placed on 12/18; so far cytology neg for malig cells, contents consistent with cyst.  - clinically RUE, RLE weakness improved  - intra peritoneal biopsy on 12/19, path pending.  - Will need a tissue proven diagnosis prior to discussing treatment options   - pt is being discharged today.  We will arrange follow up at [facility] for follow up.    Seble Chamorro  Oncology Fellow  [phone]

## 2018-12-20 NOTE — PHYSICAL THERAPY INITIAL EVALUATION ADULT - PLANNED THERAPY INTERVENTIONS, PT EVAL
transfer training/bed mobility training/gait training/Stair Training: Goal: Improve to 10 steps I with single rail, step to pattern by 4 weeks.

## 2018-12-20 NOTE — DISCHARGE NOTE ADULT - CARE PLAN
Principal Discharge DX:	Brain mass  Goal:	to follow up as outpatient for treatment of mass  Assessment and plan of treatment:	follow up with Dr Parra as outpatient (neurosurgery)  follow up at [facility]   follow up with Dr Helm for neuro-onc care  continue decadron 2 mg twice daily  continue PPI 40 mg  continue keppra 500 BID  continue synthorid 88, verapamil 120

## 2018-12-20 NOTE — PHYSICAL THERAPY INITIAL EVALUATION ADULT - PERTINENT HX OF CURRENT PROBLEM, REHAB EVAL
Pt s an 82 y.o female presenting with a brain mass seen on outpatient MRI. Pt has hx renal cell carcinoma with mets to lungs and hypothyroidism. 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 MRI done on 12/14 shows left parietal mixed density mass with hemorrhagic fluid level and heterogeneous enhancement concerning for neoplasm either primary or metastatic.

## 2018-12-20 NOTE — PROGRESS NOTE ADULT - ASSESSMENT
Patient is an 82 year old female presenting with a brain mass seen on outpatient MRI. Patient has PMH of renal cell carcinoma with mets to lungs and hypothyroidism. 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 MRI done on 12/14 shows left parietal 5 x 4 x 4.3 cm AP, TR, CC mixed density mass with hemorrhagic fluid level and heterogeneous enhancement concerning for neoplasm either primary or metastatic. CT C/A/P showed diffuse pulmonary mets, multple larger heterogenous pancreatic masses, s/p left nephrectomy, hiatal hernia    Impression:   Resolving partial gerstmann syndrome and mild right foot weakness (now resolved) 2/2 L parietal brain lesion with edema into the precentral gyrus likely due to metastatic lesion, especially in setting of hx of renal cell carcinoma with widespread mets.    Plan:  [x] c/w Keppra 500 mg PO BID  [x] c/w Decadron 4 mg PO BID, will consider tapering  [x] s/p Omaya placement on 12/18   [x] CT guided bx done for intraperitoneal nodule on 12/19 - bx sample sent to lab.   [x] will work on DC, to follow up with Dr Parra and Dr Helm and Dr Perez as outpatient.

## 2018-12-20 NOTE — PROGRESS NOTE ADULT - PROVIDER SPECIALTY LIST ADULT
Heme/Onc
Heme/Onc
Internal Medicine
Intervent Radiology
Neurology
Neurosurgery
Intervent Radiology
Neurosurgery

## 2018-12-20 NOTE — PROGRESS NOTE ADULT - SUBJECTIVE AND OBJECTIVE BOX
INTERVAL HPI/OVERNIGHT EVENTS:  Patient S&E at bedside. No o/n events, patient resting comfortably. Had RLQ abdominal peritoneal biopsy yesterday.  Minimal pain from drain placement.    VITAL SIGNS:  T(F): 98.2 (12-20-18 @ 11:45)  HR: 70 (12-20-18 @ 11:45)  BP: 118/62 (12-20-18 @ 11:45)  RR: 18 (12-20-18 @ 11:45)  SpO2: 96% (12-20-18 @ 11:45)  Wt(kg): --    PHYSICAL EXAM:  Constitutional: NAD  Eyes: EOMI, sclera non-icteric  Neck: supple, no masses, no JVD  Respiratory: CTA b/l, good air entry b/l  Cardiovascular: RRR, no M/R/G  Gastrointestinal: soft, NTND, no masses palpable, + BS  Extremities: no c/c/e  Neurological: AAOx3    MEDICATIONS  (STANDING):  dexamethasone     Tablet 4 milliGRAM(s) Oral two times a day  docusate sodium 100 milliGRAM(s) Oral three times a day  enoxaparin Injectable 40 milliGRAM(s) SubCutaneous daily  levETIRAcetam 500 milliGRAM(s) Oral two times a day  levothyroxine 88 MICROGram(s) Oral daily  pantoprazole  Injectable 40 milliGRAM(s) IV Push daily  verapamil  milliGRAM(s) Oral daily    MEDICATIONS  (PRN):  acetaminophen   Tablet .. 650 milliGRAM(s) Oral every 6 hours PRN Temp greater or equal to 38C (100.4F), Mild Pain (1 - 3)    Allergies  No Known Allergies      RADIOLOGY & ADDITIONAL TESTS:  Studies reviewed.    ASSESSMENT & PLAN:

## 2018-12-20 NOTE — PHYSICAL THERAPY INITIAL EVALUATION ADULT - ADDITIONAL COMMENTS
PTA pt lived in pvt home alone, 6 steps to enter +single HR, pt independent without AD and active in community, pt denies falls past year.

## 2018-12-20 NOTE — DISCHARGE NOTE ADULT - CARE PROVIDERS DIRECT ADDRESSES
[email or web address]

## 2018-12-20 NOTE — DISCHARGE NOTE ADULT - MEDICATION SUMMARY - MEDICATIONS TO TAKE
I will START or STAY ON the medications listed below when I get home from the hospital:    --- Walker  -- 1 Walker   -- Indication: For Ambulation    dexamethasone 2 mg oral tablet  -- 1 tab(s) by mouth 2 times a day  -- Indication: For edema    verapamil 120 mg/24 hours oral capsule, extended release  -- 1 cap(s) by mouth once a day  -- Indication: For HTN    levETIRAcetam 500 mg oral tablet  -- 1 tab(s) by mouth 2 times a day  -- Indication: For seizure prophylaxis    pantoprazole 40 mg oral delayed release tablet  -- 1 tab(s) by mouth once a day  -- Indication: For GI prophylaxis    Synthroid 88 mcg (0.088 mg) oral tablet  -- 1 tab(s) by mouth once a day  -- Indication: For hypothyroidism

## 2018-12-20 NOTE — DISCHARGE NOTE ADULT - PATIENT PORTAL LINK FT
You can [facility], offered by [facility], by registering with the following website: [email or web address]

## 2018-12-24 ENCOUNTER — OUTPATIENT (OUTPATIENT)
Dept: OUTPATIENT SERVICES | Facility: HOSPITAL | Age: 83
LOS: 1 days | Discharge: HOME | End: 2018-12-24

## 2018-12-24 DIAGNOSIS — D61.01 CONSTITUTIONAL (PURE) RED BLOOD CELL APLASIA: ICD-10-CM

## 2018-12-24 DIAGNOSIS — Z90.5 ACQUIRED ABSENCE OF KIDNEY: Chronic | ICD-10-CM

## 2018-12-26 ENCOUNTER — APPOINTMENT (OUTPATIENT)
Dept: HEMATOLOGY ONCOLOGY | Facility: CLINIC | Age: 83
End: 2018-12-26
Payer: MEDICARE

## 2018-12-26 VITALS
HEIGHT: 55 IN | RESPIRATION RATE: 16 BRPM | BODY MASS INDEX: 33.16 KG/M2 | TEMPERATURE: 98 F | HEART RATE: 74 BPM | DIASTOLIC BLOOD PRESSURE: 77 MMHG | OXYGEN SATURATION: 95 % | SYSTOLIC BLOOD PRESSURE: 135 MMHG | WEIGHT: 143.3 LBS

## 2018-12-26 PROCEDURE — 99205 OFFICE O/P NEW HI 60 MIN: CPT

## 2018-12-26 RX ORDER — PAZOPANIB HYDROCHLORIDE 200 MG/1
200 TABLET, FILM COATED ORAL
Qty: 120 | Refills: 5 | Status: DISCONTINUED | COMMUNITY
Start: 2018-12-26 | End: 2018-12-26

## 2019-01-01 NOTE — HISTORY OF PRESENT ILLNESS
[Disease: _____________________] : Disease: [unfilled] [T: ___] : T[unfilled] [N: ___] : N[unfilled] [M: ___] : M[unfilled] [AJCC Stage: ____] : AJCC Stage: [unfilled] [de-identified] : Yael Willard is an 83 years old female who initially presented left side mid-back pain in 2003. CT showed a large left renal mass s/p radical nephrectomy. Pathology showed RCC, Dayna grade 3. In 2005 she was found to have small lung nodules s/p biopsy---it showed consistent with renal cell carcinoma. She has lost her follow up until recently she presented with right side lower extremity and upper extremity weakness, lower extremity worse than upper extremity, associated with unsteady gait and dizziness around one month, denies nausea/vomiting/headache/blurry vision. CT c/a/p in December 14, 2018 revealed Chest: bilateral pulmonary metastasis, left fifth rib metastasis. Abdomen and pelvis: The mass within the left nephrectomy bed which extends and involves the pancreas is compatible with recurrent renal cell cancer. Right adrenal metastasis. The peritoneal nodules are concerning for metastasis. Gallbladder nodule is suspicious for metastasis. MRI whole spine revealed no lytic or blastic lesions. No abnormal enhancement. No mass or cord compression to suggest metastases.  MRI brain showed a left parietal cystic mass in the brain. She was seen by Dr. Thayer, underwent drainage for her cystic lesion on Dec 18, 2018. Pathology of the fluid was negative for malignant cells. She is on dexamethasone twice daily. She is feeling much better for her right side extremity weakness. On December 19 she underwent ultrasound-guided biopsy of a right lower quadrant metastatic lesion. 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   [de-identified] : clear cell renal cell carcinoma

## 2019-01-01 NOTE — CONSULT LETTER
[Dear  ___] : Dear  [unfilled], [Consult Letter:] : I had the pleasure of evaluating your patient, [unfilled]. [Please see my note below.] : Please see my note below. [Consult Closing:] : Thank you very much for allowing me to participate in the care of this patient.  If you have any questions, please do not hesitate to contact me. [Sincerely,] : Sincerely, [DrAnum  ___] : Dr. LINTON [FreeTextEntry3] : Simona Navarrete MD

## 2019-01-01 NOTE — ASSESSMENT
[Palliative] : Goals of care discussed with patient: Palliative [FreeTextEntry1] : Yael Willard is an 83 years old female who initially presented left side mid-back pain in 2003. CT showed a large left renal mass s/p radical nephrectomy. Pathology showed RCC, Dayna grade 3.  In 2005 she was found to have small lung nodules s/p biopsy---it showed consistent with renal cell carcinoma. A left parietal cystic lesion was drained by Dr. Thayer. Pathology was negative. CT c/a/p in December 14, 2018 revealed Chest: bilateral pulmonary metastasis, left fifth rib metastasis. Abdomen and pelvis: The mass within the left nephrectomy bed which extends and involves the pancreas is compatible with recurrent renal cell cancer. Right adrenal metastasis. The peritoneal nodules are concerning for metastasis. Gallbladder nodule is suspicious for metastasis. US guided biopsy for her right lower quadrant lesion consistent with metastatic clear cell renal cell carcinoma. \par \par I had a lengthy discussion with the patient and her daughter regarding her current cancer status and further management. To complete staging work up she needs whole body bone scan. Her brain cystic lesion was drained with negative malignant cells. There is no evidence for RCC metastasis to her brain. At this time point I do not have any recommendation for her brain radiation. However she needs to be monitored with MRI brain periodically to see if there is any mass appearing later. She has extensive metastasis in the lungs, pancreas, peritoneum, gallbladder and right adrenal metastasis. Based on International mRCC Consortium Data Consortium (IMDC) she is in good risk group. THe standard of care is votrient for PFS benefit based on randomized phase III trial result. I had a discussion regarding potential AEs including but not limited to liver damage, hypertension, fatigue, QT prolongation, hand foot syndrome, skin rashes, nausea, vomiting, diarrhrea. Her many questions were answered in full to her satisfaction.

## 2019-01-01 NOTE — REASON FOR VISIT
[Initial Consultation] : an initial consultation [Family Member] : family member [FreeTextEntry2] : renal clear cell carcinoma

## 2019-01-01 NOTE — REVIEW OF SYSTEMS
[Recent Change In Weight] : ~T recent weight change [Constipation] : constipation [Negative] : Allergic/Immunologic [Fever] : no fever [Chills] : no chills [Night Sweats] : no night sweats [Fatigue] : no fatigue [Abdominal Pain] : no abdominal pain [Vomiting] : no vomiting [Diarrhea] : no diarrhea [FreeTextEntry2] : 8lbs weight loss in one month

## 2019-01-02 ENCOUNTER — APPOINTMENT (OUTPATIENT)
Dept: NEUROLOGY | Facility: CLINIC | Age: 84
End: 2019-01-02
Payer: MEDICARE

## 2019-01-02 ENCOUNTER — APPOINTMENT (OUTPATIENT)
Dept: NEUROSURGERY | Facility: CLINIC | Age: 84
End: 2019-01-02
Payer: MEDICARE

## 2019-01-02 VITALS
SYSTOLIC BLOOD PRESSURE: 130 MMHG | BODY MASS INDEX: 30.85 KG/M2 | TEMPERATURE: 97.8 F | WEIGHT: 143 LBS | HEIGHT: 57 IN | DIASTOLIC BLOOD PRESSURE: 62 MMHG | HEART RATE: 64 BPM

## 2019-01-02 VITALS
WEIGHT: 143 LBS | DIASTOLIC BLOOD PRESSURE: 62 MMHG | OXYGEN SATURATION: 95 % | RESPIRATION RATE: 18 BRPM | TEMPERATURE: 98 F | HEIGHT: 55 IN | HEART RATE: 68 BPM | SYSTOLIC BLOOD PRESSURE: 148 MMHG | BODY MASS INDEX: 33.09 KG/M2

## 2019-01-02 DIAGNOSIS — Z60.2 PROBLEMS RELATED TO LIVING ALONE: ICD-10-CM

## 2019-01-02 DIAGNOSIS — Z90.5 ACQUIRED ABSENCE OF KIDNEY: ICD-10-CM

## 2019-01-02 DIAGNOSIS — I10 ESSENTIAL (PRIMARY) HYPERTENSION: ICD-10-CM

## 2019-01-02 DIAGNOSIS — E03.9 HYPOTHYROIDISM, UNSPECIFIED: ICD-10-CM

## 2019-01-02 PROCEDURE — 99214 OFFICE O/P EST MOD 30 MIN: CPT

## 2019-01-02 PROCEDURE — 99024 POSTOP FOLLOW-UP VISIT: CPT

## 2019-01-02 SDOH — SOCIAL STABILITY - SOCIAL INSECURITY: PROBLEMS RELATED TO LIVING ALONE: Z60.2

## 2019-01-03 ENCOUNTER — APPOINTMENT (OUTPATIENT)
Dept: RADIATION ONCOLOGY | Facility: CLINIC | Age: 84
End: 2019-01-03
Payer: MEDICARE

## 2019-01-03 VITALS — OXYGEN SATURATION: 98 % | BODY MASS INDEX: 30.95 KG/M2 | RESPIRATION RATE: 16 BRPM | WEIGHT: 143 LBS

## 2019-01-03 PROCEDURE — 99204 OFFICE O/P NEW MOD 45 MIN: CPT | Mod: 25

## 2019-01-03 RX ORDER — PANTOPRAZOLE 40 MG/1
40 TABLET, DELAYED RELEASE ORAL
Refills: 0 | Status: ACTIVE | COMMUNITY

## 2019-01-03 NOTE — LETTER CLOSING
[Consult Closing:] : Thank you for allowing me to participate in the care of this patient.  If you have any questions, please do not hesitate to contact me. [Sincerely yours,] : Sincerely yours, [FreeTextEntry3] : Stefano Donahue MD\par Attending Physician\par Department of Radiation Medicine\par \par \par

## 2019-01-03 NOTE — HISTORY OF PRESENT ILLNESS
[FreeTextEntry1] : Ms. Felix is a 82 yo female with new diagnosis of brain metastases.  She presents to discuss the role of Gamma Knife radiosurgery.\par \par Relevant medical history dates back to 2007 when she was diagnosed with renal cell carcinoma s/p left nephrectomy.  \par Around Thanksgiving of 2018 pt noticed she was dragging her right leg and increased confusion. \par \par Her PMD ordered a non -contrast mri which was performed on 11/28/18 which revealed a left parietal mass, predominantly cystic with a small fluid level and regional mass effect.  Repeat MRI with contrast on 12/14 showed a 5 X 4 X 4.3 cm irregular nodular enhancement in the left parietal lobe concerning for neoplasm.  There was an additional 1.3 cm extra-axial left parietal parasagittal mass suggestive of meningioma though metastatic disease could not be ruled out. \par \par Extent of disease working including a CT C/A/P was performed on 12/14.  This showed bilateral pulmonary metastases, left fifth rib metastases, mass in the left nephrectomy bed involving the pancreas, right adrenal metastases, peritoneal nodules, gall bladder nodules, and lucencies within the bilateral iliac bones and femora.  MRI spine showed no evidence of disease.  \par \par On 12/18 he underwent aspiration of the intracerebral cyst and placement of an Ommaya reservoir.  Cytology from the cyst was negative for malignant cells. Biopsy on 12/19 of the intraperitoneal lesion was positive for clear cell renal cell carcinoma. \par \par She presents to discuss treatment options for her brain metastases. Pt comes today for consultation. Speech clear but legs weak and balance unsteady. No complaints of pain noted

## 2019-01-03 NOTE — REVIEW OF SYSTEMS
[Difficulty Walking] : difficulty walking [Easy Bruising] : a tendency for easy bruising [Negative] : Genitourinary [Fatigue: Grade 1 - Fatigue relieved by rest] : Fatigue: Grade 1 - Fatigue relieved by rest [Cognitive Disturbance: Grade 0] : Cognitive Disturbance: Grade 0 [Concentration Impairment: Grade 0] : Concentration Impairment: Grade 0 [Dizziness: Grade 0] : Dizziness: Grade 0  [Headache: Grade 0] : Headache: Grade 0 [Cough: Grade 0] : Cough: Grade 0 [Dyspnea: Grade 0] : Dyspnea: Grade 0

## 2019-01-03 NOTE — PHYSICAL EXAM
[Normal] : normal skin color and pigmentation and no rash [de-identified] : CN II-XII normal.  Strength upper extremity symmetrical.  RLE proximally weak. Coordination symmetrical

## 2019-01-03 NOTE — REASON FOR VISIT
[Consideration of Curative Therapy] : consideration of curative therapy for [Brain Tumor] : brain tumor [Family Member] : family member

## 2019-01-04 ENCOUNTER — EMERGENCY (EMERGENCY)
Facility: HOSPITAL | Age: 84
LOS: 1 days | Discharge: HOME | End: 2019-01-04
Attending: EMERGENCY MEDICINE
Payer: MEDICARE

## 2019-01-04 VITALS
HEART RATE: 75 BPM | DIASTOLIC BLOOD PRESSURE: 57 MMHG | RESPIRATION RATE: 17 BRPM | HEIGHT: 58 IN | TEMPERATURE: 98 F | WEIGHT: 143.08 LBS | OXYGEN SATURATION: 95 % | SYSTOLIC BLOOD PRESSURE: 121 MMHG

## 2019-01-04 DIAGNOSIS — Z90.5 ACQUIRED ABSENCE OF KIDNEY: Chronic | ICD-10-CM

## 2019-01-04 LAB
ALBUMIN SERPL ELPH-MCNC: 3.8 G/DL — SIGNIFICANT CHANGE UP (ref 3.3–5)
ALP SERPL-CCNC: 76 U/L — SIGNIFICANT CHANGE UP (ref 40–120)
ALT FLD-CCNC: 21 U/L — SIGNIFICANT CHANGE UP (ref 10–45)
ANION GAP SERPL CALC-SCNC: 13 MMOL/L — SIGNIFICANT CHANGE UP (ref 5–17)
APPEARANCE CSF: ABNORMAL
APPEARANCE SPUN FLD: ABNORMAL
APTT BLD: 21.3 SEC — LOW (ref 27.5–36.3)
AST SERPL-CCNC: 6 U/L — LOW (ref 10–40)
BASOPHILS # BLD AUTO: 0 K/UL — SIGNIFICANT CHANGE UP (ref 0–0.2)
BASOPHILS NFR BLD AUTO: 0.1 % — SIGNIFICANT CHANGE UP (ref 0–2)
BILIRUB SERPL-MCNC: 0.3 MG/DL — SIGNIFICANT CHANGE UP (ref 0.2–1.2)
BUN SERPL-MCNC: 30 MG/DL — HIGH (ref 7–23)
CALCIUM SERPL-MCNC: 8.3 MG/DL — LOW (ref 8.4–10.5)
CHLORIDE SERPL-SCNC: 99 MMOL/L — SIGNIFICANT CHANGE UP (ref 96–108)
CO2 SERPL-SCNC: 23 MMOL/L — SIGNIFICANT CHANGE UP (ref 22–31)
COLOR CSF: ABNORMAL
CREAT SERPL-MCNC: 0.76 MG/DL — SIGNIFICANT CHANGE UP (ref 0.5–1.3)
EOSINOPHIL # BLD AUTO: 0 K/UL — SIGNIFICANT CHANGE UP (ref 0–0.5)
EOSINOPHIL NFR BLD AUTO: 0.1 % — SIGNIFICANT CHANGE UP (ref 0–6)
GLUCOSE CSF-MCNC: 157 MG/DL — HIGH (ref 40–70)
GLUCOSE SERPL-MCNC: 148 MG/DL — HIGH (ref 70–99)
GRAM STN FLD: SIGNIFICANT CHANGE UP
HCT VFR BLD CALC: 36 % — SIGNIFICANT CHANGE UP (ref 34.5–45)
HGB BLD-MCNC: 11.3 G/DL — LOW (ref 11.5–15.5)
INR BLD: 0.96 RATIO — SIGNIFICANT CHANGE UP (ref 0.88–1.16)
LYMPHOCYTES # BLD AUTO: 0.6 K/UL — LOW (ref 1–3.3)
LYMPHOCYTES # BLD AUTO: 6.1 % — LOW (ref 13–44)
LYMPHOCYTES # CSF: 39 % — LOW (ref 40–80)
MCHC RBC-ENTMCNC: 23.7 PG — LOW (ref 27–34)
MCHC RBC-ENTMCNC: 31.4 GM/DL — LOW (ref 32–36)
MCV RBC AUTO: 75.6 FL — LOW (ref 80–100)
MONOCYTES # BLD AUTO: 0.9 K/UL — SIGNIFICANT CHANGE UP (ref 0–0.9)
MONOCYTES NFR BLD AUTO: 9.2 % — SIGNIFICANT CHANGE UP (ref 2–14)
MONOS+MACROS NFR CSF: 61 % — HIGH (ref 15–45)
NEUTROPHILS # BLD AUTO: 8.5 K/UL — HIGH (ref 1.8–7.4)
NEUTROPHILS # CSF: 0 % — SIGNIFICANT CHANGE UP (ref 0–6)
NEUTROPHILS NFR BLD AUTO: 84.6 % — HIGH (ref 43–77)
NRBC NFR CSF: 44 /UL — HIGH (ref 0–5)
PLATELET # BLD AUTO: 219 K/UL — SIGNIFICANT CHANGE UP (ref 150–400)
POTASSIUM SERPL-MCNC: 5.1 MMOL/L — SIGNIFICANT CHANGE UP (ref 3.5–5.3)
POTASSIUM SERPL-SCNC: 5.1 MMOL/L — SIGNIFICANT CHANGE UP (ref 3.5–5.3)
PROT CSF-MCNC: 275 MG/DL — HIGH (ref 15–45)
PROT SERPL-MCNC: 6.2 G/DL — SIGNIFICANT CHANGE UP (ref 6–8.3)
PROTHROM AB SERPL-ACNC: 11 SEC — SIGNIFICANT CHANGE UP (ref 10–12.9)
RBC # BLD: 4.77 M/UL — SIGNIFICANT CHANGE UP (ref 3.8–5.2)
RBC # CSF: HIGH /UL (ref 0–0)
RBC # FLD: 16.1 % — HIGH (ref 10.3–14.5)
SODIUM SERPL-SCNC: 135 MMOL/L — SIGNIFICANT CHANGE UP (ref 135–145)
SPECIMEN SOURCE: SIGNIFICANT CHANGE UP
TUBE TYPE: SIGNIFICANT CHANGE UP
WBC # BLD: 10.1 K/UL — SIGNIFICANT CHANGE UP (ref 3.8–10.5)
WBC # FLD AUTO: 10.1 K/UL — SIGNIFICANT CHANGE UP (ref 3.8–10.5)

## 2019-01-04 PROCEDURE — 70450 CT HEAD/BRAIN W/O DYE: CPT

## 2019-01-04 PROCEDURE — 87070 CULTURE OTHR SPECIMN AEROBIC: CPT

## 2019-01-04 PROCEDURE — 85027 COMPLETE CBC AUTOMATED: CPT

## 2019-01-04 PROCEDURE — 71045 X-RAY EXAM CHEST 1 VIEW: CPT

## 2019-01-04 PROCEDURE — 84157 ASSAY OF PROTEIN OTHER: CPT

## 2019-01-04 PROCEDURE — 99285 EMERGENCY DEPT VISIT HI MDM: CPT | Mod: 25

## 2019-01-04 PROCEDURE — 89051 BODY FLUID CELL COUNT: CPT

## 2019-01-04 PROCEDURE — 80053 COMPREHEN METABOLIC PANEL: CPT

## 2019-01-04 PROCEDURE — 87205 SMEAR GRAM STAIN: CPT

## 2019-01-04 PROCEDURE — 85730 THROMBOPLASTIN TIME PARTIAL: CPT

## 2019-01-04 PROCEDURE — 82945 GLUCOSE OTHER FLUID: CPT

## 2019-01-04 PROCEDURE — 70250 X-RAY EXAM OF SKULL: CPT | Mod: 26

## 2019-01-04 PROCEDURE — 70250 X-RAY EXAM OF SKULL: CPT

## 2019-01-04 PROCEDURE — 62270 DX LMBR SPI PNXR: CPT

## 2019-01-04 PROCEDURE — 71045 X-RAY EXAM CHEST 1 VIEW: CPT | Mod: 26

## 2019-01-04 PROCEDURE — 70450 CT HEAD/BRAIN W/O DYE: CPT | Mod: 26,77

## 2019-01-04 PROCEDURE — 88184 FLOWCYTOMETRY/ TC 1 MARKER: CPT

## 2019-01-04 PROCEDURE — 74018 RADEX ABDOMEN 1 VIEW: CPT

## 2019-01-04 PROCEDURE — 70450 CT HEAD/BRAIN W/O DYE: CPT | Mod: 26

## 2019-01-04 PROCEDURE — 85610 PROTHROMBIN TIME: CPT

## 2019-01-04 PROCEDURE — 88189 FLOWCYTOMETRY/READ 16 & >: CPT

## 2019-01-04 PROCEDURE — 99284 EMERGENCY DEPT VISIT MOD MDM: CPT

## 2019-01-04 PROCEDURE — 88185 FLOWCYTOMETRY/TC ADD-ON: CPT

## 2019-01-04 PROCEDURE — 74018 RADEX ABDOMEN 1 VIEW: CPT | Mod: 26

## 2019-01-04 NOTE — ED PROVIDER NOTE - PROGRESS NOTE DETAILS
discussed case with Neurosurgery. Cleared for discharge. Patient feeling better. Agreeable to discharge.

## 2019-01-04 NOTE — CONSULT NOTE ADULT - ASSESSMENT
83F with likely RCC mets to brain s/p ommaya placement with enlargement of previously drained cyst now with transient but worsening R sided weakness likely from mass effect  - Drained 20cc of bloody CSF from ommaya resevoir  - Repeat CTH now s/p bedside tap  - If CTH unremarkable may f/u with Dr. Thayer in 1 week for gamma knife as scheduled  - Pt should be instructed to call office / followup if any new symptoms arise between now and then

## 2019-01-04 NOTE — ED PROVIDER NOTE - OBJECTIVE STATEMENT
84 yo F with pmhx renal cancer with brain mets and Brain surgery  12/18/2018  ommaya placement in left parietal tumor presenting with right sided weakness x 2-3 days. As per patient, prior to her procedure she had similar symptoms which resolved with surgery. Patient 82 yo F with pmhx renal cancer with brain mets and Brain surgery  12/18/2018  ommaya placement in left parietal tumor presenting with right sided weakness x 2-3 days. As per patient, prior to her procedure she had similar symptoms which resolved with surgery. Patient states for the past two days she has been having increased right hand and right leg "limp". Patient called her Neurologist and sent in. Patient denies ha, blurred vision, aphasia, tingling, numbness, fever, cp, sob, cough, abd pain, nvd, dysuria and hematuria

## 2019-01-04 NOTE — ED ADULT NURSE NOTE - CHPI ED NUR SYMPTOMS NEG
no dizziness/no blurred vision/no confusion/no fever/no loss of consciousness/no nausea/no numbness/no vomiting/no change in level of consciousness/no weakness

## 2019-01-04 NOTE — ED PROVIDER NOTE - CARE PLAN
Principal Discharge DX:	Brain cyst  Assessment and plan of treatment:	Follow up with your PMD/neurosurgeon within 48-72 hours.  Rest, Take Tylenol 650mg 1 tab every 4-6 hours as needed for pain .  Any nausea, vomiting, weakness, pain, dizziness, changes in vision return to ER

## 2019-01-04 NOTE — PROCEDURE NOTE - PROCEDURE
<<-----Click on this checkbox to enter Procedure Puncture of shunt tubing or reservoir  01/04/2019    Active  [id number]

## 2019-01-04 NOTE — ED PROVIDER NOTE - ATTENDING CONTRIBUTION TO CARE
Pt with recurrent right sided weakness constant, progressive over 2 days similar to previous episode of cyst of left parietal region brain s/p drainage.  No fever, vomiting, cp, sob.  Appears well, nontoxic.

## 2019-01-04 NOTE — ED PROVIDER NOTE - PLAN OF CARE
Follow up with your PMD/neurosurgeon within 48-72 hours.  Rest, Take Tylenol 650mg 1 tab every 4-6 hours as needed for pain .  Any nausea, vomiting, weakness, pain, dizziness, changes in vision return to ER

## 2019-01-04 NOTE — CONSULT NOTE ADULT - SUBJECTIVE AND OBJECTIVE BOX
p ([id number])     HPI:  83 F with pmhx renal cancer with brain mets and Brain surgery  12/18/2018  ommaya placement in left parietal tumor presenting with right sided weakness x 2-3 days. As per patient, prior to her procedure she had similar symptoms which resolved with surgery. Patient states for the past two days she has been having increased right hand and right leg "limp". Patient called her Neurologist and sent in. Patient denies ha, blurred vision, aphasia, tingling, numbness, fever, cp, sob, cough, abd pain, nvd, dysuria and hematuria.  Weakness has resolved since being in the ED    Imaging:  Enlargment of the L parietal cyst with ommaya in place    Exam:  AOx3, FC, PERRL, EOMI, no facial   5/5 throughout, no drift  SILT  no clonus        --Anticoagulation:    =====================  PAST MEDICAL HISTORY   Essential (primary) hypertension  Hypothyroidism, unspecified type  Renal cancer    PAST SURGICAL HISTORY   History of nephrectomy        MEDICATIONS:  Antibiotics:    Neuro:    Other:      SOCIAL HISTORY:   Occupation:   Marital Status:     FAMILY HISTORY:  No pertinent family history in first degree relatives      ROS: Negative except per HPI    LABS:  PT/INR - ( 04 Jan 2019 16:29 )   PT: 11.0 sec;   INR: 0.96 ratio         PTT - ( 04 Jan 2019 16:29 )  PTT:21.3 sec                        11.3   10.1  )-----------( 219      ( 04 Jan 2019 16:29 )             36.0     01-04    135  |  99  |  30<H>  ----------------------------<  148<H>  5.1   |  23  |  0.76    Ca    8.3<L>      04 Jan 2019 16:29    TPro  6.2  /  Alb  3.8  /  TBili  0.3  /  DBili  x   /  AST  6<L>  /  ALT  21  /  AlkPhos  76  01-04

## 2019-01-04 NOTE — ED ADULT NURSE NOTE - OBJECTIVE STATEMENT
82 yo F c/o of R arm and R leg weakness. PMH Renal CA w/ mets to Brainn. Pt reports symptoms began about 2 days ago. Had brain procedure done 12/18/18. Pt Received AXOX3 Speech Clear. Pt has symmetrical strength and sensation with no paresthesia. Pt pupils are PERRL, and gag reflex intact. Behaviour appropriate to situation.

## 2019-01-04 NOTE — ED PROVIDER NOTE - MEDICAL DECISION MAKING DETAILS
84 yo F with pmhx renal cancer with brain mets and Brain surgery  12/18/2018  ommaya placement in left parietal tumor presenting with right sided weakness x 2-3 days. WIll obtain labs and ct. Consult neurosurgery

## 2019-01-04 NOTE — ED ADULT TRIAGE NOTE - CHIEF COMPLAINT QUOTE
had a procedure done 12/21 to drain excess fluid off the brain by MD Sotomayor, she feels like the fluids is building up again, co lethargic and decreased motor skills

## 2019-01-07 LAB
CULTURE RESULTS: NO GROWTH — SIGNIFICANT CHANGE UP
SPECIMEN SOURCE: SIGNIFICANT CHANGE UP
TM INTERPRETATION: SIGNIFICANT CHANGE UP

## 2019-01-09 ENCOUNTER — FORM ENCOUNTER (OUTPATIENT)
Age: 84
End: 2019-01-09

## 2019-01-10 ENCOUNTER — APPOINTMENT (OUTPATIENT)
Dept: CT IMAGING | Facility: IMAGING CENTER | Age: 84
End: 2019-01-10

## 2019-01-10 ENCOUNTER — APPOINTMENT (OUTPATIENT)
Dept: NEUROSURGERY | Facility: CLINIC | Age: 84
End: 2019-01-10
Payer: MEDICARE

## 2019-01-10 ENCOUNTER — OUTPATIENT (OUTPATIENT)
Dept: OUTPATIENT SERVICES | Facility: HOSPITAL | Age: 84
LOS: 1 days | End: 2019-01-10
Payer: MEDICARE

## 2019-01-10 ENCOUNTER — APPOINTMENT (OUTPATIENT)
Dept: MRI IMAGING | Facility: IMAGING CENTER | Age: 84
End: 2019-01-10

## 2019-01-10 ENCOUNTER — OUTPATIENT (OUTPATIENT)
Dept: OUTPATIENT SERVICES | Facility: HOSPITAL | Age: 84
LOS: 1 days | Discharge: HOME | End: 2019-01-10
Payer: MEDICARE

## 2019-01-10 DIAGNOSIS — C79.31 SECONDARY MALIGNANT NEOPLASM OF BRAIN: ICD-10-CM

## 2019-01-10 DIAGNOSIS — Z90.5 ACQUIRED ABSENCE OF KIDNEY: Chronic | ICD-10-CM

## 2019-01-10 PROCEDURE — 77263 THER RADIOLOGY TX PLNG CPLX: CPT

## 2019-01-10 PROCEDURE — 61800 APPLY SRS HEADFRAME ADD-ON: CPT

## 2019-01-10 PROCEDURE — 76498 UNLISTED MR PROCEDURE: CPT

## 2019-01-10 PROCEDURE — 70450 CT HEAD/BRAIN W/O DYE: CPT

## 2019-01-10 PROCEDURE — 77432 STEREOTACTIC RADIATION TRMT: CPT

## 2019-01-10 PROCEDURE — 77300 RADIATION THERAPY DOSE PLAN: CPT | Mod: 26

## 2019-01-10 PROCEDURE — 70450 CT HEAD/BRAIN W/O DYE: CPT | Mod: 26

## 2019-01-10 PROCEDURE — 61070 BRAIN CANAL SHUNT PROCEDURE: CPT | Mod: 78

## 2019-01-10 PROCEDURE — 77295 3-D RADIOTHERAPY PLAN: CPT | Mod: 26

## 2019-01-10 PROCEDURE — 77334 RADIATION TREATMENT AID(S): CPT | Mod: 26

## 2019-01-10 PROCEDURE — 61798 SRS CRANIAL LESION COMPLEX: CPT | Mod: 78

## 2019-01-10 PROCEDURE — A9585: CPT

## 2019-01-16 ENCOUNTER — CHART COPY (OUTPATIENT)
Age: 84
End: 2019-01-16

## 2019-01-16 ENCOUNTER — FORM ENCOUNTER (OUTPATIENT)
Age: 84
End: 2019-01-16

## 2019-01-17 ENCOUNTER — OTHER (OUTPATIENT)
Age: 84
End: 2019-01-17

## 2019-01-17 ENCOUNTER — OUTPATIENT (OUTPATIENT)
Dept: OUTPATIENT SERVICES | Facility: HOSPITAL | Age: 84
LOS: 1 days | End: 2019-01-17
Payer: MEDICARE

## 2019-01-17 ENCOUNTER — APPOINTMENT (OUTPATIENT)
Dept: CT IMAGING | Facility: IMAGING CENTER | Age: 84
End: 2019-01-17
Payer: MEDICARE

## 2019-01-17 ENCOUNTER — APPOINTMENT (OUTPATIENT)
Dept: NEUROSURGERY | Facility: CLINIC | Age: 84
End: 2019-01-17
Payer: MEDICARE

## 2019-01-17 DIAGNOSIS — C79.31 SECONDARY MALIGNANT NEOPLASM OF BRAIN: ICD-10-CM

## 2019-01-17 DIAGNOSIS — Z90.5 ACQUIRED ABSENCE OF KIDNEY: Chronic | ICD-10-CM

## 2019-01-17 PROCEDURE — 61070 BRAIN CANAL SHUNT PROCEDURE: CPT | Mod: 78

## 2019-01-17 PROCEDURE — 70450 CT HEAD/BRAIN W/O DYE: CPT

## 2019-01-17 PROCEDURE — 70450 CT HEAD/BRAIN W/O DYE: CPT | Mod: 26

## 2019-01-17 PROCEDURE — 99024 POSTOP FOLLOW-UP VISIT: CPT

## 2019-01-23 ENCOUNTER — FORM ENCOUNTER (OUTPATIENT)
Age: 84
End: 2019-01-23

## 2019-01-24 ENCOUNTER — APPOINTMENT (OUTPATIENT)
Dept: NEUROSURGERY | Facility: CLINIC | Age: 84
End: 2019-01-24
Payer: MEDICARE

## 2019-01-24 ENCOUNTER — OUTPATIENT (OUTPATIENT)
Dept: OUTPATIENT SERVICES | Facility: HOSPITAL | Age: 84
LOS: 1 days | End: 2019-01-24
Payer: MEDICARE

## 2019-01-24 ENCOUNTER — APPOINTMENT (OUTPATIENT)
Dept: CT IMAGING | Facility: IMAGING CENTER | Age: 84
End: 2019-01-24
Payer: MEDICARE

## 2019-01-24 ENCOUNTER — APPOINTMENT (OUTPATIENT)
Dept: ULTRASOUND IMAGING | Facility: IMAGING CENTER | Age: 84
End: 2019-01-24

## 2019-01-24 VITALS
RESPIRATION RATE: 16 BRPM | SYSTOLIC BLOOD PRESSURE: 124 MMHG | OXYGEN SATURATION: 89 % | TEMPERATURE: 100.9 F | HEART RATE: 96 BPM | BODY MASS INDEX: 30.02 KG/M2 | DIASTOLIC BLOOD PRESSURE: 54 MMHG | HEIGHT: 58 IN | WEIGHT: 143 LBS

## 2019-01-24 DIAGNOSIS — C79.31 SECONDARY MALIGNANT NEOPLASM OF BRAIN: ICD-10-CM

## 2019-01-24 DIAGNOSIS — Z90.5 ACQUIRED ABSENCE OF KIDNEY: Chronic | ICD-10-CM

## 2019-01-24 PROCEDURE — 70450 CT HEAD/BRAIN W/O DYE: CPT | Mod: 26

## 2019-01-24 PROCEDURE — 61070 BRAIN CANAL SHUNT PROCEDURE: CPT | Mod: 78

## 2019-01-24 PROCEDURE — 70450 CT HEAD/BRAIN W/O DYE: CPT

## 2019-01-24 PROCEDURE — 93970 EXTREMITY STUDY: CPT | Mod: 26

## 2019-01-24 PROCEDURE — 99024 POSTOP FOLLOW-UP VISIT: CPT

## 2019-01-24 PROCEDURE — 93970 EXTREMITY STUDY: CPT

## 2019-01-24 RX ORDER — DILTIAZEM HYDROCHLORIDE 120 MG/1
120 CAPSULE, EXTENDED RELEASE ORAL
Refills: 0 | Status: DISCONTINUED | COMMUNITY
End: 2019-01-24

## 2019-01-24 RX ORDER — DEXAMETHASONE 6 MG/1
TABLET ORAL
Refills: 0 | Status: DISCONTINUED | COMMUNITY
End: 2019-01-24

## 2019-01-24 RX ORDER — LEVETIRACETAM 500 MG/1
500 TABLET, FILM COATED, EXTENDED RELEASE ORAL DAILY
Qty: 30 | Refills: 3 | Status: DISCONTINUED | COMMUNITY
Start: 2019-01-02 | End: 2019-01-24

## 2019-01-24 NOTE — REASON FOR VISIT
[Other: _____] : [unfilled] [de-identified] : left parietal hilda hole with aspiration of intracerebral cyst; placement of Omaya reservoir [de-identified] : 12/18/2018 [de-identified] : 6

## 2019-01-24 NOTE — DATA REVIEWED
[de-identified] : EXAM: CT BRAIN \par \par \par PROCEDURE DATE: 01/24/2019 \par \par \par \par INTERPRETATION: \par CLINICAL INDICATION: Follow-up left parietal cystic neoplasm \par \par 5mm axial sections of the brain were obtained from base to vertex, without \par the intravenous administration of contrast material. Coronal and sagittal \par computer generated reconstructed views are available. \par \par Comparison is made with the prior CT of 1/17/2019 \par \par \par \par The cystic mass in the left parietal region with a focus of hemorrhage along \par its inferior margin is unchanged since the prior exam except that there has \par been resolution of postoperative air from the cyst. An Ommaya catheter \par enters from a left parietal root to have its tip within the cyst. The cyst \par measures 4.2 cm in AP diameter. There is vasogenic edema and mass effect on \par the left lateral ventricle which is compressed and slightly displaced \par inferiorly. There is no midline shift. \par \par IMPRESSION: No change in cystic and solid neoplasm in the left parietal \par region except for resolution of postoperative air compared with 1/17/2019. \par An Ommaya catheter has its tip in the cyst. \par \par \par \par \par \par \par \par \par \par \par JEAN PIERRE LEON M.D., ATTENDING RADIOLOGIST \par This document has been electronically signed. Jan 24 2019 11:28AM

## 2019-01-24 NOTE — PHYSICAL EXAM
[FreeTextEntry1] : Awake, alert, and oriented x 3. Speech is clear and appropriate.  Affect is normal.  Voice is strong.  Respirations easy and even.  Short and long term memory intact.  Attention span and concentration intact.  Language fluency, comprehension, and reading intact.  Fund of knowledge intact.  Normal skin color and pigmentation.  The sclera and conjunctiva normal with arcus senilis.  Ears, nose, and neck normal in appearance. EOMI, no nystagmus, facial sensation intact symmetrically, face symmetrical, hearing intact bilaterally, tongue and palate midline, head turning and shoulder shrug symmetric and no tongue deviation with protrusion.  No pronator drift.   No past-pointing, no tremors noted, no dysdiadochokinesia, and finger to nose dysmetria was not present.  Romberg negative. Right hand dominant.  Incision well healed. \par \par Rises from a seated position in a fluent and comfortable fashion.  Comes to the office in a wheelchair.  2+ ankle and pedal edema on the left, 1+on the right with mild pain to palpation of calf on the left.  (+) pedal pulses.  Motor strength in the upper extremities in 5/5 in the hand , biceps, and triceps. Motor strength in the lower extremities is 5/5 in the iliopsoas, quadriceps, and hamstrings.\par

## 2019-01-24 NOTE — HISTORY OF PRESENT ILLNESS
[FreeTextEntry1] : TOSHA SPARKS is a 83 year old female here on 01/24/2019, 3 weeks after she was last seen and 5.5 weeks after undergoing left parietal hilda hole with aspiration of intracerebral cyst and placement of Ommaya reservoir on 12/18/2018.  To review, she has a PMH of renal cell carcinoma (s/p L nephrectomy in 2007) with mets to lungs, hypothyroidism (no chemo/radiation tx) who was initially referred to Dr. Helm for right side weakness, focal seizure and MRI finding of a left parietal cystic mass concerning metastasis. She was admitted at [facility] and CT C/A/P showed bilateral pulmonary mets, multiple pancreatic masses, right adrenal mass and peritoneal/gall bladder nodules. On 12/18 she underwent cyst fenestration and placement of reservoir (patho for cystic fluid showed negative malignancy) following intraperitoneal biopsy on the next day (patho result of renal cell carcinoma grade 2). Post op hospital course was uneventful and she was discharged to home. \par \par She comes to the office today with a new CT reporting . Per Dr. Donahue, she underwent frame based Gamma Knife radiosurgery which she had done on 1/11/2019. Denies memory, headache, dizziness, seizure (never), confusion, visual/smell/taste/hearing disturbance, gait disturbance, or imbalance. She has completed therapy.  She uses a walker to walk in the house.  She doesn't go out of the house by herself.  Prior to this, she was independent and drove.  She has some constipation.  Last BM this am after laxative.  \par \par \par PCP: Dr. Escobar\par Onc: Dr. Navarrete\par Neuro-Onc: Dr. Helm\par Rad/Onc: Dr. Donahue

## 2019-01-24 NOTE — REVIEW OF SYSTEMS
[Arm Weakness] : arm weakness [Leg Weakness] : leg weakness [Numbness] : numbness [Difficulty Walking] : difficulty walking [Lower Ext Edema] : lower extremity edema [Limb Swelling] : limb swelling [Negative] : Heme/Lymph [Fever] : no fever [Chills] : no chills [Confused or Disoriented] : no confusion [Memory Lapses or Loss] : no memory loss [Difficulty with Language] : no ~M difficulty with language [Dizziness] : no dizziness [Lightheadedness] : no lightheadedness [Tension Headache] : no tension-type headache [Frequent Falls] : not falling [Eye Pain] : no eye pain [Red Eyes] : eyes not red [Eyesight Problems] : no eyesight problems [Shortness Of Breath] : no shortness of breath [Cough] : no cough [Dysuria] : no dysuria [Incontinence] : no incontinence

## 2019-01-28 ENCOUNTER — EMERGENCY (EMERGENCY)
Facility: HOSPITAL | Age: 84
LOS: 1 days | Discharge: HOME | End: 2019-01-28
Attending: EMERGENCY MEDICINE
Payer: MEDICARE

## 2019-01-28 VITALS
HEART RATE: 105 BPM | RESPIRATION RATE: 18 BRPM | TEMPERATURE: 99 F | DIASTOLIC BLOOD PRESSURE: 63 MMHG | SYSTOLIC BLOOD PRESSURE: 137 MMHG | OXYGEN SATURATION: 91 % | WEIGHT: 145.95 LBS

## 2019-01-28 DIAGNOSIS — Z90.5 ACQUIRED ABSENCE OF KIDNEY: Chronic | ICD-10-CM

## 2019-01-28 PROCEDURE — 99284 EMERGENCY DEPT VISIT MOD MDM: CPT | Mod: 25

## 2019-01-28 PROCEDURE — 93010 ELECTROCARDIOGRAM REPORT: CPT

## 2019-01-28 NOTE — ED PROVIDER NOTE - OBJECTIVE STATEMENT
83F with likely RCC mets to brain s/p ommaya placement with previously drained cysts, s/p gamma knife presents with weakness in the setting of low hgb. Pt reports she's had recent procedures to drain cysts and was not paying attention to other sx but reports progressive weakness, exertional dyspnea, lethargy. Had anemia in the past requiring transfusions. Reports dark stools over the last week which prompted visit with pmd, hgb was reported to be 8 and was sent to ED for transfusions.

## 2019-01-28 NOTE — ED PROVIDER NOTE - ATTENDING CONTRIBUTION TO CARE
Nemes - 84yo F renal CA, mets to brain s/p NeuroSx procedure, s/p gamma knife p/w gen weakness in the setting of low hgb as per PMD today, Hb-8.8 this morning. Progressive weakness, exertional dyspnea, lethargy for 1 week. Reports dark stools over the last week. No abdom pains, no other stx. VS wnl, abdomen soft/NT, no CVAT, pale conjunctivae, lungs/cardiac wnl. Will get labs, consider transfusion/admission

## 2019-01-28 NOTE — ED PROVIDER NOTE - MEDICAL DECISION MAKING DETAILS
symptomatic anemia, will require transfusion, check occult stool, basic labs-pt appears very well, hd stable, afebrile with otherwise normal exam   Margy Sam, PGY-2 EM

## 2019-01-28 NOTE — ED CLERICAL - NS ED CLERK NOTE PRE-ARRIVAL INFORMATION; ADDITIONAL PRE-ARRIVAL INFORMATION
CC/Reason For referral:  MET RENAL CA TO BRAIN. DIZZY, PALPITATIONS. HEMOGLOBIN 8.8  Preferred Consultant(if applicable):  Who admits for you (if needed):  [facility]  Do you have documents you would like to fax over?  NO  Would you still like to speak to an ED attending?   PLEASE CALL AFTER PATIENT IS SEEN

## 2019-01-28 NOTE — ED PROVIDER NOTE - NS ED ROS FT
General: +weakness   HENT: No head trauma, ear pain, runny nose, or sore throat  Eyes: No visual changes  CP: No chest pain, palpitations, or light headedness  Resp: +exertional dyspnea   GI: +dark stool, no abdominal pain, diarrhea, constipation, nausea, or vomiting  : No urinary fz, dysuria, or hematuria  Neuro: No numbness, tingling, or weakness

## 2019-01-29 ENCOUNTER — FORM ENCOUNTER (OUTPATIENT)
Age: 84
End: 2019-01-29

## 2019-01-29 VITALS
SYSTOLIC BLOOD PRESSURE: 144 MMHG | DIASTOLIC BLOOD PRESSURE: 74 MMHG | HEART RATE: 106 BPM | OXYGEN SATURATION: 96 % | RESPIRATION RATE: 18 BRPM | TEMPERATURE: 98 F

## 2019-01-29 LAB
ALBUMIN SERPL ELPH-MCNC: 2.8 G/DL — LOW (ref 3.3–5)
ALP SERPL-CCNC: 72 U/L — SIGNIFICANT CHANGE UP (ref 40–120)
ALT FLD-CCNC: 21 U/L — SIGNIFICANT CHANGE UP (ref 10–45)
ANION GAP SERPL CALC-SCNC: 11 MMOL/L — SIGNIFICANT CHANGE UP (ref 5–17)
AST SERPL-CCNC: 27 U/L — SIGNIFICANT CHANGE UP (ref 10–40)
BASOPHILS # BLD AUTO: 0 K/UL — SIGNIFICANT CHANGE UP (ref 0–0.2)
BASOPHILS NFR BLD AUTO: 0.5 % — SIGNIFICANT CHANGE UP (ref 0–2)
BILIRUB SERPL-MCNC: 0.2 MG/DL — SIGNIFICANT CHANGE UP (ref 0.2–1.2)
BLD GP AB SCN SERPL QL: NEGATIVE — SIGNIFICANT CHANGE UP
BUN SERPL-MCNC: 18 MG/DL — SIGNIFICANT CHANGE UP (ref 7–23)
CALCIUM SERPL-MCNC: 8.2 MG/DL — LOW (ref 8.4–10.5)
CHLORIDE SERPL-SCNC: 105 MMOL/L — SIGNIFICANT CHANGE UP (ref 96–108)
CO2 SERPL-SCNC: 21 MMOL/L — LOW (ref 22–31)
CREAT SERPL-MCNC: 0.8 MG/DL — SIGNIFICANT CHANGE UP (ref 0.5–1.3)
EOSINOPHIL # BLD AUTO: 0.1 K/UL — SIGNIFICANT CHANGE UP (ref 0–0.5)
EOSINOPHIL NFR BLD AUTO: 1.1 % — SIGNIFICANT CHANGE UP (ref 0–6)
GLUCOSE SERPL-MCNC: 135 MG/DL — HIGH (ref 70–99)
HCT VFR BLD CALC: 30.6 % — LOW (ref 34.5–45)
HGB BLD-MCNC: 9.8 G/DL — LOW (ref 11.5–15.5)
LYMPHOCYTES # BLD AUTO: 1.4 K/UL — SIGNIFICANT CHANGE UP (ref 1–3.3)
LYMPHOCYTES # BLD AUTO: 27.5 % — SIGNIFICANT CHANGE UP (ref 13–44)
MCHC RBC-ENTMCNC: 24 PG — LOW (ref 27–34)
MCHC RBC-ENTMCNC: 32.1 GM/DL — SIGNIFICANT CHANGE UP (ref 32–36)
MCV RBC AUTO: 74.6 FL — LOW (ref 80–100)
MONOCYTES # BLD AUTO: 0.6 K/UL — SIGNIFICANT CHANGE UP (ref 0–0.9)
MONOCYTES NFR BLD AUTO: 11 % — SIGNIFICANT CHANGE UP (ref 2–14)
NEUTROPHILS # BLD AUTO: 3.2 K/UL — SIGNIFICANT CHANGE UP (ref 1.8–7.4)
NEUTROPHILS NFR BLD AUTO: 59.9 % — SIGNIFICANT CHANGE UP (ref 43–77)
OB PNL STL: NEGATIVE — SIGNIFICANT CHANGE UP
PLATELET # BLD AUTO: 264 K/UL — SIGNIFICANT CHANGE UP (ref 150–400)
POTASSIUM SERPL-MCNC: 5.2 MMOL/L — SIGNIFICANT CHANGE UP (ref 3.5–5.3)
POTASSIUM SERPL-SCNC: 5.2 MMOL/L — SIGNIFICANT CHANGE UP (ref 3.5–5.3)
PROT SERPL-MCNC: 6 G/DL — SIGNIFICANT CHANGE UP (ref 6–8.3)
RBC # BLD: 4.11 M/UL — SIGNIFICANT CHANGE UP (ref 3.8–5.2)
RBC # FLD: 16.8 % — HIGH (ref 10.3–14.5)
RH IG SCN BLD-IMP: POSITIVE — SIGNIFICANT CHANGE UP
SODIUM SERPL-SCNC: 137 MMOL/L — SIGNIFICANT CHANGE UP (ref 135–145)
WBC # BLD: 5.3 K/UL — SIGNIFICANT CHANGE UP (ref 3.8–10.5)
WBC # FLD AUTO: 5.3 K/UL — SIGNIFICANT CHANGE UP (ref 3.8–10.5)

## 2019-01-29 PROCEDURE — 86900 BLOOD TYPING SEROLOGIC ABO: CPT

## 2019-01-29 PROCEDURE — 86850 RBC ANTIBODY SCREEN: CPT

## 2019-01-29 PROCEDURE — 80053 COMPREHEN METABOLIC PANEL: CPT

## 2019-01-29 PROCEDURE — 82272 OCCULT BLD FECES 1-3 TESTS: CPT

## 2019-01-29 PROCEDURE — 99283 EMERGENCY DEPT VISIT LOW MDM: CPT

## 2019-01-29 PROCEDURE — 93005 ELECTROCARDIOGRAM TRACING: CPT

## 2019-01-29 PROCEDURE — 86901 BLOOD TYPING SEROLOGIC RH(D): CPT

## 2019-01-29 PROCEDURE — 85027 COMPLETE CBC AUTOMATED: CPT

## 2019-01-29 NOTE — ED ADULT NURSE NOTE - OBJECTIVE STATEMENT
83y female with hx of GI bleed,  shunt placement for hydrocephalus, symptomatic anemia presents to the ER for low h&h. pt is alert and oriented x 4 and speaking coherently. Pt states she has had dark stools for a few weeks, but has other major health problems she has had going on. pt states she went to the pcp today for the stools and was told to come to the ER for the dark stools. pt denies cp, n/v/d, fevers, abdominal cramping. pt in nad. pt states she has dyspnea upon exertion, not at rest. md pace completed. will reassess.

## 2019-01-30 ENCOUNTER — APPOINTMENT (OUTPATIENT)
Dept: NEUROSURGERY | Facility: CLINIC | Age: 84
End: 2019-01-30
Payer: MEDICARE

## 2019-01-30 ENCOUNTER — APPOINTMENT (OUTPATIENT)
Dept: CT IMAGING | Facility: IMAGING CENTER | Age: 84
End: 2019-01-30
Payer: MEDICARE

## 2019-01-30 ENCOUNTER — OUTPATIENT (OUTPATIENT)
Dept: OUTPATIENT SERVICES | Facility: HOSPITAL | Age: 84
LOS: 1 days | End: 2019-01-30
Payer: MEDICARE

## 2019-01-30 DIAGNOSIS — C79.31 SECONDARY MALIGNANT NEOPLASM OF BRAIN: ICD-10-CM

## 2019-01-30 DIAGNOSIS — Z90.5 ACQUIRED ABSENCE OF KIDNEY: Chronic | ICD-10-CM

## 2019-01-30 PROCEDURE — 61070 BRAIN CANAL SHUNT PROCEDURE: CPT | Mod: 78

## 2019-01-30 PROCEDURE — 70450 CT HEAD/BRAIN W/O DYE: CPT | Mod: 26

## 2019-01-30 PROCEDURE — 99024 POSTOP FOLLOW-UP VISIT: CPT

## 2019-01-30 PROCEDURE — 70450 CT HEAD/BRAIN W/O DYE: CPT

## 2019-01-31 ENCOUNTER — OUTPATIENT (OUTPATIENT)
Dept: OUTPATIENT SERVICES | Facility: HOSPITAL | Age: 84
LOS: 1 days | Discharge: HOME | End: 2019-01-31

## 2019-01-31 DIAGNOSIS — Z90.5 ACQUIRED ABSENCE OF KIDNEY: Chronic | ICD-10-CM

## 2019-01-31 DIAGNOSIS — D61.01 CONSTITUTIONAL (PURE) RED BLOOD CELL APLASIA: ICD-10-CM

## 2019-02-04 ENCOUNTER — OUTPATIENT (OUTPATIENT)
Dept: INPATIENT UNIT | Facility: HOSPITAL | Age: 84
LOS: 1 days | End: 2019-02-04
Payer: COMMERCIAL

## 2019-02-04 ENCOUNTER — FORM ENCOUNTER (OUTPATIENT)
Age: 84
End: 2019-02-04

## 2019-02-04 VITALS
OXYGEN SATURATION: 95 % | TEMPERATURE: 98 F | RESPIRATION RATE: 18 BRPM | HEART RATE: 92 BPM | SYSTOLIC BLOOD PRESSURE: 142 MMHG | DIASTOLIC BLOOD PRESSURE: 81 MMHG

## 2019-02-04 DIAGNOSIS — C79.31 SECONDARY MALIGNANT NEOPLASM OF BRAIN: ICD-10-CM

## 2019-02-04 DIAGNOSIS — Z90.5 ACQUIRED ABSENCE OF KIDNEY: Chronic | ICD-10-CM

## 2019-02-04 LAB
ANION GAP SERPL CALC-SCNC: 11 MMOL/L — SIGNIFICANT CHANGE UP (ref 5–17)
APTT BLD: 27.5 SEC — SIGNIFICANT CHANGE UP (ref 27.5–36.3)
BASOPHILS # BLD AUTO: 0 K/UL — SIGNIFICANT CHANGE UP (ref 0–0.2)
BASOPHILS NFR BLD AUTO: 0.4 % — SIGNIFICANT CHANGE UP (ref 0–2)
BLD GP AB SCN SERPL QL: NEGATIVE — SIGNIFICANT CHANGE UP
BUN SERPL-MCNC: 13 MG/DL — SIGNIFICANT CHANGE UP (ref 7–23)
CALCIUM SERPL-MCNC: 8.7 MG/DL — SIGNIFICANT CHANGE UP (ref 8.4–10.5)
CHLORIDE SERPL-SCNC: 107 MMOL/L — SIGNIFICANT CHANGE UP (ref 96–108)
CO2 SERPL-SCNC: 24 MMOL/L — SIGNIFICANT CHANGE UP (ref 22–31)
CREAT SERPL-MCNC: 0.77 MG/DL — SIGNIFICANT CHANGE UP (ref 0.5–1.3)
EOSINOPHIL # BLD AUTO: 0.1 K/UL — SIGNIFICANT CHANGE UP (ref 0–0.5)
EOSINOPHIL NFR BLD AUTO: 1.7 % — SIGNIFICANT CHANGE UP (ref 0–6)
GLUCOSE SERPL-MCNC: 84 MG/DL — SIGNIFICANT CHANGE UP (ref 70–99)
HCT VFR BLD CALC: 32.5 % — LOW (ref 34.5–45)
HGB BLD-MCNC: 10 G/DL — LOW (ref 11.5–15.5)
INR BLD: 0.99 RATIO — SIGNIFICANT CHANGE UP (ref 0.88–1.16)
LYMPHOCYTES # BLD AUTO: 1.8 K/UL — SIGNIFICANT CHANGE UP (ref 1–3.3)
LYMPHOCYTES # BLD AUTO: 29.3 % — SIGNIFICANT CHANGE UP (ref 13–44)
MCHC RBC-ENTMCNC: 22.7 PG — LOW (ref 27–34)
MCHC RBC-ENTMCNC: 30.9 GM/DL — LOW (ref 32–36)
MCV RBC AUTO: 73.5 FL — LOW (ref 80–100)
MONOCYTES # BLD AUTO: 0.6 K/UL — SIGNIFICANT CHANGE UP (ref 0–0.9)
MONOCYTES NFR BLD AUTO: 10.1 % — SIGNIFICANT CHANGE UP (ref 2–14)
NEUTROPHILS # BLD AUTO: 3.6 K/UL — SIGNIFICANT CHANGE UP (ref 1.8–7.4)
NEUTROPHILS NFR BLD AUTO: 58.5 % — SIGNIFICANT CHANGE UP (ref 43–77)
PLATELET # BLD AUTO: 333 K/UL — SIGNIFICANT CHANGE UP (ref 150–400)
POTASSIUM SERPL-MCNC: 4.7 MMOL/L — SIGNIFICANT CHANGE UP (ref 3.5–5.3)
POTASSIUM SERPL-SCNC: 4.7 MMOL/L — SIGNIFICANT CHANGE UP (ref 3.5–5.3)
PROTHROM AB SERPL-ACNC: 11.4 SEC — SIGNIFICANT CHANGE UP (ref 10–12.9)
RBC # BLD: 4.42 M/UL — SIGNIFICANT CHANGE UP (ref 3.8–5.2)
RBC # FLD: 17.1 % — HIGH (ref 10.3–14.5)
RH IG SCN BLD-IMP: POSITIVE — SIGNIFICANT CHANGE UP
SODIUM SERPL-SCNC: 142 MMOL/L — SIGNIFICANT CHANGE UP (ref 135–145)
WBC # BLD: 6.1 K/UL — SIGNIFICANT CHANGE UP (ref 3.8–10.5)
WBC # FLD AUTO: 6.1 K/UL — SIGNIFICANT CHANGE UP (ref 3.8–10.5)

## 2019-02-04 PROCEDURE — 85027 COMPLETE CBC AUTOMATED: CPT

## 2019-02-04 PROCEDURE — 70450 CT HEAD/BRAIN W/O DYE: CPT

## 2019-02-04 PROCEDURE — 86900 BLOOD TYPING SEROLOGIC ABO: CPT

## 2019-02-04 PROCEDURE — 86901 BLOOD TYPING SEROLOGIC RH(D): CPT

## 2019-02-04 PROCEDURE — 85610 PROTHROMBIN TIME: CPT

## 2019-02-04 PROCEDURE — 86850 RBC ANTIBODY SCREEN: CPT

## 2019-02-04 PROCEDURE — 85730 THROMBOPLASTIN TIME PARTIAL: CPT

## 2019-02-04 PROCEDURE — 80048 BASIC METABOLIC PNL TOTAL CA: CPT

## 2019-02-04 NOTE — H&P ADULT - ATTENDING COMMENTS
The patient hs an occluded left cyst reservoir catheter. The cyst has reaccumulated and she has symptoms of dragging her right leg and related gait difficulty. Plan: replacement of left cyst catheter. The expectation is that as the metastatic tumor causing the cyst responds to stereotoactic radiosurgery (done 1/11/19), the cyst itself will stay collapsed. I discussed the risks, benefits, and alternatives to surgery with the patient and her daughter and answered all questions.

## 2019-02-04 NOTE — H&P ADULT - HISTORY OF PRESENT ILLNESS
Ms Felix is a pleasant 83-year old woman w/ renal cell carcinoma who recently underwent placement of an Ommaya reservoir into a cystic brain metastasis. She had cyst drainage, SRS, and additional cyst drainage via the reservoir. On attempted drainage last week for increased RLE numbness only 3 cc were able to be aspirated, even though approximately 30+ cc were in the cyst based on CT.     She presents for revision of the reservoir.    Unfortunately she missed her PST appt and didn't realize she wasn't supposed to eat this AM. Her surgery will be delayed until tomorrow AM.    Exam:   AAOx3  FC  PERRL EOMI  NO FACIAL ASYMMETRY   NAVAS 5/5 EXCEPT R PROXIMAL LE 4++/5  DECREASED SENSATION RLE, RUE  NO HOFFMANS

## 2019-02-04 NOTE — H&P ADULT - ASSESSMENT
83-year old woman w. metastatic RCC, in need of revision of Ommaya reservoir and drainage of cystic brain lesion.  -Preop for TOMORROW AM  -Labs  -Stereo CTH   -Discussed w/ Dr. Thayer and Dr. Duenas

## 2019-02-04 NOTE — CHART NOTE - NSCHARTNOTEFT_GEN_A_CORE
Patient surgery rescheduled to 8:30 AM tomorrow  May be discharged from [facility] after CTH, labs  NPO after midnight

## 2019-02-04 NOTE — PRE-ANESTHESIA EVALUATION ADULT - NSANTHOSAYNRD_GEN_A_CORE
No. HARRIET screening performed.  STOP BANG Legend: 0-2 = LOW Risk; 3-4 = INTERMEDIATE Risk; 5-8 = HIGH Risk

## 2019-02-05 ENCOUNTER — RESULT REVIEW (OUTPATIENT)
Age: 84
End: 2019-02-05

## 2019-02-05 ENCOUNTER — APPOINTMENT (OUTPATIENT)
Dept: NEUROSURGERY | Facility: HOSPITAL | Age: 84
End: 2019-02-05

## 2019-02-05 ENCOUNTER — OUTPATIENT (OUTPATIENT)
Dept: OUTPATIENT SERVICES | Facility: HOSPITAL | Age: 84
LOS: 1 days | End: 2019-02-05
Payer: MEDICARE

## 2019-02-05 VITALS
HEART RATE: 92 BPM | SYSTOLIC BLOOD PRESSURE: 120 MMHG | DIASTOLIC BLOOD PRESSURE: 76 MMHG | HEIGHT: 58 IN | TEMPERATURE: 98 F | RESPIRATION RATE: 18 BRPM | WEIGHT: 145.95 LBS | OXYGEN SATURATION: 95 %

## 2019-02-05 VITALS
HEART RATE: 87 BPM | SYSTOLIC BLOOD PRESSURE: 117 MMHG | OXYGEN SATURATION: 99 % | DIASTOLIC BLOOD PRESSURE: 61 MMHG | TEMPERATURE: 98 F | RESPIRATION RATE: 20 BRPM

## 2019-02-05 DIAGNOSIS — C79.31 SECONDARY MALIGNANT NEOPLASM OF BRAIN: ICD-10-CM

## 2019-02-05 DIAGNOSIS — Z90.5 ACQUIRED ABSENCE OF KIDNEY: Chronic | ICD-10-CM

## 2019-02-05 PROCEDURE — 61215 INS SUBQ RSVR PMP/NFS SYS: CPT | Mod: 78,59

## 2019-02-05 PROCEDURE — 88305 TISSUE EXAM BY PATHOLOGIST: CPT | Mod: 26

## 2019-02-05 PROCEDURE — 88342 IMHCHEM/IMCYTCHM 1ST ANTB: CPT | Mod: 26

## 2019-02-05 PROCEDURE — 70450 CT HEAD/BRAIN W/O DYE: CPT

## 2019-02-05 PROCEDURE — 61781 SCAN PROC CRANIAL INTRA: CPT | Mod: 78

## 2019-02-05 PROCEDURE — 88341 IMHCHEM/IMCYTCHM EA ADD ANTB: CPT

## 2019-02-05 PROCEDURE — 88305 TISSUE EXAM BY PATHOLOGIST: CPT

## 2019-02-05 PROCEDURE — 61210 BURR HOLE IMPLT VENTR CATH: CPT | Mod: 78

## 2019-02-05 PROCEDURE — 70450 CT HEAD/BRAIN W/O DYE: CPT | Mod: 26

## 2019-02-05 PROCEDURE — 61210 BURR HOLE IMPLT VENTR CATH: CPT

## 2019-02-05 PROCEDURE — C1889: CPT

## 2019-02-05 RX ORDER — PANTOPRAZOLE SODIUM 20 MG/1
40 TABLET, DELAYED RELEASE ORAL
Qty: 0 | Refills: 0 | Status: DISCONTINUED | OUTPATIENT
Start: 2019-02-05 | End: 2019-02-20

## 2019-02-05 RX ORDER — ONDANSETRON 8 MG/1
4 TABLET, FILM COATED ORAL
Qty: 0 | Refills: 0 | Status: DISCONTINUED | OUTPATIENT
Start: 2019-02-05 | End: 2019-02-05

## 2019-02-05 RX ORDER — ACETAMINOPHEN 500 MG
1000 TABLET ORAL ONCE
Qty: 0 | Refills: 0 | Status: COMPLETED | OUTPATIENT
Start: 2019-02-05 | End: 2019-02-05

## 2019-02-05 RX ORDER — OXYCODONE HYDROCHLORIDE 5 MG/1
1 TABLET ORAL
Qty: 4 | Refills: 0 | OUTPATIENT
Start: 2019-02-05

## 2019-02-05 RX ORDER — LEVOTHYROXINE SODIUM 125 MCG
88 TABLET ORAL DAILY
Qty: 0 | Refills: 0 | Status: DISCONTINUED | OUTPATIENT
Start: 2019-02-05 | End: 2019-02-20

## 2019-02-05 RX ORDER — LEVETIRACETAM 250 MG/1
500 TABLET, FILM COATED ORAL
Qty: 0 | Refills: 0 | Status: DISCONTINUED | OUTPATIENT
Start: 2019-02-05 | End: 2019-02-20

## 2019-02-05 RX ORDER — OXYCODONE HYDROCHLORIDE 5 MG/1
5 TABLET ORAL EVERY 6 HOURS
Qty: 0 | Refills: 0 | Status: DISCONTINUED | OUTPATIENT
Start: 2019-02-05 | End: 2019-02-05

## 2019-02-05 RX ORDER — HYDROMORPHONE HYDROCHLORIDE 2 MG/ML
0.5 INJECTION INTRAMUSCULAR; INTRAVENOUS; SUBCUTANEOUS
Qty: 0 | Refills: 0 | Status: DISCONTINUED | OUTPATIENT
Start: 2019-02-05 | End: 2019-02-05

## 2019-02-05 RX ORDER — CEPHALEXIN 500 MG
1 CAPSULE ORAL
Qty: 12 | Refills: 0 | OUTPATIENT
Start: 2019-02-05 | End: 2019-02-07

## 2019-02-05 RX ORDER — NAFCILLIN 10 G/100ML
1 INJECTION, POWDER, FOR SOLUTION INTRAVENOUS EVERY 4 HOURS
Qty: 0 | Refills: 0 | Status: DISCONTINUED | OUTPATIENT
Start: 2019-02-05 | End: 2019-02-20

## 2019-02-05 RX ORDER — SODIUM CHLORIDE 9 MG/ML
1000 INJECTION INTRAMUSCULAR; INTRAVENOUS; SUBCUTANEOUS
Qty: 0 | Refills: 0 | Status: DISCONTINUED | OUTPATIENT
Start: 2019-02-05 | End: 2019-02-20

## 2019-02-05 RX ORDER — VERAPAMIL HCL 240 MG
120 CAPSULE, EXTENDED RELEASE PELLETS 24 HR ORAL DAILY
Qty: 0 | Refills: 0 | Status: DISCONTINUED | OUTPATIENT
Start: 2019-02-05 | End: 2019-02-05

## 2019-02-05 RX ADMIN — NAFCILLIN 100 GRAM(S): 10 INJECTION, POWDER, FOR SOLUTION INTRAVENOUS at 16:32

## 2019-02-05 RX ADMIN — SODIUM CHLORIDE 75 MILLILITER(S): 9 INJECTION INTRAMUSCULAR; INTRAVENOUS; SUBCUTANEOUS at 12:14

## 2019-02-05 RX ADMIN — NAFCILLIN 100 GRAM(S): 10 INJECTION, POWDER, FOR SOLUTION INTRAVENOUS at 13:10

## 2019-02-05 RX ADMIN — Medication 1000 MILLIGRAM(S): at 16:35

## 2019-02-05 RX ADMIN — Medication 400 MILLIGRAM(S): at 16:07

## 2019-02-05 NOTE — ASU DISCHARGE PLAN (ADULT/PEDIATRIC). - MEDICATION SUMMARY - MEDICATIONS TO STOP TAKING
I will STOP taking the medications listed below when I get home from the hospital:    dexamethasone 2 mg oral tablet  -- 1 tab(s) by mouth 2 times a day

## 2019-02-05 NOTE — ASU DISCHARGE PLAN (ADULT/PEDIATRIC). - MEDICATION SUMMARY - MEDICATIONS TO TAKE
I will START or STAY ON the medications listed below when I get home from the hospital:    oxyCODONE 5 mg oral tablet  -- 1 tab(s) by mouth every 6 hours, As needed, Moderate to severe Pain (4 - 6) MDD:4  -- Indication: For Postoperative pain    verapamil 120 mg/24 hours oral capsule, extended release  -- 1 cap(s) by mouth once a day  -- Indication: For High blood pressure    levETIRAcetam 500 mg oral tablet  -- 1 tab(s) by mouth 2 times a day  -- Indication: For seizure prevention    Keflex 500 mg oral capsule  -- 1 cap(s) by mouth 4 times a day (before meals and at bedtime)   -- Finish all this medication unless otherwise directed by prescriber.    -- Indication: For Postoperative antibiotic    pantoprazole 40 mg oral delayed release tablet  -- 1 tab(s) by mouth once a day   -- Indication: For GI Protection    Synthroid 88 mcg (0.088 mg) oral tablet  -- 1 tab(s) by mouth once a day  -- Indication: For Thyroid

## 2019-02-05 NOTE — ASU DISCHARGE PLAN (ADULT/PEDIATRIC). - SPECIAL INSTRUCTIONS
Please call Dr. Thayer's office for follow up.  [phone]    Finish the several doses of antibiotics as prescribed.  Use bacitracin and Xeroform to cover the incision until you see Dr. Thayer.   You say shower 4 days after surgery (Saturday).  Do not scrub the incision site.  Call Dr. Thayer if you have any excessive pain from the incision site, any discharge from it, or any fevers.

## 2019-02-05 NOTE — BRIEF OPERATIVE NOTE - PROCEDURE
<<-----Click on this checkbox to enter Procedure [facility]  02/05/2019    Active  [id number]

## 2019-02-06 ENCOUNTER — APPOINTMENT (OUTPATIENT)
Dept: HEMATOLOGY ONCOLOGY | Facility: CLINIC | Age: 84
End: 2019-02-06

## 2019-02-07 ENCOUNTER — APPOINTMENT (OUTPATIENT)
Dept: NEUROLOGY | Facility: CLINIC | Age: 84
End: 2019-02-07

## 2019-02-07 ENCOUNTER — APPOINTMENT (OUTPATIENT)
Dept: MRI IMAGING | Facility: IMAGING CENTER | Age: 84
End: 2019-02-07

## 2019-02-14 LAB — SURGICAL PATHOLOGY STUDY: SIGNIFICANT CHANGE UP

## 2019-02-15 ENCOUNTER — INPATIENT (INPATIENT)
Facility: HOSPITAL | Age: 84
LOS: 6 days | Discharge: HOME | DRG: 25 | End: 2019-02-22
Attending: NEUROLOGICAL SURGERY | Admitting: NEUROLOGICAL SURGERY
Payer: MEDICARE

## 2019-02-15 VITALS
TEMPERATURE: 98 F | OXYGEN SATURATION: 98 % | HEART RATE: 96 BPM | WEIGHT: 143.08 LBS | HEIGHT: 58 IN | SYSTOLIC BLOOD PRESSURE: 146 MMHG | RESPIRATION RATE: 17 BRPM | DIASTOLIC BLOOD PRESSURE: 73 MMHG

## 2019-02-15 DIAGNOSIS — Z90.5 ACQUIRED ABSENCE OF KIDNEY: Chronic | ICD-10-CM

## 2019-02-15 DIAGNOSIS — R29.898 OTHER SYMPTOMS AND SIGNS INVOLVING THE MUSCULOSKELETAL SYSTEM: ICD-10-CM

## 2019-02-15 LAB
ALBUMIN SERPL ELPH-MCNC: 3.7 G/DL — SIGNIFICANT CHANGE UP (ref 3.3–5)
ALP SERPL-CCNC: 91 U/L — SIGNIFICANT CHANGE UP (ref 40–120)
ALT FLD-CCNC: 13 U/L — SIGNIFICANT CHANGE UP (ref 10–45)
ANION GAP SERPL CALC-SCNC: 12 MMOL/L — SIGNIFICANT CHANGE UP (ref 5–17)
APTT BLD: 27.2 SEC — LOW (ref 27.5–36.3)
AST SERPL-CCNC: 15 U/L — SIGNIFICANT CHANGE UP (ref 10–40)
BILIRUB SERPL-MCNC: 0.3 MG/DL — SIGNIFICANT CHANGE UP (ref 0.2–1.2)
BLD GP AB SCN SERPL QL: NEGATIVE — SIGNIFICANT CHANGE UP
BUN SERPL-MCNC: 13 MG/DL — SIGNIFICANT CHANGE UP (ref 7–23)
CALCIUM SERPL-MCNC: 9.2 MG/DL — SIGNIFICANT CHANGE UP (ref 8.4–10.5)
CHLORIDE SERPL-SCNC: 104 MMOL/L — SIGNIFICANT CHANGE UP (ref 96–108)
CO2 SERPL-SCNC: 22 MMOL/L — SIGNIFICANT CHANGE UP (ref 22–31)
CREAT SERPL-MCNC: 0.79 MG/DL — SIGNIFICANT CHANGE UP (ref 0.5–1.3)
GLUCOSE SERPL-MCNC: 113 MG/DL — HIGH (ref 70–99)
HCT VFR BLD CALC: 29.3 % — LOW (ref 34.5–45)
HGB BLD-MCNC: 9.2 G/DL — LOW (ref 11.5–15.5)
INR BLD: 1.03 RATIO — SIGNIFICANT CHANGE UP (ref 0.88–1.16)
MCHC RBC-ENTMCNC: 22.2 PG — LOW (ref 27–34)
MCHC RBC-ENTMCNC: 31.3 GM/DL — LOW (ref 32–36)
MCV RBC AUTO: 71 FL — LOW (ref 80–100)
PLATELET # BLD AUTO: 323 K/UL — SIGNIFICANT CHANGE UP (ref 150–400)
POTASSIUM SERPL-MCNC: 4.2 MMOL/L — SIGNIFICANT CHANGE UP (ref 3.5–5.3)
POTASSIUM SERPL-SCNC: 4.2 MMOL/L — SIGNIFICANT CHANGE UP (ref 3.5–5.3)
PROT SERPL-MCNC: 7.3 G/DL — SIGNIFICANT CHANGE UP (ref 6–8.3)
PROTHROM AB SERPL-ACNC: 11.7 SEC — SIGNIFICANT CHANGE UP (ref 10–12.9)
RBC # BLD: 4.13 M/UL — SIGNIFICANT CHANGE UP (ref 3.8–5.2)
RBC # FLD: 17.5 % — HIGH (ref 10.3–14.5)
RH IG SCN BLD-IMP: POSITIVE — SIGNIFICANT CHANGE UP
SODIUM SERPL-SCNC: 138 MMOL/L — SIGNIFICANT CHANGE UP (ref 135–145)
WBC # BLD: 8.6 K/UL — SIGNIFICANT CHANGE UP (ref 3.8–10.5)
WBC # FLD AUTO: 8.6 K/UL — SIGNIFICANT CHANGE UP (ref 3.8–10.5)

## 2019-02-15 PROCEDURE — 75809 NONVASCULAR SHUNT X-RAY: CPT | Mod: 26

## 2019-02-15 PROCEDURE — 99285 EMERGENCY DEPT VISIT HI MDM: CPT

## 2019-02-15 PROCEDURE — 70450 CT HEAD/BRAIN W/O DYE: CPT | Mod: 26

## 2019-02-15 PROCEDURE — ZZZZZ: CPT

## 2019-02-15 RX ORDER — OXYCODONE HYDROCHLORIDE 5 MG/1
5 TABLET ORAL EVERY 6 HOURS
Qty: 0 | Refills: 0 | Status: DISCONTINUED | OUTPATIENT
Start: 2019-02-15 | End: 2019-02-18

## 2019-02-15 RX ORDER — LEVOTHYROXINE SODIUM 125 MCG
88 TABLET ORAL DAILY
Qty: 0 | Refills: 0 | Status: DISCONTINUED | OUTPATIENT
Start: 2019-02-15 | End: 2019-02-18

## 2019-02-15 RX ORDER — ACETAMINOPHEN 500 MG
650 TABLET ORAL EVERY 6 HOURS
Qty: 0 | Refills: 0 | Status: DISCONTINUED | OUTPATIENT
Start: 2019-02-15 | End: 2019-02-18

## 2019-02-15 RX ORDER — DOCUSATE SODIUM 100 MG
100 CAPSULE ORAL THREE TIMES A DAY
Qty: 0 | Refills: 0 | Status: DISCONTINUED | OUTPATIENT
Start: 2019-02-15 | End: 2019-02-18

## 2019-02-15 RX ORDER — LEVETIRACETAM 250 MG/1
500 TABLET, FILM COATED ORAL EVERY 12 HOURS
Qty: 0 | Refills: 0 | Status: DISCONTINUED | OUTPATIENT
Start: 2019-02-15 | End: 2019-02-18

## 2019-02-15 RX ORDER — ONDANSETRON 8 MG/1
4 TABLET, FILM COATED ORAL EVERY 6 HOURS
Qty: 0 | Refills: 0 | Status: DISCONTINUED | OUTPATIENT
Start: 2019-02-15 | End: 2019-02-18

## 2019-02-15 RX ORDER — VERAPAMIL HCL 240 MG
120 CAPSULE, EXTENDED RELEASE PELLETS 24 HR ORAL DAILY
Qty: 0 | Refills: 0 | Status: DISCONTINUED | OUTPATIENT
Start: 2019-02-15 | End: 2019-02-16

## 2019-02-15 RX ORDER — SENNA PLUS 8.6 MG/1
2 TABLET ORAL AT BEDTIME
Qty: 0 | Refills: 0 | Status: DISCONTINUED | OUTPATIENT
Start: 2019-02-15 | End: 2019-02-18

## 2019-02-15 RX ORDER — PANTOPRAZOLE SODIUM 20 MG/1
40 TABLET, DELAYED RELEASE ORAL
Qty: 0 | Refills: 0 | Status: DISCONTINUED | OUTPATIENT
Start: 2019-02-15 | End: 2019-02-18

## 2019-02-15 RX ORDER — DEXAMETHASONE 0.5 MG/5ML
4 ELIXIR ORAL EVERY 6 HOURS
Qty: 0 | Refills: 0 | Status: DISCONTINUED | OUTPATIENT
Start: 2019-02-15 | End: 2019-02-18

## 2019-02-15 NOTE — ED PROVIDER NOTE - PHYSICAL EXAMINATION
Yola Vergara, DO:   Gen: Well appearing, NAD  Head: NCAT  HEENT: PERRL, MMM, normal conjunctiva, anicteric, neck supple  Lung: CTAB, no adventitious sounds  CV: RRR, no murmurs  Abd: soft, NTND, no rebound or guarding, no CVAT  MSK: No edema, no visible deformities  Neuro: CN II-XII grossly intact. 5/5 strength and normal sensation in all extremities. Ambulatory with stable gait.   Skin: Warm and dry, no evidence of rash  Psych: normal mood and affect Yola Vergara, DO:   Gen: Well appearing, NAD  Head: NCAT  HEENT: PERRL, MMM, normal conjunctiva, anicteric, neck supple  Lung: CTAB, no adventitious sounds  CV: RRR, no murmurs  Abd: soft, NTND, no rebound or guarding, no CVAT  MSK: No edema, no visible deformities  Neuro: CN II-XII grossly intact. 5/5 strength and normal sensation in all extremities. Ambulatory with stable gait.   Skin: Warm and dry, no evidence of rash  Psych: normal mood and affect  **ATTENDING ADDENDUM (Dr. Kevin Napoles): I have reviewed and substantially contributed to the elements of the PE as documented above. I have directly performed an examination of this patient in conjunction with the other members (EM resident/PA/NP) of the patient care team.

## 2019-02-15 NOTE — ED PROVIDER NOTE - OBJECTIVE STATEMENT
Yola Vergara, DO: 83-year old woman w/ 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 No fevers, nausea/vomiting, visual changes, confusion, chest pain, shortness of breath. Yola Vergara, DO: 83-year old woman w/ 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 No fevers, nausea/vomiting, visual changes, confusion, chest pain, shortness of breath.  **ATTENDING ADDENDUM (Dr. Kevin Napoles): I attest that I have directly and personally interviewed and examined this patient and elicited a comparable history of present illness and review of systems as documented, along with my EM resident. I attest that I have made significant contributions to the documentation where necessary and as noted in the EMR.

## 2019-02-15 NOTE — ED ADULT TRIAGE NOTE - CCCP TRG CHIEF CMPLNT
shunt placed 11 dyas ago now having headaches and right sided numbness  shunt placed 11 days ago now having headaches and right sided numbness

## 2019-02-15 NOTE — ED PROVIDER NOTE - CONTEXT
known (describe)/**ATTENDING ADDENDUM (Dr. Kevin Napoles): recent history of same, with known metastatic brain cancer (primary: renal cell cancer) s/p nephrectomy s/p Ommaya reservoir/shunt placement.

## 2019-02-15 NOTE — ED PROVIDER NOTE - ATTENDING CONTRIBUTION TO CARE
**ATTENDING ADDENDUM (Dr. Kevin Napoles): I attest that I have directly examined this patient and reviewed and formulated the diagnostic and therapeutic management plan in collaboration with the EM resident. Please see MDM note and remainder of EMR for findings from CC, HPI, ROS, and PE. (Libov)

## 2019-02-15 NOTE — ED PROVIDER NOTE - CONSULTANT FREE TEXT FOR MDM DISCUSSED CASE WITH QUESTION
Gus (NS resident) (consultation and discussion throughout ED course) JACEY Reynoso (NS resident) (consultation and discussion throughout ED course)

## 2019-02-15 NOTE — ED PROVIDER NOTE - CHPI ED SYMPTOMS POS
HEADACHE/**ATTENDING ADDENDUM (Dr. Kevni Napoles): numbness and weakness reported in the right upper and lower extremities.

## 2019-02-15 NOTE — ED PROVIDER NOTE - CLINICAL SUMMARY MEDICAL DECISION MAKING FREE TEXT BOX
VPS requiring revision. Evaluated by NSGY. Admit to Dr. Thayer. Malfunctioning Onmaya reservoir. Evaluated by NSGY. Admit to Dr. Thayer. Malfunctioning Onmaya reservoir. Evaluated by NSGY. Admit to Dr. Thayer.  **ATTENDING MEDICAL DECISION MAKING/SYNTHESIS (Dr. Kevin Napoles): I have reviewed the Chief Complaint, the HPI, the ROS, and have directly performed and confirmed the findings on the Physical Examination. I have reviewed the medical decision making with all providers, as applicable. 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 Patient's primary care physician/provider neurosurgeon: Jeovany.   The MOST LIKELY DIAGNOSIS, and the LIST OF DIFFERENTIAL DIAGNOSES, includes (but is not limited to) the following: elevated ICP, /Ommaya shunt malfunction, worsening intracranial metastatic tumor burden (considered), serious bacterial infection or sepsis/severe sepsis e.g. meningitis (viral or bacterial), encephalitis, or equivalent, emergent sequela of elevated ICP e.g. seizure, respiratory depression, altered mental status, or equivalent requiring airway protection or emergent surgery (NO evidence), electrolyte-metabolic-endocrine derangements, headache NOS (unlikely given presentation and clinical findings), intracerebral hemorrhage (considered, unlikely), cerebrovascular accident or transient ischemic attack (unlikely given presentation and clinical findings). The likelihood of each of these diagnoses has been appropriately considered in the context of this patient's presentation and my evaluation. PLAN: as described in EMR, including diagnostics, therapeutics and consultation as clinically warranted. I will continue to reevaluate the patient, including the results of all testing, and monitor response to therapy throughout the patient's course in the ED. Malfunctioning Onmaya reservoir. Evaluated by NSGY. Admit to Dr. Thayer.  **ATTENDING MEDICAL DECISION MAKING/SYNTHESIS (Dr. Kevin Napoles): I have reviewed the Chief Complaint, the HPI, the ROS, and have directly performed and confirmed the findings on the Physical Examination. I have reviewed the medical decision making with all providers, as applicable. The PROBLEM REPRESENTATION at this time is: 83-year-old woman with history of renal cell cancer s/p nephrectomy with brain metastasis s/p Ommaya reservoir placement re: elevated intracranial pressure s/p recent drainage of Ommaya catheter for intracranial cyst now presenting to the ED with recurrent headache and right-sided upper and lower extremity numbness/weakness consistent with prior episode (numbness worse than weakness). Patient's primary care physician/provider neurosurgeon: Jeovany. The MOST LIKELY DIAGNOSIS, and the LIST OF DIFFERENTIAL DIAGNOSES, includes (but is not limited to) the following: recurrent intracranial cyst, elevated ICP, /Ommaya shunt malfunction, cerebral edema, worsening intracranial metastatic tumor burden (considered), serious bacterial infection or sepsis/severe sepsis e.g. meningitis (viral or bacterial), encephalitis, or equivalent, emergent sequela of elevated ICP e.g. seizure, respiratory depression, altered mental status, or equivalent requiring airway protection or emergent surgery (NO evidence), electrolyte-metabolic-endocrine derangements, headache NOS (unlikely given presentation and clinical findings), intracerebral hemorrhage (considered, unlikely), cerebrovascular accident or transient ischemic attack (unlikely given presentation and clinical findings). The likelihood of each of these diagnoses has been appropriately considered in the context of this patient's presentation and my evaluation. PLAN: as described in EMR, including diagnostics, therapeutics and consultation as clinically warranted. I will continue to reevaluate the patient, including the results of all testing, and monitor response to therapy throughout the patient's course in the ED.

## 2019-02-15 NOTE — ED PROVIDER NOTE - PLAN OF CARE
**ATTENDING ADDENDUM (Dr. Kevin Napoles): Goals of care include resolution of emergent/urgent symptoms and concerns, and restoration to baseline level of homeostasis.

## 2019-02-15 NOTE — ED PROVIDER NOTE - DIAGNOSTIC INTERPRETATION
**ATTENDING ADDENDUM (Dr. Kevin Napoles): Radiographs reviewed. Pertinent findings include: increased cerebral edema from increased left posterior parietal cystic mass with NO evidence of shift or intracerebral hemorrhage. Uncertain if this represents Ommaya malfunction or other pathology.

## 2019-02-15 NOTE — H&P ADULT - HISTORY OF PRESENT ILLNESS
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 Ommaya exchange 11 days due to catheter blockage. Here now for RUE and RLE numbness x1 days w/ mild headache. No fevers, nausea/vomiting, visual changes, confusion, chest pain, shortness of breath. PAtient fell today because she was unable to "feel" her leg. 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 Ommaya exchange 11 days due to catheter blockage. Here now for RUE and RLE numbness x1 days w/ mild headache. No fevers, nausea/vomiting, visual changes, confusion, chest pain, shortness of breath. PAtient fell today because she was unable to "feel" her leg.  Ommaya attempted to be tapped today, however unable to withdraw significant cyst fluid    12/18/18 Ommaya placement  1/5/19 Gamma knife SRS  2/5/19 Ommaya revision

## 2019-02-15 NOTE — ED ADULT NURSE NOTE - OBJECTIVE STATEMENT
83 year old female presents to the ED through waiting room with Daughter and Son-in-law, complaining of decreased sensation in right leg and weakness in right upper and lower extremity. PMH of  shunt - replaced 11 days ago. Patient's neurologist advised patient to come to ED to have xray/ct of shunt. Pt. reports she has come to ED to have it drained several times and states she has never needed an IV, doesn't want it placed/labs drawn, states "put me in the morgue before you put me in the OR." VSS. Patient brought to xray. Patient undressed and placed into gown, and side rails up with bed in lowest position for safety. blanket provided. Comfort and safety provided.

## 2019-02-15 NOTE — ED ADULT TRIAGE NOTE - CHIEF COMPLAINT QUOTE
cyst on the brain,  shunt was placed 11 days ago. She is now having headaches and right sided numbness since last night, MD Thayer told them to come to ED

## 2019-02-15 NOTE — ED PROVIDER NOTE - MUSCULOSKELETAL MINIMAL EXAM
normal range of motion/no muscle tenderness/motor intact/**ATTENDING ADDENDUM (Dr. Kevin Napoles): NO obvious severe weakness demonstrated on physical examination of the bilateral upper and lower extremities./atraumatic

## 2019-02-15 NOTE — H&P ADULT - NSHPPHYSICALEXAM_GEN_ALL_CORE
AOx3, FC, PERRL, EOMI, no facial   5/5 throughout, no drift  Right sided dec senasation to light touch  no clonus

## 2019-02-15 NOTE — ED PROVIDER NOTE - ENMT, MLM
Airway patent, Nasal mucosa clear. Mouth with normal mucosa. Throat has no vesicles, no oropharyngeal exudates and uvula is midline. **ATTENDING ADDENDUM (Dr. Kevin Napoles): NO obvious droop.

## 2019-02-15 NOTE — ED PROVIDER NOTE - CARE PLAN
Principal Discharge DX:	Weakness of lower extremity, unspecified laterality Principal Discharge DX:	Cerebral edema  Goal:	**ATTENDING ADDENDUM (Dr. Kevin Napoles): Goals of care include resolution of emergent/urgent symptoms and concerns, and restoration to baseline level of homeostasis.  Secondary Diagnosis:	Metastatic renal cell carcinoma  Secondary Diagnosis:	History of nephrectomy  Secondary Diagnosis:	Ommaya reservoir malfunction

## 2019-02-15 NOTE — ED PROVIDER NOTE - RESPIRATORY, MLM
Breath sounds clear and equal bilaterally. **ATTENDING ADDENDUM (Dr. Kevin Napoles): NO wheezing, rales, rhonchi, crackles, stridor, drooling, retractions, nasal flaring, or tripoding.

## 2019-02-15 NOTE — ED PROVIDER NOTE - PROGRESS NOTE DETAILS
**ATTENDING ADDENDUM (Dr. Kevin Napoles): evaluated by ED team at time of initial evaluation. Discussed case with neurosurgery resident. Agree with goals/plan of ED care as described in EMR, including diagnostics, therapeutics and consultation as clinically warranted. Agrees with CT, then likely tap of Ommaya shunt, then repeat CT. Anticipatory guidance provided. Neurosurgery will evaluate patient in ED. Will continue to observe and monitor closely. **ATTENDING ADDENDUM (Dr. Kevin Napoles): patient serially evaluated throughout ED course. NO acute deterioration up to this time in the ED. Agree with admission to Neurosurgery service. NO drainage of Ommaya reservoir in ED. Admission for continued observation and therapeutic intensity (including definitive care). Will continue to observe and monitor closely until definitive placement is made. **ATTENDING ADDENDUM (Dr. Kevin Napoles): patient serially evaluated throughout ED course. NO acute deterioration up to this time in the ED. Agree with admission to Neurosurgery service. Agree with dexamethasone as recommended. NO drainage of Ommaya reservoir in ED. Admission for continued observation and therapeutic intensity (including definitive care). Will continue to observe and monitor closely until definitive placement is made.

## 2019-02-15 NOTE — ED PROVIDER NOTE - CHIEF COMPLAINT
The patient is a 83y Female complaining of The patient is a 83y Female complaining of headache in context of known metastatic brain disease s/p Ommaya placement s/p recent drainage.

## 2019-02-15 NOTE — ED ADULT NURSE NOTE - CHPI ED NUR SYMPTOMS NEG
no decreased eating/drinking/no dizziness/no nausea/no vomiting/no chills/no tingling/no fever/no pain

## 2019-02-15 NOTE — ED PROVIDER NOTE - EYES, MLM
**ATTENDING ADDENDUM (Dr. Kevin Napoles): Extraocular muscle movements intact. Clear corneas bilaterally, pupils equal and round. NO nystagmus.

## 2019-02-15 NOTE — ED PROVIDER NOTE - GASTROINTESTINAL, MLM
Abdomen soft, non-tender **ATTENDING ADDENDUM (Dr. Kevin Napoles): NO guarding, rebound, or rigidity. NO pulsatile or non-pulsatile masses. NO hernias. NO obvious hepatosplenomegaly.

## 2019-02-15 NOTE — ED PROVIDER NOTE - FAMILY DETAILS FREE TEXT FOR MDM ADDL HISTORY OBTAINED FROM QUESTION
**ATTENDING ADDENDUM (Dr. Kevin Napoles): family (patient's daughter) able to corroborate patient's CC, HPI and review of systems. Family member provided detail on patient's physicians and time course related to previous drainage of Ommaya reservoir.

## 2019-02-15 NOTE — H&P ADULT - ASSESSMENT
83F known brain mets 2/2 renal cell RCC here with recurrent cystic met s/p ommaya drainage with cyst recurrnece  - admit to nsgy  - PT  - dec 4q6  - preop for possible revision

## 2019-02-15 NOTE — ED PROVIDER NOTE - CHPI ED SYMPTOMS NEG
no confusion/no nausea/no blurred vision/no dizziness/**ATTENDING ADDENDUM (Dr. Kevin Napoles): NO chest pain, palpitations, shortness of breath, dyspnea on exertion, abdominal pain, syncope, or near-syncope. NO recent trauma./no vomiting/no change in level of consciousness/no fever

## 2019-02-16 LAB
BASOPHILS # BLD AUTO: 0 K/UL — SIGNIFICANT CHANGE UP (ref 0–0.2)
BASOPHILS NFR BLD AUTO: 0.6 % — SIGNIFICANT CHANGE UP (ref 0–2)
EOSINOPHIL # BLD AUTO: 0.3 K/UL — SIGNIFICANT CHANGE UP (ref 0–0.5)
EOSINOPHIL NFR BLD AUTO: 3.5 % — SIGNIFICANT CHANGE UP (ref 0–6)
LYMPHOCYTES # BLD AUTO: 2.6 K/UL — SIGNIFICANT CHANGE UP (ref 1–3.3)
LYMPHOCYTES # BLD AUTO: 29.9 % — SIGNIFICANT CHANGE UP (ref 13–44)
MONOCYTES # BLD AUTO: 0.8 K/UL — SIGNIFICANT CHANGE UP (ref 0–0.9)
MONOCYTES NFR BLD AUTO: 9.5 % — SIGNIFICANT CHANGE UP (ref 2–14)
NEUTROPHILS # BLD AUTO: 4.8 K/UL — SIGNIFICANT CHANGE UP (ref 1.8–7.4)
NEUTROPHILS NFR BLD AUTO: 56.5 % — SIGNIFICANT CHANGE UP (ref 43–77)

## 2019-02-16 RX ORDER — VERAPAMIL HCL 240 MG
120 CAPSULE, EXTENDED RELEASE PELLETS 24 HR ORAL DAILY
Qty: 0 | Refills: 0 | Status: DISCONTINUED | OUTPATIENT
Start: 2019-02-16 | End: 2019-02-18

## 2019-02-16 RX ADMIN — Medication 100 MILLIGRAM(S): at 12:43

## 2019-02-16 RX ADMIN — Medication 88 MICROGRAM(S): at 06:24

## 2019-02-16 RX ADMIN — LEVETIRACETAM 500 MILLIGRAM(S): 250 TABLET, FILM COATED ORAL at 06:24

## 2019-02-16 RX ADMIN — Medication 4 MILLIGRAM(S): at 08:02

## 2019-02-16 RX ADMIN — Medication 4 MILLIGRAM(S): at 21:49

## 2019-02-16 RX ADMIN — Medication 100 MILLIGRAM(S): at 21:49

## 2019-02-16 RX ADMIN — PANTOPRAZOLE SODIUM 40 MILLIGRAM(S): 20 TABLET, DELAYED RELEASE ORAL at 06:24

## 2019-02-16 RX ADMIN — Medication 4 MILLIGRAM(S): at 12:43

## 2019-02-16 RX ADMIN — Medication 100 MILLIGRAM(S): at 06:24

## 2019-02-16 RX ADMIN — LEVETIRACETAM 500 MILLIGRAM(S): 250 TABLET, FILM COATED ORAL at 17:42

## 2019-02-16 RX ADMIN — Medication 120 MILLIGRAM(S): at 06:25

## 2019-02-16 RX ADMIN — Medication 4 MILLIGRAM(S): at 02:34

## 2019-02-16 NOTE — PHYSICAL THERAPY INITIAL EVALUATION ADULT - PLANNED THERAPY INTERVENTIONS, PT EVAL
stair training: GOAL: Pt will negotiate up/down 8 steps with railing independently in 2 weeks/transfer training/balance training/bed mobility training/gait training

## 2019-02-16 NOTE — PHYSICAL THERAPY INITIAL EVALUATION ADULT - DISCHARGE DISPOSITION, PT EVAL
home w/ home PT/Home PT for home safety assessment, progressive ambulation to improve patient's functional independence within the home

## 2019-02-16 NOTE — PHYSICAL THERAPY INITIAL EVALUATION ADULT - PERTINENT HX OF CURRENT PROBLEM, REHAB EVAL
Pt. 83 year old female admitted 2-15-19 with brain tumor, right UE and right LE numbness and headache.  Pt. with history of RCC s/p Ommay into cystic brain metastasis with ~qweekly drainage. Ommaya exchange 11 days due to catheter blockage.

## 2019-02-16 NOTE — PROGRESS NOTE ADULT - SUBJECTIVE AND OBJECTIVE BOX
Patient Seen and Examined.     Overnight Events:   None    T(C): 36.3 (02-16-19 @ 13:34), Max: 37.1 (02-15-19 @ 21:29)  HR: 85 (02-16-19 @ 13:34) (82 - 97)  BP: 112/55 (02-16-19 @ 13:34) (101/55 - 146/73)  RR: 18 (02-16-19 @ 13:34) (16 - 18)  SpO2: 98% (02-16-19 @ 13:34) (93% - 98%)    Exam:   Awake, Alert, AOX3  PERRL, EOMI, Face equal, Tongue m/l  5/5 throughout, no drift  SILT    acetaminophen   Tablet .. 650 milliGRAM(s) Oral every 6 hours PRN  dexamethasone  Injectable 4 milliGRAM(s) IV Push every 6 hours  docusate sodium 100 milliGRAM(s) Oral three times a day  levETIRAcetam 500 milliGRAM(s) Oral every 12 hours  levothyroxine 88 MICROGram(s) Oral daily  ondansetron Injectable 4 milliGRAM(s) IV Push every 6 hours PRN  oxyCODONE    IR 5 milliGRAM(s) Oral every 6 hours PRN  pantoprazole    Tablet 40 milliGRAM(s) Oral before breakfast  senna 2 Tablet(s) Oral at bedtime PRN  verapamil  milliGRAM(s) Oral daily                            9.2    8.6   )-----------( 323      ( 15 Feb 2019 22:46 )             29.3     02-15    138  |  104  |  13  ----------------------------<  113<H>  4.2   |  22  |  0.79    Ca    9.2      15 Feb 2019 22:46    TPro  7.3  /  Alb  3.7  /  TBili  0.3  /  DBili  x   /  AST  15  /  ALT  13  /  AlkPhos  91  02-15    PT/INR - ( 15 Feb 2019 22:46 )   PT: 11.7 sec;   INR: 1.03 ratio         PTT - ( 15 Feb 2019 22:46 )  PTT:27.2 sec    Imaging:   < from: CT Head No Cont (02.15.19 @ 19:07) >  IMPRESSION:     Interval increase in size of left posterior parietal cystic mass and   surrounding edema with worsening sulcal and ventricular effacement as   described above.    No new significant intracranial hemorrhage, midline shift or   hydrocephalus.    < end of copied text >

## 2019-02-16 NOTE — PROGRESS NOTE ADULT - ASSESSMENT
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 Ommaya exchange 11 days due to catheter blockage. Here now for RUE and RLE numbness x1 days w/ mild headache. No fevers, nausea/vomiting, visual changes, confusion, chest pain, shortness of breath. PAtient fell today because she was unable to "feel" her leg.  Ommaya attempted to be tapped today, however unable to withdraw significant cyst fluid.    - Attempted ommaya tapping today, only able to withdraw 1 CC   - Discussed options regarding repeat ommaya placement vs. craniotomy, explained that given the fact that the ommaya had clogged again, she should discuss craniotomy with her family   - Tentatively pre-op for Monday   - Medical clearance pending

## 2019-02-17 ENCOUNTER — TRANSCRIPTION ENCOUNTER (OUTPATIENT)
Age: 84
End: 2019-02-17

## 2019-02-17 DIAGNOSIS — R29.898 OTHER SYMPTOMS AND SIGNS INVOLVING THE MUSCULOSKELETAL SYSTEM: ICD-10-CM

## 2019-02-17 LAB
ANION GAP SERPL CALC-SCNC: 12 MMOL/L — SIGNIFICANT CHANGE UP (ref 5–17)
APTT BLD: 24.4 SEC — LOW (ref 27.5–36.3)
BLD GP AB SCN SERPL QL: NEGATIVE — SIGNIFICANT CHANGE UP
BUN SERPL-MCNC: 19 MG/DL — SIGNIFICANT CHANGE UP (ref 7–23)
CALCIUM SERPL-MCNC: 8.6 MG/DL — SIGNIFICANT CHANGE UP (ref 8.4–10.5)
CHLORIDE SERPL-SCNC: 107 MMOL/L — SIGNIFICANT CHANGE UP (ref 96–108)
CO2 SERPL-SCNC: 21 MMOL/L — LOW (ref 22–31)
CREAT SERPL-MCNC: 0.76 MG/DL — SIGNIFICANT CHANGE UP (ref 0.5–1.3)
GLUCOSE BLDC GLUCOMTR-MCNC: 145 MG/DL — HIGH (ref 70–99)
GLUCOSE BLDC GLUCOMTR-MCNC: 273 MG/DL — HIGH (ref 70–99)
GLUCOSE SERPL-MCNC: 205 MG/DL — HIGH (ref 70–99)
HCT VFR BLD CALC: 27.1 % — LOW (ref 34.5–45)
HGB BLD-MCNC: 7.6 G/DL — LOW (ref 11.5–15.5)
INR BLD: 0.98 RATIO — SIGNIFICANT CHANGE UP (ref 0.88–1.16)
MCHC RBC-ENTMCNC: 20.7 PG — LOW (ref 27–34)
MCHC RBC-ENTMCNC: 28 GM/DL — LOW (ref 32–36)
MCV RBC AUTO: 73.6 FL — LOW (ref 80–100)
PLATELET # BLD AUTO: 291 K/UL — SIGNIFICANT CHANGE UP (ref 150–400)
POTASSIUM SERPL-MCNC: 4.6 MMOL/L — SIGNIFICANT CHANGE UP (ref 3.5–5.3)
POTASSIUM SERPL-SCNC: 4.6 MMOL/L — SIGNIFICANT CHANGE UP (ref 3.5–5.3)
PROTHROM AB SERPL-ACNC: 11.2 SEC — SIGNIFICANT CHANGE UP (ref 10–13.1)
RBC # BLD: 3.68 M/UL — LOW (ref 3.8–5.2)
RBC # FLD: 17.4 % — HIGH (ref 10.3–14.5)
RH IG SCN BLD-IMP: POSITIVE — SIGNIFICANT CHANGE UP
SODIUM SERPL-SCNC: 140 MMOL/L — SIGNIFICANT CHANGE UP (ref 135–145)
WBC # BLD: 5.91 K/UL — SIGNIFICANT CHANGE UP (ref 3.8–10.5)
WBC # FLD AUTO: 5.91 K/UL — SIGNIFICANT CHANGE UP (ref 3.8–10.5)

## 2019-02-17 PROCEDURE — 70553 MRI BRAIN STEM W/O & W/DYE: CPT | Mod: 26

## 2019-02-17 PROCEDURE — 37191 INS ENDOVAS VENA CAVA FILTR: CPT

## 2019-02-17 PROCEDURE — 99232 SBSQ HOSP IP/OBS MODERATE 35: CPT | Mod: 57

## 2019-02-17 PROCEDURE — 99223 1ST HOSP IP/OBS HIGH 75: CPT | Mod: GC

## 2019-02-17 PROCEDURE — 93010 ELECTROCARDIOGRAM REPORT: CPT

## 2019-02-17 PROCEDURE — 93970 EXTREMITY STUDY: CPT | Mod: 26

## 2019-02-17 PROCEDURE — 71275 CT ANGIOGRAPHY CHEST: CPT | Mod: 26

## 2019-02-17 RX ORDER — CHLORHEXIDINE GLUCONATE 213 G/1000ML
1 SOLUTION TOPICAL ONCE
Qty: 0 | Refills: 0 | Status: COMPLETED | OUTPATIENT
Start: 2019-02-17 | End: 2019-02-18

## 2019-02-17 RX ORDER — DEXTROSE MONOHYDRATE, SODIUM CHLORIDE, AND POTASSIUM CHLORIDE 50; .745; 4.5 G/1000ML; G/1000ML; G/1000ML
1000 INJECTION, SOLUTION INTRAVENOUS
Qty: 0 | Refills: 0 | Status: DISCONTINUED | OUTPATIENT
Start: 2019-02-17 | End: 2019-02-17

## 2019-02-17 RX ORDER — DEXTROSE MONOHYDRATE, SODIUM CHLORIDE, AND POTASSIUM CHLORIDE 50; .745; 4.5 G/1000ML; G/1000ML; G/1000ML
1000 INJECTION, SOLUTION INTRAVENOUS
Qty: 0 | Refills: 0 | Status: DISCONTINUED | OUTPATIENT
Start: 2019-02-17 | End: 2019-02-18

## 2019-02-17 RX ORDER — INSULIN LISPRO 100/ML
VIAL (ML) SUBCUTANEOUS
Qty: 0 | Refills: 0 | Status: DISCONTINUED | OUTPATIENT
Start: 2019-02-17 | End: 2019-02-18

## 2019-02-17 RX ADMIN — Medication 4 MILLIGRAM(S): at 03:51

## 2019-02-17 RX ADMIN — Medication 88 MICROGRAM(S): at 05:48

## 2019-02-17 RX ADMIN — LEVETIRACETAM 500 MILLIGRAM(S): 250 TABLET, FILM COATED ORAL at 05:48

## 2019-02-17 RX ADMIN — Medication 6: at 12:56

## 2019-02-17 RX ADMIN — Medication 4 MILLIGRAM(S): at 12:56

## 2019-02-17 RX ADMIN — LEVETIRACETAM 500 MILLIGRAM(S): 250 TABLET, FILM COATED ORAL at 17:19

## 2019-02-17 RX ADMIN — DEXTROSE MONOHYDRATE, SODIUM CHLORIDE, AND POTASSIUM CHLORIDE 75 MILLILITER(S): 50; .745; 4.5 INJECTION, SOLUTION INTRAVENOUS at 17:27

## 2019-02-17 RX ADMIN — PANTOPRAZOLE SODIUM 40 MILLIGRAM(S): 20 TABLET, DELAYED RELEASE ORAL at 05:48

## 2019-02-17 RX ADMIN — Medication 4 MILLIGRAM(S): at 09:08

## 2019-02-17 RX ADMIN — Medication 100 MILLIGRAM(S): at 05:48

## 2019-02-17 RX ADMIN — Medication 4 MILLIGRAM(S): at 22:06

## 2019-02-17 RX ADMIN — Medication 120 MILLIGRAM(S): at 05:49

## 2019-02-17 RX ADMIN — Medication 100 MILLIGRAM(S): at 12:56

## 2019-02-17 NOTE — CHART NOTE - NSCHARTNOTEFT_GEN_A_CORE
CAPRINI SCORE [CLOT] Score on Admission for     AGE RELATED RISK FACTORS                                                       MOBILITY RELATED FACTORS  [ ] Age 41-60 years                                            (1 Point)                  [ ] Bed rest                                                        (1 Point)  [ ] Age: 61-74 years                                           (2 Points)                 [ ] Plaster cast                                                   (2 Points)  [X ] Age= 75 years                                              (3 Points)                 [ ] Bed bound for more than 72 hours                 (2 Points)    DISEASE RELATED RISK FACTORS                                               GENDER SPECIFIC FACTORS  [ ] Edema in the lower extremities                       (1 Point)                  [ ] Pregnancy                                                     (1 Point)  [ ] Varicose veins                                               (1 Point)                  [ ] Post-partum < 6 weeks                                   (1 Point)             [ X] BMI > 25 Kg/m2                                            (1 Point)                  [ ] Hormonal therapy  or oral contraception          (1 Point)                 [ ] Sepsis (in the previous month)                        (1 Point)                  [ ] History of pregnancy complications                 (1 point)  [ ] Pneumonia or serious lung disease                                               [ ] Unexplained or recurrent                     (1 Point)           (in the previous month)                               (1 Point)  [ ] Abnormal pulmonary function test                     (1 Point)                 SURGERY RELATED RISK FACTORS (include planned surgeries)  [ ] Acute myocardial infarction                              (1 Point)                 [ ]  Section                                             (1 Point)  [ ] Congestive heart failure (in the previous month)  (1 Point)         [ ] Minor surgery                                                  (1 Point)   [ ] Inflammatory bowel disease                             (1 Point)                 [ ] Arthroscopic surgery                                        (2 Points)  [ ] Central venous access                                      (2 Points)                [ ] General surgery lasting more than 45 minutes   (2 Points)       [ ] Stroke (in the previous month)                          (5 Points)               [ ] Elective arthroplasty                                         (5 Points)            [X ] current or past malignancy                              (2 Points)                                                                                                       HEMATOLOGY RELATED FACTORS                                                 TRAUMA RELATED RISK FACTORS  [ ] Prior episodes of VTE                                     (3 Points)                [ ] Fracture of the hip, pelvis, or leg                       (5 Points)  [ ] Positive family history for VTE                         (3 Points)                 [ ] Acute spinal cord injury (in the previous month)  (5 Points)  [ ] Prothrombin 26878 A                                     (3 Points)                 [ ] Paralysis  (less than 1 month)                             (5 Points)  [ ] Factor V Leiden                                             (3 Points)                  [ ] Multiple Trauma within 1 month                        (5 Points)  [ ] Lupus anticoagulants                                     (3 Points)                                                           [ ] Anticardiolipin antibodies                               (3 Points)                                                       [ ] High homocysteine in the blood                      (3 Points)                                             [ ] Other congenital or acquired thrombophilia      (3 Points)                                                [ ] Heparin induced thrombocytopenia                  (3 Points)                                          Total Score [      6    ]    Risk:  Very low 0   Low 1 to 2   Moderate 3 to 4   High =5       VTE Prophylasix Recommednations:  [X] chemo prophylasix                                                                                   [ ] contraindicated _____________________    **** HIGH LIKELIHOOD DVT PRESENT ON ADMISSION  [ ] (please order LE dopplers within 24 hours of admission)

## 2019-02-17 NOTE — PROGRESS NOTE ADULT - SUBJECTIVE AND OBJECTIVE BOX
SUBJECTIVE: In chair, looks well. No complaints.    OVERNIGHT EVENTS: None    Vital Signs Last 24 Hrs  T(C): 36.3 (17 Feb 2019 08:09), Max: 36.6 (16 Feb 2019 16:32)  T(F): 97.4 (17 Feb 2019 08:09), Max: 97.8 (16 Feb 2019 16:32)  HR: 82 (17 Feb 2019 08:09) (81 - 85)  BP: 105/56 (17 Feb 2019 08:09) (105/56 - 129/69)  BP(mean): --  RR: 18 (17 Feb 2019 08:09) (18 - 18)  SpO2: 95% (17 Feb 2019 08:09) (92% - 98%)  IVF: [X ] IVL [ ] NS+K@   DIET: [X ] Regular [ ] CCD [ ] Renal [ ] Puree [ ] Dysphagia [ ] Tube Feeds:   PCA: [ ] YES [ X] NO   YANEZ: [ ] YES [X ] NO [X ] VOID   BM: [ ] YES [ X] NO     DRAINS: N/A    PHYSICAL EXAM:    General: No Acute Distress     Neurological: Awake, alert oriented to person, place and time, Following Commands, PERRL, EOMI, Face Symmetrical, Speech Fluent, Moving all extremities, Muscle Strength normal in all four extremities, No Drift, Sensation to Light Touch Intact    Pulmonary: Clear to Auscultation, No Rales, No Rhonchi, No Wheezes     Cardiovascular: S1, S2, Regular Rate and Rhythm     Gastrointestinal: Soft, Nontender, Nondistended     Incision: Ommaya site CDI, no collection     LABS:                        9.2    8.6   )-----------( 323      ( 15 Feb 2019 22:46 )             29.3    02-17    140  |  107  |  19  ----------------------------<  205<H>  4.6   |  21<L>  |  0.76    Ca    8.6      17 Feb 2019 06:51    TPro  7.3  /  Alb  3.7  /  TBili  0.3  /  DBili  x   /  AST  15  /  ALT  13  /  AlkPhos  91  02-15  PT/INR - ( 15 Feb 2019 22:46 )   PT: 11.7 sec;   INR: 1.03 ratio    PTT - ( 15 Feb 2019 22:46 )  PTT:27.2 sec    Type + Screen (02.17.19 @ 06:44)    ABO Interpretation: AB    Rh Interpretation: Positive    Antibody Screen: Negative    < from: 12 Lead ECG (01.29.19 @ 00:52) >    Ventricular Rate 93 BPM    Atrial Rate 93 BPM    P-R Interval 128 ms    QRS Duration 82 ms    Q-T Interval 356 ms    QTC Calculation(Bezet) 442 ms    P Axis 53 degrees    R Axis 35 degrees    T Axis 59 degrees    Diagnosis Line NORMAL SINUS RHYTHM  NORMAL ECG      02-16 @ 07:01  -  02-17 @ 07:00  --------------------------------------------------------  IN: 240 mL / OUT: 0 mL / NET: 240 mL    IMAGING: < from: CT Head No Cont (02.15.19 @ 19:07) >  Interval increase in size of left posterior parietal cystic mass and   surrounding edema with worsening sulcal and ventricular effacement as   described above.    No new significant intracranial hemorrhage, midline shift or   hydrocephalus.    MEDICATIONS  (STANDING):  dexamethasone  Injectable 4 milliGRAM(s) IV Push every 6 hours  docusate sodium 100 milliGRAM(s) Oral three times a day  levETIRAcetam 500 milliGRAM(s) Oral every 12 hours  levothyroxine 88 MICROGram(s) Oral daily  pantoprazole    Tablet 40 milliGRAM(s) Oral before breakfast  verapamil  milliGRAM(s) Oral daily    MEDICATIONS  (PRN):  acetaminophen   Tablet .. 650 milliGRAM(s) Oral every 6 hours PRN Temp greater or equal to 38C (100.4F), Mild Pain (1 - 3)  ondansetron Injectable 4 milliGRAM(s) IV Push every 6 hours PRN Nausea and/or Vomiting  oxyCODONE    IR 5 milliGRAM(s) Oral every 6 hours PRN Moderate Pain (4 - 6)  senna 2 Tablet(s) Oral at bedtime PRN Constipation

## 2019-02-17 NOTE — CONSULT NOTE ADULT - SUBJECTIVE AND OBJECTIVE BOX
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 Ommaya exchange 11 days due to catheter blockage. Here now for RUE and RLE numbness x1 days w/ mild headache. No fevers, nausea/vomiting, visual changes, confusion, chest pain, shortness of breath. PAtient fell today because she was unable to "feel" her leg.  Ommaya attempted to be tapped today, however unable to withdraw significant cyst fluid    12/18/18 Ommaya placement  1/5/19 Gamma knife SRS  2/5/19 Ommaya revision    plan for craniotomy tentatively for 2/18/19    ROS: denies cp/sob        Allergies and Intolerances:        Allergies:  	No Known Allergies:     Home Medications:   * Patient Currently Takes Medications as of 05-Feb-2019 14:20 documented in Structured Notes  · 	Keflex 500 mg oral capsule: 1 cap(s) orally 4 times a day (before meals and at bedtime)   · 	oxyCODONE 5 mg oral tablet: 1 tab(s) orally every 6 hours, As needed, Moderate to severe Pain (4 - 6) MDD:4  · 	pantoprazole 40 mg oral delayed release tablet: 1 tab(s) orally once a day   · 	levETIRAcetam 500 mg oral tablet: 1 tab(s) orally 2 times a day  · 	verapamil 120 mg/24 hours oral capsule, extended release: 1 cap(s) orally once a day  · 	Synthroid 88 mcg (0.088 mg) oral tablet: 1 tab(s) orally once a day    .    Patient History: Past Medical History:  Essential (primary) hypertension    Hypothyroidism, unspecified type    Renal cancer.    Past Surgical History:  History of nephrectomy  Left nephrectomy 2007.        Vital Signs Last 24 Hrs  T(C): 36.3 (17 Feb 2019 08:09), Max: 36.6 (16 Feb 2019 16:32)  T(F): 97.4 (17 Feb 2019 08:09), Max: 97.8 (16 Feb 2019 16:32)  HR: 82 (17 Feb 2019 08:09) (81 - 85)  BP: 105/56 (17 Feb 2019 08:09) (105/56 - 129/69)  BP(mean): --  RR: 18 (17 Feb 2019 08:09) (18 - 18)  SpO2: 95% (17 Feb 2019 08:09) (92% - 96%)    acetaminophen   Tablet .. 650 milliGRAM(s) Oral every 6 hours PRN  chlorhexidine 4% Liquid 1 Application(s) Topical once  dexamethasone  Injectable 4 milliGRAM(s) IV Push every 6 hours  docusate sodium 100 milliGRAM(s) Oral three times a day  insulin lispro (HumaLOG) corrective regimen sliding scale   SubCutaneous Before meals and at bedtime  levETIRAcetam 500 milliGRAM(s) Oral every 12 hours  levothyroxine 88 MICROGram(s) Oral daily  ondansetron Injectable 4 milliGRAM(s) IV Push every 6 hours PRN  oxyCODONE    IR 5 milliGRAM(s) Oral every 6 hours PRN  pantoprazole    Tablet 40 milliGRAM(s) Oral before breakfast  senna 2 Tablet(s) Oral at bedtime PRN  sodium chloride 0.9% with potassium chloride 20 mEq/L 1000 milliLiter(s) IV Continuous <Continuous>  verapamil  milliGRAM(s) Oral daily      PHYSICAL EXAM:  GENERAL: NAD,   EYES: conjunctiva and sclera clear  ENMT: Moist mucous membranes  NECK: Supple, No JVD, Normal thyroid  NERVOUS SYSTEM:  Alert & Oriented X3,   CHEST/LUNG: Clear to auscultation bilaterally; No rales, rhonchi, wheezing, or rubs  HEART: Regular rate and rhythm; No murmurs, rubs, or gallops  ABDOMEN: Soft, Nontender, Nondistended; Bowel sounds present  EXTREMITIES:  2+ Peripheral Pulses, No clubbing, cyanosis, or edema  LYMPH: No lymphadenopathy noted  SKIN: No rashes or lesions    Consultant(s) Notes Reviewed:  [x ] YES  [ ] NO  Care Discussed with Consultants/Other Providers [ x] YES  [ ] NO    LABS:                        7.6    5.91  )-----------( 291      ( 17 Feb 2019 09:06 )             27.1     02-17    140  |  107  |  19  ----------------------------<  205<H>  4.6   |  21<L>  |  0.76    Ca    8.6      17 Feb 2019 06:51    TPro  7.3  /  Alb  3.7  /  TBili  0.3  /  DBili  x   /  AST  15  /  ALT  13  /  AlkPhos  91  02-15    PT/INR - ( 17 Feb 2019 09:04 )   PT: 11.2 sec;   INR: 0.98 ratio         PTT - ( 17 Feb 2019 09:04 )  PTT:24.4 sec    CAPILLARY BLOOD GLUCOSE      POCT Blood Glucose.: 273 mg/dL (17 Feb 2019 12:28)            RADIOLOGY & ADDITIONAL TESTS:    Imaging Personally Reviewed:  [x ] YES  [ ] NO

## 2019-02-17 NOTE — PROGRESS NOTE ADULT - ASSESSMENT
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 Ommaya exchange 11 days due to catheter blockage. Here now for RUE and RLE numbness x1 days w/ mild headache. No fevers, nausea/vomiting, visual changes, confusion, chest pain, shortness of breath. PAtient fell today because she was unable to "feel" her leg.  Ommaya attempted to be tapped today, however unable to withdraw significant cyst fluid    12/18/18 Ommaya placement  1/5/19 Gamma knife SRS  2/5/19 Ommaya revision (15 Feb 2019 23:58)    PROCEDURE:  Adm 2/15 Rt Leg weakness-Hx Ommaya placed 2/5/2019  POD#12    PLAN:  Neuro: Preop for OR Mon 2/18-Hold AC, NPO, IVF. Monitor anemia. CBC, PT/PTT-P FU. Inc activity/OOB. To d/w family today OR plans-crani vs Ommaya revision.    [facility] [phone] called to see pt today for Med Clearance for OR 2/18 FU.     Respiratory: Patient instructed to use incentive spirometer [ X] YES [ ] NO              DVT ppx: [ ] SQL [ ] SQH and Venodynes [ ] Left [ ] Right [ X] Bilateral    Discharge Planning:  The patient was evaluated by PT and recommended Home PT and she owns a RW.       More than 30 minutes spent on total encounter: more than 50% of the visit was spent on educating the patient and family regarding condition, medications, follow up plans, signs and symptoms to be concerned with.      Assessment:  Please Check When Present   []  GCS  E   V  M     Heart Failure: []Acute, [] acute on chronic , []chronic  Heart Failure:  [] Diastolic (HFpEF), [] Systolic (HFrEF), []Combined (HFpEF and HFrEF), [] RHF, [] Pulm HTN, [] Other    [] LESA, [] ATN, [] AIN, [] other  [] CKD1, [] CKD2, [] CKD 3, [] CKD 4, [] CKD 5, []ESRD    Encephalopathy: [] Metabolic, [] Hepatic, [] toxic, [] Neurological, [] Other    Abnormal Nurtitional Status: [] malnurtition (see nutrition note), [ ]underweight: BMI < 19, [] morbid obesity: BMI >40, [] Cachexia    [] Sepsis  [] hypovolemic shock,[] cardiogenic shock, [] hemorrhagic shock, [] neuogenic shock  [] Acute Respiratory Failure  []Cerebral edema, [] Brain compression/ herniation,   [] Functional quadriplegia  [X] Acute blood loss anemia 83-year old woman w/ 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 Ommaya exchange 11 days due to catheter blockage. Here now for RUE and RLE numbness x1 days w/ mild headache. No fevers, nausea/vomiting, visual changes, confusion, chest pain, shortness of breath. PAtient fell today because she was unable to "feel" her leg.  Ommaya attempted to be tapped today, however unable to withdraw significant cyst fluid    12/18/18 Ommaya placement  1/5/19 Gamma knife SRS  2/5/19 Ommaya revision (15 Feb 2019 23:58)    PROCEDURE:  Adm 2/15 Rt Leg weakness-Hx Ommaya placed 2/5/2019  POD#12    PLAN:  Neuro: Preop for OR Mon 2/18-Hold AC, NPO, IVF. Monitor anemia. CBC, PT/PTT-P FU. Inc activity/OOB. To d/w family today OR plans-crani vs Ommaya revision.  2/17 3470 Addendum:  Rec call from Peripheral [facility]asc Lab that pt has b/l LE DVT and upon immediately re ckecking Lt DVT it was no longer visualized-rasing the possibility og migration upward.  Dr Thayer made aware and advised IVCF. Vasc Cons called #[phone] to place IVCF today.  Will get CTA Chest w/IV cont.  Start IVF now as pt will get cont today for CTA and MRI Brain.  FU BMP AM.  Plan for OR tomorrow for Crani vs repalcemant Ommaya.   [facility] called to make them aware of the above-awaiting call back.    [facility] [phone] called to see pt today for Med Clearance for OR 2/18 FU.     Respiratory: Patient instructed to use incentive spirometer [ X] YES [ ] NO              DVT ppx: [ ] SQL [ ] SQH and Venodynes [ ] Left [ ] Right [ X] Bilateral    Discharge Planning:  The patient was evaluated by PT and recommended Home PT and she owns a RW.       More than 30 minutes spent on total encounter: more than 50% of the visit was spent on educating the patient and family regarding condition, medications, follow up plans, signs and symptoms to be concerned with.      Assessment:  Please Check When Present   []  GCS  E   V  M     Heart Failure: []Acute, [] acute on chronic , []chronic  Heart Failure:  [] Diastolic (HFpEF), [] Systolic (HFrEF), []Combined (HFpEF and HFrEF), [] RHF, [] Pulm HTN, [] Other    [] LESA, [] ATN, [] AIN, [] other  [] CKD1, [] CKD2, [] CKD 3, [] CKD 4, [] CKD 5, []ESRD    Encephalopathy: [] Metabolic, [] Hepatic, [] toxic, [] Neurological, [] Other    Abnormal Nurtitional Status: [] malnurtition (see nutrition note), [ ]underweight: BMI < 19, [] morbid obesity: BMI >40, [] Cachexia    [] Sepsis  [] hypovolemic shock,[] cardiogenic shock, [] hemorrhagic shock, [] neuogenic shock  [] Acute Respiratory Failure  []Cerebral edema, [] Brain compression/ herniation,   [] Functional quadriplegia  [X] Acute blood loss anemia 83-year old woman w/ 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 Ommaya exchange 11 days due to catheter blockage. Here now for RUE and RLE numbness x1 days w/ mild headache. No fevers, nausea/vomiting, visual changes, confusion, chest pain, shortness of breath. PAtient fell today because she was unable to "feel" her leg.  Ommaya attempted to be tapped today, however unable to withdraw significant cyst fluid    12/18/18 Ommaya placement  1/5/19 Gamma knife SRS  2/5/19 Ommaya revision (15 Feb 2019 23:58)    PROCEDURE:  Adm 2/15 Rt Leg weakness-Hx Ommaya placed 2/5/2019  POD#12    PLAN:  Neuro: Preop for OR Mon 2/18-Hold AC, NPO, IVF. Monitor anemia. CBC, PT/PTT-P FU. Inc activity/OOB. To d/w family today OR plans-crani vs Ommaya revision.  2/17 8120 Addendum:  Rec call from [facility] that pt has b/l LE DVT and upon immediately re ckecking the Lt DVT was no longer visualized-rasing the possibility of migration upward.  Dr Thayer made aware at this time and advised IVCF placement. Vasc Cons called #[phone] to place IVCF today.  Will get CTA Chest w/IV cont.  Start IVF now as pt will get cont today for CTA and MRI Brain.  FU BMP AM.  Plan for OR tomorrow for Crani vs repalcemant Ommaya once Med Clear.   [facility] called and above d/w her.    [facility] [phone] called to see pt today for Med Clearance for OR 2/18 FU.     Respiratory: Patient instructed to use incentive spirometer [ X] YES [ ] NO              DVT ppx: [ ] SQL [ ] SQH and Venodynes [ ] Left [ ] Right [ X] Bilateral    Discharge Planning:  The patient was evaluated by PT and recommended Home PT and she owns a RW.       More than 30 minutes spent on total encounter: more than 50% of the visit was spent on educating the patient and family regarding condition, medications, follow up plans, signs and symptoms to be concerned with.      Assessment:  Please Check When Present   []  GCS  E   V  M     Heart Failure: []Acute, [] acute on chronic , []chronic  Heart Failure:  [] Diastolic (HFpEF), [] Systolic (HFrEF), []Combined (HFpEF and HFrEF), [] RHF, [] Pulm HTN, [] Other    [] LESA, [] ATN, [] AIN, [] other  [] CKD1, [] CKD2, [] CKD 3, [] CKD 4, [] CKD 5, []ESRD    Encephalopathy: [] Metabolic, [] Hepatic, [] toxic, [] Neurological, [] Other    Abnormal Nurtitional Status: [] malnurtition (see nutrition note), [ ]underweight: BMI < 19, [] morbid obesity: BMI >40, [] Cachexia    [] Sepsis  [] hypovolemic shock,[] cardiogenic shock, [] hemorrhagic shock, [] neuogenic shock  [] Acute Respiratory Failure  []Cerebral edema, [] Brain compression/ herniation,   [] Functional quadriplegia  [X] Acute blood loss anemia

## 2019-02-17 NOTE — CONSULT NOTE ADULT - SUBJECTIVE AND OBJECTIVE BOX
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 Ommaya exchange 11 days due to catheter blockage. Here now for RUE and RLE numbness x1 days w/ mild headache. No fevers, nausea/vomiting, visual changes, confusion, chest pain, shortness of breath. PAtient fell today because she was unable to "feel" her leg.  Ommaya attempted to be tapped today, however unable to withdraw significant cyst fluid    18 Ommaya placement  19 Gamma knife SRS  19 Ommaya revision (15 Feb 2019 23:58)      PRENATAL/BIRTH HISTORY:  [  ] Term   [  ] Pre-term   Gest Age (wks):	               Apgars:                    Birth Wt:  [  ] Spontaneous Vaginal Delivery	              [  ]     reason:    PAST MEDICAL & SURGICAL HISTORY:  Essential (primary) hypertension  Hypothyroidism, unspecified type  Renal cancer  History of nephrectomy: Left nephrectomy     [  ] No significant past history as reviewed with the patient and family    FAMILY HISTORY:    [  ] Family history not pertinent as reviewed with the patient and family    SOCIAL HISTORY:    MEDICATIONS  (STANDING):  chlorhexidine 4% Liquid 1 Application(s) Topical once  dexamethasone  Injectable 4 milliGRAM(s) IV Push every 6 hours  docusate sodium 100 milliGRAM(s) Oral three times a day  insulin lispro (HumaLOG) corrective regimen sliding scale   SubCutaneous Before meals and at bedtime  levETIRAcetam 500 milliGRAM(s) Oral every 12 hours  levothyroxine 88 MICROGram(s) Oral daily  pantoprazole    Tablet 40 milliGRAM(s) Oral before breakfast  sodium chloride 0.9% with potassium chloride 20 mEq/L 1000 milliLiter(s) (75 mL/Hr) IV Continuous <Continuous>  verapamil  milliGRAM(s) Oral daily    MEDICATIONS  (PRN):  acetaminophen   Tablet .. 650 milliGRAM(s) Oral every 6 hours PRN Temp greater or equal to 38C (100.4F), Mild Pain (1 - 3)  ondansetron Injectable 4 milliGRAM(s) IV Push every 6 hours PRN Nausea and/or Vomiting  oxyCODONE    IR 5 milliGRAM(s) Oral every 6 hours PRN Moderate Pain (4 - 6)  senna 2 Tablet(s) Oral at bedtime PRN Constipation    Allergies    No Known Allergies    Intolerances        Vital Signs Last 24 Hrs  T(C): 36.3 (2019 08:09), Max: 36.5 (2019 00:36)  T(F): 97.4 (2019 08:09), Max: 97.7 (2019 00:36)  HR: 82 (2019 08:09) (81 - 85)  BP: 105/56 (2019 08:09) (105/56 - 129/69)  BP(mean): --  RR: 18 (2019 08:09) (18 - 18)  SpO2: 95% (2019 08:09) (92% - 95%)  Daily     Daily                             7.6    5.91  )-----------( 291      ( 2019 09:06 )             27.1     02-17    140  |  107  |  19  ----------------------------<  205<H>  4.6   |  21<L>  |  0.76    Ca    8.6      2019 06:51    TPro  7.3  /  Alb  3.7  /  TBili  0.3  /  DBili  x   /  AST  15  /  ALT  13  /  AlkPhos  91  02-15    PT/INR - ( 2019 09:04 )   PT: 11.2 sec;   INR: 0.98 ratio         PTT - ( 2019 09:04 )  PTT:24.4 sec      IMAGING STUDIES: VASCULAR SURGERY CONSULT NOTE    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 with plans for possible craniectomy tomorrow presents with left femoral vein DVT on screening ultrasound, vascular surgery consulted for possible IVC filter.   Patient had history of craniectomy and ommaya placement on 12/18 which is now malfunctioning and needs either replacement or craniectomy and cyst removal. Was tentatively scheduled for tomorrow prior to DVT study.       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 Ommaya exchange 11 days due to catheter blockage. Here now for RUE and RLE numbness x1 days w/ mild headache. No fevers, nausea/vomiting, visual changes, confusion, chest pain, shortness of breath. PAtient fell today because she was unable to "feel" her leg.  Ommaya attempted to be tapped today, however unable to withdraw significant cyst fluid    12/18/18 Ommaya placement  1/5/19 Gamma knife SRS  2/5/19 Ommaya revision (15 Feb 2019 23:58)      PAST MEDICAL & SURGICAL HISTORY:  Essential (primary) hypertension  Hypothyroidism, unspecified type  Renal cancer  History of nephrectomy: Left nephrectomy 2007    FAMILY HISTORY:    [  ] Family history not pertinent as reviewed with the patient and family    SOCIAL HISTORY:    MEDICATIONS  (STANDING):  chlorhexidine 4% Liquid 1 Application(s) Topical once  dexamethasone  Injectable 4 milliGRAM(s) IV Push every 6 hours  docusate sodium 100 milliGRAM(s) Oral three times a day  insulin lispro (HumaLOG) corrective regimen sliding scale   SubCutaneous Before meals and at bedtime  levETIRAcetam 500 milliGRAM(s) Oral every 12 hours  levothyroxine 88 MICROGram(s) Oral daily  pantoprazole    Tablet 40 milliGRAM(s) Oral before breakfast  sodium chloride 0.9% with potassium chloride 20 mEq/L 1000 milliLiter(s) (75 mL/Hr) IV Continuous <Continuous>  verapamil  milliGRAM(s) Oral daily    MEDICATIONS  (PRN):  acetaminophen   Tablet .. 650 milliGRAM(s) Oral every 6 hours PRN Temp greater or equal to 38C (100.4F), Mild Pain (1 - 3)  ondansetron Injectable 4 milliGRAM(s) IV Push every 6 hours PRN Nausea and/or Vomiting  oxyCODONE    IR 5 milliGRAM(s) Oral every 6 hours PRN Moderate Pain (4 - 6)  senna 2 Tablet(s) Oral at bedtime PRN Constipation    Allergies    No Known Allergies    Intolerances    PHYSICAL EXAM    Vital Signs Last 24 Hrs  T(C): 36.3 (17 Feb 2019 08:09), Max: 36.5 (17 Feb 2019 00:36)  T(F): 97.4 (17 Feb 2019 08:09), Max: 97.7 (17 Feb 2019 00:36)  HR: 82 (17 Feb 2019 08:09) (81 - 85)  BP: 105/56 (17 Feb 2019 08:09) (105/56 - 129/69)  BP(mean): --  RR: 18 (17 Feb 2019 08:09) (18 - 18)  SpO2: 95% (17 Feb 2019 08:09) (92% - 95%)  Daily     Daily     General: WN/WD NAD  Neurology: A&Ox3, nonfocal, NAVAS x 4  Head:  Normocephalic, atraumatic  ENT:  Mucosa moist, no ulcerations  Neck:  Supple, no sinuses or palpable masses  Lymphatic:  No palpable cervical, supraclavicular, axillary or inguinal adenopathy  Respiratory: CTA B/L  CV: RRR, S1S2, no murmur  Abdominal: Soft, NT, ND no palpable mass  MSK: b/l lower extremity edema right worse than left with palpable pulses                           7.6    5.91  )-----------( 291      ( 17 Feb 2019 09:06 )             27.1     02-17    140  |  107  |  19  ----------------------------<  205<H>  4.6   |  21<L>  |  0.76    Ca    8.6      17 Feb 2019 06:51    TPro  7.3  /  Alb  3.7  /  TBili  0.3  /  DBili  x   /  AST  15  /  ALT  13  /  AlkPhos  91  02-15    PT/INR - ( 17 Feb 2019 09:04 )   PT: 11.2 sec;   INR: 0.98 ratio         PTT - ( 17 Feb 2019 09:04 )  PTT:24.4 sec      IMAGING STUDIES:    MPRESSION:     RIGHT: DVT in the peroneal and soleal veins.  LEFT: Thrombus in the greater saphenous vein extending into the common   femoral vein. Please note upon rescanning, echogenic thrombus is no   longer visualized and is concerning for superior migration of clot. If   there is clinical concern for pulmonary embolism, CTPA can be performed   for further evaluation.    Acute deep venous thrombosis: above (left) and below the knee (right).    Tiny left Finnegan's cyst.

## 2019-02-18 ENCOUNTER — RESULT REVIEW (OUTPATIENT)
Age: 84
End: 2019-02-18

## 2019-02-18 DIAGNOSIS — I82.403 ACUTE EMBOLISM AND THROMBOSIS OF UNSPECIFIED DEEP VEINS OF LOWER EXTREMITY, BILATERAL: ICD-10-CM

## 2019-02-18 DIAGNOSIS — C64.9 MALIGNANT NEOPLASM OF UNSPECIFIED KIDNEY, EXCEPT RENAL PELVIS: ICD-10-CM

## 2019-02-18 DIAGNOSIS — I26.99 OTHER PULMONARY EMBOLISM WITHOUT ACUTE COR PULMONALE: ICD-10-CM

## 2019-02-18 DIAGNOSIS — I82.409 ACUTE EMBOLISM AND THROMBOSIS OF UNSPECIFIED DEEP VEINS OF UNSPECIFIED LOWER EXTREMITY: ICD-10-CM

## 2019-02-18 LAB
ALBUMIN SERPL ELPH-MCNC: 3.3 G/DL — SIGNIFICANT CHANGE UP (ref 3.3–5)
ALP SERPL-CCNC: 77 U/L — SIGNIFICANT CHANGE UP (ref 40–120)
ALT FLD-CCNC: 12 U/L — SIGNIFICANT CHANGE UP (ref 10–45)
ANION GAP SERPL CALC-SCNC: 12 MMOL/L — SIGNIFICANT CHANGE UP (ref 5–17)
ANION GAP SERPL CALC-SCNC: 13 MMOL/L — SIGNIFICANT CHANGE UP (ref 5–17)
AST SERPL-CCNC: 10 U/L — SIGNIFICANT CHANGE UP (ref 10–40)
BASOPHILS # BLD AUTO: 0 K/UL — SIGNIFICANT CHANGE UP (ref 0–0.2)
BASOPHILS NFR BLD AUTO: 0.2 % — SIGNIFICANT CHANGE UP (ref 0–2)
BILIRUB SERPL-MCNC: 0.1 MG/DL — LOW (ref 0.2–1.2)
BUN SERPL-MCNC: 20 MG/DL — SIGNIFICANT CHANGE UP (ref 7–23)
BUN SERPL-MCNC: 22 MG/DL — SIGNIFICANT CHANGE UP (ref 7–23)
CALCIUM SERPL-MCNC: 8.2 MG/DL — LOW (ref 8.4–10.5)
CALCIUM SERPL-MCNC: 8.7 MG/DL — SIGNIFICANT CHANGE UP (ref 8.4–10.5)
CHLORIDE SERPL-SCNC: 106 MMOL/L — SIGNIFICANT CHANGE UP (ref 96–108)
CHLORIDE SERPL-SCNC: 108 MMOL/L — SIGNIFICANT CHANGE UP (ref 96–108)
CO2 SERPL-SCNC: 21 MMOL/L — LOW (ref 22–31)
CO2 SERPL-SCNC: 22 MMOL/L — SIGNIFICANT CHANGE UP (ref 22–31)
CREAT SERPL-MCNC: 0.71 MG/DL — SIGNIFICANT CHANGE UP (ref 0.5–1.3)
CREAT SERPL-MCNC: 0.81 MG/DL — SIGNIFICANT CHANGE UP (ref 0.5–1.3)
EOSINOPHIL # BLD AUTO: 0 K/UL — SIGNIFICANT CHANGE UP (ref 0–0.5)
EOSINOPHIL NFR BLD AUTO: 0.1 % — SIGNIFICANT CHANGE UP (ref 0–6)
GAS PNL BLDA: SIGNIFICANT CHANGE UP
GAS PNL BLDA: SIGNIFICANT CHANGE UP
GLUCOSE BLDC GLUCOMTR-MCNC: 137 MG/DL — HIGH (ref 70–99)
GLUCOSE BLDC GLUCOMTR-MCNC: 177 MG/DL — HIGH (ref 70–99)
GLUCOSE SERPL-MCNC: 137 MG/DL — HIGH (ref 70–99)
GLUCOSE SERPL-MCNC: 158 MG/DL — HIGH (ref 70–99)
HBA1C BLD-MCNC: 6.2 % — HIGH (ref 4–5.6)
HCT VFR BLD CALC: 26.6 % — LOW (ref 34.5–45)
HCT VFR BLD CALC: 29.3 % — LOW (ref 34.5–45)
HGB BLD-MCNC: 8.3 G/DL — LOW (ref 11.5–15.5)
HGB BLD-MCNC: 9.3 G/DL — LOW (ref 11.5–15.5)
LYMPHOCYTES # BLD AUTO: 0.7 K/UL — LOW (ref 1–3.3)
LYMPHOCYTES # BLD AUTO: 6.1 % — LOW (ref 13–44)
MCHC RBC-ENTMCNC: 22.4 PG — LOW (ref 27–34)
MCHC RBC-ENTMCNC: 22.6 PG — LOW (ref 27–34)
MCHC RBC-ENTMCNC: 31.4 GM/DL — LOW (ref 32–36)
MCHC RBC-ENTMCNC: 31.6 GM/DL — LOW (ref 32–36)
MCV RBC AUTO: 71.3 FL — LOW (ref 80–100)
MCV RBC AUTO: 71.5 FL — LOW (ref 80–100)
MONOCYTES # BLD AUTO: 0.5 K/UL — SIGNIFICANT CHANGE UP (ref 0–0.9)
MONOCYTES NFR BLD AUTO: 4.4 % — SIGNIFICANT CHANGE UP (ref 2–14)
NEUTROPHILS # BLD AUTO: 9.9 K/UL — HIGH (ref 1.8–7.4)
NEUTROPHILS NFR BLD AUTO: 89.1 % — HIGH (ref 43–77)
PLATELET # BLD AUTO: 247 K/UL — SIGNIFICANT CHANGE UP (ref 150–400)
PLATELET # BLD AUTO: 294 K/UL — SIGNIFICANT CHANGE UP (ref 150–400)
POTASSIUM SERPL-MCNC: 4.3 MMOL/L — SIGNIFICANT CHANGE UP (ref 3.5–5.3)
POTASSIUM SERPL-MCNC: 4.8 MMOL/L — SIGNIFICANT CHANGE UP (ref 3.5–5.3)
POTASSIUM SERPL-SCNC: 4.3 MMOL/L — SIGNIFICANT CHANGE UP (ref 3.5–5.3)
POTASSIUM SERPL-SCNC: 4.8 MMOL/L — SIGNIFICANT CHANGE UP (ref 3.5–5.3)
PROT SERPL-MCNC: 6.5 G/DL — SIGNIFICANT CHANGE UP (ref 6–8.3)
RBC # BLD: 3.72 M/UL — LOW (ref 3.8–5.2)
RBC # BLD: 4.11 M/UL — SIGNIFICANT CHANGE UP (ref 3.8–5.2)
RBC # FLD: 16.9 % — HIGH (ref 10.3–14.5)
RBC # FLD: 17.1 % — HIGH (ref 10.3–14.5)
SODIUM SERPL-SCNC: 140 MMOL/L — SIGNIFICANT CHANGE UP (ref 135–145)
SODIUM SERPL-SCNC: 142 MMOL/L — SIGNIFICANT CHANGE UP (ref 135–145)
WBC # BLD: 11.1 K/UL — HIGH (ref 3.8–10.5)
WBC # BLD: 12.2 K/UL — HIGH (ref 3.8–10.5)
WBC # FLD AUTO: 11.1 K/UL — HIGH (ref 3.8–10.5)
WBC # FLD AUTO: 12.2 K/UL — HIGH (ref 3.8–10.5)

## 2019-02-18 PROCEDURE — 61781 SCAN PROC CRANIAL INTRA: CPT | Mod: 58

## 2019-02-18 PROCEDURE — 99232 SBSQ HOSP IP/OBS MODERATE 35: CPT

## 2019-02-18 PROCEDURE — 88300 SURGICAL PATH GROSS: CPT | Mod: 26,59

## 2019-02-18 PROCEDURE — 99291 CRITICAL CARE FIRST HOUR: CPT

## 2019-02-18 PROCEDURE — 69990 MICROSURGERY ADD-ON: CPT | Mod: 58,59

## 2019-02-18 PROCEDURE — 88307 TISSUE EXAM BY PATHOLOGIST: CPT | Mod: 26

## 2019-02-18 PROCEDURE — 61510 CRNEC TREPH EXC BRN TUM STTL: CPT | Mod: 58

## 2019-02-18 PROCEDURE — 95961 ELECTRODE STIMULATION BRAIN: CPT | Mod: 26,58,52

## 2019-02-18 RX ORDER — DEXTROSE 50 % IN WATER 50 %
12.5 SYRINGE (ML) INTRAVENOUS ONCE
Qty: 0 | Refills: 0 | Status: DISCONTINUED | OUTPATIENT
Start: 2019-02-18 | End: 2019-02-22

## 2019-02-18 RX ORDER — DEXTROSE 50 % IN WATER 50 %
25 SYRINGE (ML) INTRAVENOUS ONCE
Qty: 0 | Refills: 0 | Status: DISCONTINUED | OUTPATIENT
Start: 2019-02-18 | End: 2019-02-22

## 2019-02-18 RX ORDER — CEFAZOLIN SODIUM 1 G
2000 VIAL (EA) INJECTION EVERY 8 HOURS
Qty: 0 | Refills: 0 | Status: COMPLETED | OUTPATIENT
Start: 2019-02-18 | End: 2019-02-19

## 2019-02-18 RX ORDER — DEXAMETHASONE 0.5 MG/5ML
4 ELIXIR ORAL EVERY 6 HOURS
Qty: 0 | Refills: 0 | Status: DISCONTINUED | OUTPATIENT
Start: 2019-02-18 | End: 2019-02-21

## 2019-02-18 RX ORDER — LEVETIRACETAM 250 MG/1
500 TABLET, FILM COATED ORAL EVERY 12 HOURS
Qty: 0 | Refills: 0 | Status: DISCONTINUED | OUTPATIENT
Start: 2019-02-18 | End: 2019-02-22

## 2019-02-18 RX ORDER — INSULIN LISPRO 100/ML
VIAL (ML) SUBCUTANEOUS EVERY 6 HOURS
Qty: 0 | Refills: 0 | Status: DISCONTINUED | OUTPATIENT
Start: 2019-02-18 | End: 2019-02-18

## 2019-02-18 RX ORDER — DEXTROSE MONOHYDRATE, SODIUM CHLORIDE, AND POTASSIUM CHLORIDE 50; .745; 4.5 G/1000ML; G/1000ML; G/1000ML
1000 INJECTION, SOLUTION INTRAVENOUS
Qty: 0 | Refills: 0 | Status: DISCONTINUED | OUTPATIENT
Start: 2019-02-18 | End: 2019-02-19

## 2019-02-18 RX ORDER — DEXTROSE 50 % IN WATER 50 %
15 SYRINGE (ML) INTRAVENOUS ONCE
Qty: 0 | Refills: 0 | Status: DISCONTINUED | OUTPATIENT
Start: 2019-02-18 | End: 2019-02-22

## 2019-02-18 RX ORDER — SODIUM CHLORIDE 9 MG/ML
1000 INJECTION, SOLUTION INTRAVENOUS
Qty: 0 | Refills: 0 | Status: DISCONTINUED | OUTPATIENT
Start: 2019-02-18 | End: 2019-02-22

## 2019-02-18 RX ORDER — INSULIN LISPRO 100/ML
VIAL (ML) SUBCUTANEOUS
Qty: 0 | Refills: 0 | Status: DISCONTINUED | OUTPATIENT
Start: 2019-02-18 | End: 2019-02-22

## 2019-02-18 RX ORDER — ACETAMINOPHEN 500 MG
1000 TABLET ORAL ONCE
Qty: 0 | Refills: 0 | Status: COMPLETED | OUTPATIENT
Start: 2019-02-18 | End: 2019-02-18

## 2019-02-18 RX ORDER — SENNA PLUS 8.6 MG/1
2 TABLET ORAL AT BEDTIME
Qty: 0 | Refills: 0 | Status: DISCONTINUED | OUTPATIENT
Start: 2019-02-18 | End: 2019-02-22

## 2019-02-18 RX ORDER — DOCUSATE SODIUM 100 MG
100 CAPSULE ORAL THREE TIMES A DAY
Qty: 0 | Refills: 0 | Status: DISCONTINUED | OUTPATIENT
Start: 2019-02-18 | End: 2019-02-22

## 2019-02-18 RX ORDER — ONDANSETRON 8 MG/1
4 TABLET, FILM COATED ORAL EVERY 6 HOURS
Qty: 0 | Refills: 0 | Status: DISCONTINUED | OUTPATIENT
Start: 2019-02-18 | End: 2019-02-22

## 2019-02-18 RX ORDER — ACETAMINOPHEN 500 MG
650 TABLET ORAL EVERY 6 HOURS
Qty: 0 | Refills: 0 | Status: DISCONTINUED | OUTPATIENT
Start: 2019-02-18 | End: 2019-02-22

## 2019-02-18 RX ORDER — INSULIN LISPRO 100/ML
VIAL (ML) SUBCUTANEOUS AT BEDTIME
Qty: 0 | Refills: 0 | Status: DISCONTINUED | OUTPATIENT
Start: 2019-02-18 | End: 2019-02-22

## 2019-02-18 RX ORDER — GLUCAGON INJECTION, SOLUTION 0.5 MG/.1ML
1 INJECTION, SOLUTION SUBCUTANEOUS ONCE
Qty: 0 | Refills: 0 | Status: DISCONTINUED | OUTPATIENT
Start: 2019-02-18 | End: 2019-02-22

## 2019-02-18 RX ADMIN — Medication 120 MILLIGRAM(S): at 05:14

## 2019-02-18 RX ADMIN — Medication 1: at 05:47

## 2019-02-18 RX ADMIN — Medication 4 MILLIGRAM(S): at 17:48

## 2019-02-18 RX ADMIN — Medication 100 MILLIGRAM(S): at 17:31

## 2019-02-18 RX ADMIN — LEVETIRACETAM 500 MILLIGRAM(S): 250 TABLET, FILM COATED ORAL at 17:47

## 2019-02-18 RX ADMIN — Medication 650 MILLIGRAM(S): at 22:30

## 2019-02-18 RX ADMIN — Medication 4 MILLIGRAM(S): at 05:14

## 2019-02-18 RX ADMIN — Medication 1000 MILLIGRAM(S): at 17:00

## 2019-02-18 RX ADMIN — Medication 88 MICROGRAM(S): at 05:14

## 2019-02-18 RX ADMIN — LEVETIRACETAM 500 MILLIGRAM(S): 250 TABLET, FILM COATED ORAL at 05:14

## 2019-02-18 RX ADMIN — Medication 650 MILLIGRAM(S): at 22:00

## 2019-02-18 RX ADMIN — Medication 400 MILLIGRAM(S): at 15:56

## 2019-02-18 RX ADMIN — CHLORHEXIDINE GLUCONATE 1 APPLICATION(S): 213 SOLUTION TOPICAL at 05:47

## 2019-02-18 NOTE — CHART NOTE - NSCHARTNOTEFT_GEN_A_CORE
Discussed with patients nurse and Vascular team. Pt had IVCF placed last night. The team will document this morning.

## 2019-02-18 NOTE — PROGRESS NOTE ADULT - PROBLEM SELECTOR PLAN 1
Child's Well Visit, Birth [email or web address]/. Enter [phone] in the search box to learn more about \"Child's Well Visit, Birth to 1 Month: Care Instructions. \"  Current as of: May 11, 2018  Content Version: 11.8  © 0425-5157 [employer]. I have seen and examined the patient today and agree with  the  evaluation, assessment and plan of the surgical house officer

## 2019-02-18 NOTE — PROGRESS NOTE ADULT - ASSESSMENT
83F s/p crani for cyst resection, doing well  - Care per [facility]  - CT in AM

## 2019-02-18 NOTE — CONSULT NOTE ADULT - PROBLEM SELECTOR RECOMMENDATION 2
DVT in the peroneal and soleal veins. Thrombus in the greater saphenous vein extending into the common femoral vein.  -S/p IVC filter placement 2/17

## 2019-02-18 NOTE — PROGRESS NOTE ADULT - SUBJECTIVE AND OBJECTIVE BOX
Patient seen and examined at bedside.    --Anticoagulation--    T(C): 36.1 (02-18-19 @ 13:50), Max: 36.5 (02-18-19 @ 00:09)  HR: 78 (02-18-19 @ 17:30) (72 - 93)  BP: 128/59 (02-18-19 @ 17:30) (101/62 - 155/73)  RR: 16 (02-18-19 @ 17:30) (16 - 18)  SpO2: 97% (02-18-19 @ 17:30) (88% - 100%)  Wt(kg): --    Exam:  Awake, Alert, AOX3  PERRL, EOMI, Face equal, Tongue m/l  5/5 throughout, no drift  SILT  Visual fields full b/l

## 2019-02-18 NOTE — PROGRESS NOTE ADULT - ASSESSMENT
ASSESSMENT:   S/p L crani for mts lesion resection.  H/o RCC with lung + cerebral mts  BL LE DVT, PE -  hemodynam stable. S/p IVC.    NEURO:  CT Head in AM, MRI post-op within 48 hrs, Pain control  Cont Dexa and Keppra  Activity: [] OOB as tolerated [] Bedrest [] PT [] OT [] PMNR    PULM:  IS  PE - No signs of RV strain on EKG, CT, hemodynam stable.    CV:  -150mmHg, d/c a-line in AM if hemodynamically stable    RENAL:  IVF until good PO intake    GI:  Diet: Dysphagia screen and then advance diet as tolerated  GI prophylaxis [x not indicated [] PPI [] other:  Bowel regimen [x] colace [x] senna [] other:    ENDO:   Goal euglycemia (-180)    HEME/ONC:  VTE prophylaxis: [x] SCDs [] chemoprophylaxis [x] hold chemoprophylaxis due to: fresh post op  [] high risk of DVT/PE on admission due to:  Will need AC when cleared by NSGy.    ID:  Marie-op antibiotics    MISC:    SOCIAL/FAMILY:  [x] awaiting [] updated at bedside [] family meeting    CODE STATUS:  [x] Full Code [] DNR [] DNI [] Palliative/Comfort Care    DISPOSITION:  [x] ICU [] Stroke Unit [] Floor [] EMU [] RCU [] PCU    [x] Patient is at high risk of neurologic deterioration/death due to:     Time spent: 45 critical care minutes ASSESSMENT:   S/p L crani for mts lesion resection.  H/o RCC with lung + cerebral mts  BL LE DVT, PE -  hemodynam stable. S/p IVC.    NEURO:  CT Head in AM, MRI post-op within 48 hrs, Pain control  Cont Dexa and Keppra  Activity: [] OOB as tolerated [] Bedrest [] PT [] OT [] PMNR    PULM:  IS  PE - No signs of RV strain on EKG, CT, hemodynam stable.    CV:  -150mmHg, d/c a-line in AM if hemodynamically stable    RENAL:  IVF until good PO intake    GI:  Diet: Dysphagia screen and then advance diet as tolerated  GI prophylaxis [x not indicated [] PPI [] other:  Bowel regimen [x] colace [x] senna [] other:    ENDO:   Goal euglycemia (-180)    HEME/ONC:  VTE prophylaxis: [x] SCDs [] chemoprophylaxis [x] hold chemoprophylaxis due to: fresh post op  [] high risk of DVT/PE on admission due to:  Will need AC when cleared by NSGy.    ID:  Marie-op antibiotics    MISC:    SOCIAL/FAMILY:  [x] awaiting [] updated at bedside [] family meeting    CODE STATUS:  [x] Full Code [] DNR [] DNI [] Palliative/Comfort Care    DISPOSITION:  [x] ICU [] Stroke Unit [] Floor [] EMU [] RCU [] PCU    [x] Patient is at high risk of neurologic deterioration/death due to: Post-operative hemorrhage/stroke, brain compression, cerebral edema    Time spent: 45 critical care minutes

## 2019-02-18 NOTE — CONSULT NOTE ADULT - PROBLEM SELECTOR RECOMMENDATION 9
Pulmonary emboli involving the right main pulmonary artery with extension   into the right lower lobe lobar, segmental and subsegmental coronary   arteries.   -Eventual full AC when cleared by neurosurgery
I performed a history and physical exam of the patient and discussed  the findings and plan with the house officer. I reviewed the resident note and agree with the findings and plan

## 2019-02-18 NOTE — PROGRESS NOTE ADULT - ASSESSMENT
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

## 2019-02-18 NOTE — CONSULT NOTE ADULT - SUBJECTIVE AND OBJECTIVE BOX
PULMONARY CONSULT    HPI: 84 yo female with PMH of RCC with cystic brain metastasis s/p Ommaya placement with weekly drainage. Ommaya exchange 11 days ago due to catheter blockage. Presents to ED with RUE and RLE numbness x1 day with mild headache. Ommaya attempted to be tapped on day of admission, however was unable to withdraw significant cyst fluid. CT chest multiple metastatic nodules bilaterally and right mainstem PE. IVC filter placed 2/17. S/p craniotomy 2/18. Sats 98% on 2LNC. Denies SOB, cough, hemoptysis, wheezing, CP, or pleuritic CP.      12/18/18 Ommaya placement  1/5/19 Gamma knife SRS  2/5/19 Ommaya revision (15 Feb 2019 23:58)      PAST MEDICAL & SURGICAL HISTORY:  Essential (primary) hypertension  Hypothyroidism, unspecified type  Renal cancer  History of nephrectomy: Left nephrectomy 2007    Allergies  No Known Allergies      FAMILY HISTORY: no significant family history in first degree realitives     Social history: former smoker, 14 pack year history, quit in 1978    Review of Systems:  CONSTITUTIONAL: No fever, chills, or fatigue  EYES: No eye pain, visual disturbances, or discharge  ENMT:  No difficulty hearing, tinnitus, vertigo; No sinus or throat pain  NECK: No pain or stiffness  RESPIRATORY: Per above  CARDIOVASCULAR: No chest pain, palpitations, dizziness, or leg swelling  GASTROINTESTINAL: No abdominal or epigastric pain. No nausea, vomiting, or hematemesis; No diarrhea or constipation. No melena or hematochezia.  GENITOURINARY: No dysuria, frequency, hematuria, or incontinence  NEUROLOGICAL: No headaches, memory loss, loss of strength, numbness, or tremors  SKIN: No itching, burning, rashes, or lesions   MUSCULOSKELETAL: No joint pain or swelling; No muscle, back, or extremity pain  PSYCHIATRIC: No depression, anxiety, mood swings, or difficulty sleeping      Medications:  MEDICATIONS  (STANDING):  dexamethasone     Tablet 4 milliGRAM(s) Oral every 6 hours  dextrose 5%. 1000 milliLiter(s) (50 mL/Hr) IV Continuous <Continuous>  dextrose 50% Injectable 12.5 Gram(s) IV Push once  dextrose 50% Injectable 25 Gram(s) IV Push once  dextrose 50% Injectable 25 Gram(s) IV Push once  docusate sodium 100 milliGRAM(s) Oral three times a day  insulin lispro (HumaLOG) corrective regimen sliding scale   SubCutaneous three times a day before meals  insulin lispro (HumaLOG) corrective regimen sliding scale   SubCutaneous at bedtime  levETIRAcetam 500 milliGRAM(s) Oral every 12 hours  sodium chloride 0.9% with potassium chloride 20 mEq/L 1000 milliLiter(s) (75 mL/Hr) IV Continuous <Continuous>    MEDICATIONS  (PRN):  acetaminophen   Tablet .. 650 milliGRAM(s) Oral every 6 hours PRN Temp greater or equal to 38C (100.4F), Mild Pain (1 - 3)  dextrose 40% Gel 15 Gram(s) Oral once PRN Blood Glucose LESS THAN 70 milliGRAM(s)/deciliter  glucagon  Injectable 1 milliGRAM(s) IntraMuscular once PRN Glucose LESS THAN 70 milligrams/deciliter  ondansetron Injectable 4 milliGRAM(s) IV Push every 6 hours PRN Nausea and/or Vomiting  senna 2 Tablet(s) Oral at bedtime PRN Constipation            Vital Signs Last 24 Hrs  T(C): 36.1 (18 Feb 2019 13:50), Max: 36.5 (18 Feb 2019 00:09)  T(F): 97 (18 Feb 2019 13:50), Max: 97.7 (18 Feb 2019 00:09)  HR: 84 (18 Feb 2019 14:05) (76 - 93)  BP: 148/67 (18 Feb 2019 14:05) (101/62 - 153/70)  BP(mean): 97 (18 Feb 2019 14:05) (77 - 100)  RR: 16 (18 Feb 2019 14:05) (16 - 18)  SpO2: 99% (18 Feb 2019 14:05) (94% - 99%)    ABG - ( 18 Feb 2019 10:38 )  pH, Arterial: 7.39  pH, Blood: x     /  pCO2: 38    /  pO2: 361   / HCO3: 23    / Base Excess: -1.5  /  SaO2: 100                     02-17 @ 07:01  -  02-18 @ 07:00  --------------------------------------------------------  IN: 240 mL / OUT: 900 mL / NET: -660 mL          LABS:                        9.3    12.2  )-----------( 294      ( 18 Feb 2019 05:53 )             29.3     02-18    140  |  106  |  22  ----------------------------<  158<H>  4.8   |  22  |  0.81    Ca    8.7      18 Feb 2019 05:53            CAPILLARY BLOOD GLUCOSE      POCT Blood Glucose.: 177 mg/dL (18 Feb 2019 05:44)    PT/INR - ( 17 Feb 2019 09:04 )   PT: 11.2 sec;   INR: 0.98 ratio         PTT - ( 17 Feb 2019 09:04 )  PTT:24.4 sec            CULTURES:             Physical Examination:    General: No acute distress.      HEENT: Pupils equal, reactive to light.  Symmetric.    PULM: Clear to auscultation bilaterally, no significant sputum production    CVS: RRR    ABD: Soft, nondistended, nontender, normoactive bowel sounds, no masses    EXT: No edema, nontender    SKIN: Warm and well perfused, no rashes noted.    NEURO: Alert, oriented, interactive, nonfocal    RADIOLOGY REVIEWED  CT chest:  LUNGS AND LARGE AIRWAYS: Patent central airways.  Interval progression of   bilateral metastatic disease. For reference, a right upper lobe nodule   currently measures 2.8 x 2.3 cm (5:22), previously 2.5 x 2.3 cm.  PLEURA: No pleural effusion.    IMPRESSION:  Pulmonary emboli involving the right main pulmonary artery with extension   into the right lower lobe lobar, segmental and subsegmental coronary   arteries. No evidence of right heart strain.    Overall progression of metastatic disease in thelungs, left fifth rib,   and the upper abdomen.    Unchanged mediastinal adenopathy.

## 2019-02-18 NOTE — PROGRESS NOTE ADULT - SUBJECTIVE AND OBJECTIVE BOX
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 Ommaya exchange 11 days due to catheter blockage. Here now for RUE and RLE numbness x1 days w/ mild headache. No fevers, nausea/vomiting, visual changes, confusion, chest pain, shortness of breath. PAtient fell today because she was unable to "feel" her leg.  Ommaya attempted to be tapped today, however unable to withdraw significant cyst fluid  12/18/18 Ommaya placement  1/5/19 Gamma knife SRS  2/5/19 Ommaya revision (15 Feb 2019 23:58)  Presented for cerebral lesion removal.    On admission, the patient was:  GCS: 15.    VITALS:  T(C): , Max: 36.5 (02-18-19 @ 00:09)  HR:  (74 - 93)  BP:  (101/62 - 155/73)  ABP:  (129/103 - 189/152)  RR:  (16 - 18)  SpO2:  (88% - 100%)  Wt(kg): --      02-17-19 @ 07:01  -  02-18-19 @ 07:00  --------------------------------------------------------  IN: 240 mL / OUT: 900 mL / NET: -660 mL    02-18-19 @ 07:01  -  02-18-19 @ 17:08  --------------------------------------------------------  IN: 195 mL / OUT: 100 mL / NET: 95 mL      LABS:  Na: 142 (02-18 @ 15:18), 140 (02-18 @ 05:53), 140 (02-17 @ 06:51), 138 (02-15 @ 22:46)  K: 4.3 (02-18 @ 15:18), 4.8 (02-18 @ 05:53), 4.6 (02-17 @ 06:51), 4.2 (02-15 @ 22:46)  Cl: 108 (02-18 @ 15:18), 106 (02-18 @ 05:53), 107 (02-17 @ 06:51), 104 (02-15 @ 22:46)  CO2: 21 (02-18 @ 15:18), 22 (02-18 @ 05:53), 21 (02-17 @ 06:51), 22 (02-15 @ 22:46)  BUN: 20 (02-18 @ 15:18), 22 (02-18 @ 05:53), 19 (02-17 @ 06:51), 13 (02-15 @ 22:46)  Cr: 0.71 (02-18 @ 15:18), 0.81 (02-18 @ 05:53), 0.76 (02-17 @ 06:51), 0.79 (02-15 @ 22:46)  Glu: 137(02-18 @ 15:18), 158(02-18 @ 05:53), 205(02-17 @ 06:51), 113(02-15 @ 22:46)    Hgb: 8.3 (02-18 @ 15:18), 9.3 (02-18 @ 05:53), 7.6 (02-17 @ 09:06), 9.2 (02-15 @ 22:46)  Hct: 26.6 (02-18 @ 15:18), 29.3 (02-18 @ 05:53), 27.1 (02-17 @ 09:06), 29.3 (02-15 @ 22:46)  WBC: 11.1 (02-18 @ 15:18), 12.2 (02-18 @ 05:53), 5.91 (02-17 @ 09:06), 8.6 (02-15 @ 22:46)  Plt: 247 (02-18 @ 15:18), 294 (02-18 @ 05:53), 291 (02-17 @ 09:06), 323 (02-15 @ 22:46)      IMAGING:   Recent imaging studies were reviewed.    MEDICATIONS:  acetaminophen   Tablet .. 650 milliGRAM(s) Oral every 6 hours PRN  ceFAZolin   IVPB 2000 milliGRAM(s) IV Intermittent every 8 hours  dexamethasone     Tablet 4 milliGRAM(s) Oral every 6 hours  dextrose 40% Gel 15 Gram(s) Oral once PRN  dextrose 5%. 1000 milliLiter(s) IV Continuous <Continuous>  dextrose 50% Injectable 12.5 Gram(s) IV Push once  dextrose 50% Injectable 25 Gram(s) IV Push once  dextrose 50% Injectable 25 Gram(s) IV Push once  docusate sodium 100 milliGRAM(s) Oral three times a day  glucagon  Injectable 1 milliGRAM(s) IntraMuscular once PRN  insulin lispro (HumaLOG) corrective regimen sliding scale   SubCutaneous three times a day before meals  insulin lispro (HumaLOG) corrective regimen sliding scale   SubCutaneous at bedtime  levETIRAcetam 500 milliGRAM(s) Oral every 12 hours  ondansetron Injectable 4 milliGRAM(s) IV Push every 6 hours PRN  senna 2 Tablet(s) Oral at bedtime PRN  sodium chloride 0.9% with potassium chloride 20 mEq/L 1000 milliLiter(s) IV Continuous <Continuous>    EXAMINATION:  Awake, Alert, AOX3  PERRL, EOMI, Face equal, Tongue m/l  5/5 throughout, no drift  Cards:  RRR  Respiratory:  no respiratory distress  Adomen:  soft  Extremities:  no edema  Skin:  warm/dry

## 2019-02-18 NOTE — PROGRESS NOTE ADULT - SUBJECTIVE AND OBJECTIVE BOX
VASCULAR SURGERY PROGRESS NOTE      Subjective:  Patient had IVC filter placed overnight. This AM pt feels well overall        Objective:    PE:  General: WN/WD NAD  Respiratory: CTA B/L  Abdominal: Soft, NT, ND no palpable mass  Ext: Palpable DP/PT pulses bilaterally    Vital Signs Last 24 Hrs  T(C): 36.5 (18 Feb 2019 08:23), Max: 36.5 (18 Feb 2019 00:09)  T(F): 97.7 (18 Feb 2019 08:10), Max: 97.7 (18 Feb 2019 00:09)  HR: 76 (18 Feb 2019 08:23) (76 - 93)  BP: 101/62 (18 Feb 2019 08:23) (101/62 - 132/67)  BP(mean): --  RR: 18 (18 Feb 2019 08:23) (18 - 18)  SpO2: 96% (18 Feb 2019 08:23) (94% - 96%)    I&O's Detail    17 Feb 2019 07:01  -  18 Feb 2019 07:00  --------------------------------------------------------  IN:    Oral Fluid: 240 mL  Total IN: 240 mL    OUT:    Voided: 900 mL  Total OUT: 900 mL    Total NET: -660 mL          Daily Height in cm: 147.32 (18 Feb 2019 08:23)    Daily     MEDICATIONS  (STANDING):  dexamethasone  Injectable 4 milliGRAM(s) IV Push every 6 hours  docusate sodium 100 milliGRAM(s) Oral three times a day  insulin lispro (HumaLOG) corrective regimen sliding scale   SubCutaneous every 6 hours  levETIRAcetam 500 milliGRAM(s) Oral every 12 hours  levothyroxine 88 MICROGram(s) Oral daily  pantoprazole    Tablet 40 milliGRAM(s) Oral before breakfast  sodium chloride 0.9% with potassium chloride 20 mEq/L 1000 milliLiter(s) (75 mL/Hr) IV Continuous <Continuous>  verapamil  milliGRAM(s) Oral daily    MEDICATIONS  (PRN):  acetaminophen   Tablet .. 650 milliGRAM(s) Oral every 6 hours PRN Temp greater or equal to 38C (100.4F), Mild Pain (1 - 3)  ondansetron Injectable 4 milliGRAM(s) IV Push every 6 hours PRN Nausea and/or Vomiting  oxyCODONE    IR 5 milliGRAM(s) Oral every 6 hours PRN Moderate Pain (4 - 6)  senna 2 Tablet(s) Oral at bedtime PRN Constipation      LABS:                        9.3    12.2  )-----------( 294      ( 18 Feb 2019 05:53 )             29.3     02-18    140  |  106  |  22  ----------------------------<  158<H>  4.8   |  22  |  0.81    Ca    8.7      18 Feb 2019 05:53      PT/INR - ( 17 Feb 2019 09:04 )   PT: 11.2 sec;   INR: 0.98 ratio         PTT - ( 17 Feb 2019 09:04 )  PTT:24.4 sec      RADIOLOGY & ADDITIONAL STUDIES: VASCULAR SURGERY PROGRESS NOTE      Subjective:  Patient had IVC filter placed overnight. This AM pt feels well overall    Objective: pt w/o c/o     PE:  General: WN/WD NAD  Respiratory: CTA B/L  Abdominal: Soft, NT, ND no palpable mass  Ext: Palpable DP/PT pulses bilaterally mild to mod tristan le edema remains     Vital Signs Last 24 Hrs  T(C): 36.5 (18 Feb 2019 08:23), Max: 36.5 (18 Feb 2019 00:09)  T(F): 97.7 (18 Feb 2019 08:10), Max: 97.7 (18 Feb 2019 00:09)  HR: 76 (18 Feb 2019 08:23) (76 - 93)  BP: 101/62 (18 Feb 2019 08:23) (101/62 - 132/67)  BP(mean): --  RR: 18 (18 Feb 2019 08:23) (18 - 18)  SpO2: 96% (18 Feb 2019 08:23) (94% - 96%)    I&O's Detail    17 Feb 2019 07:01  -  18 Feb 2019 07:00  --------------------------------------------------------  IN:    Oral Fluid: 240 mL  Total IN: 240 mL    OUT:    Voided: 900 mL  Total OUT: 900 mL    Total NET: -660 mL    Daily Height in cm: 147.32 (18 Feb 2019 08:23)      MEDICATIONS  (STANDING):  dexamethasone  Injectable 4 milliGRAM(s) IV Push every 6 hours  docusate sodium 100 milliGRAM(s) Oral three times a day  insulin lispro (HumaLOG) corrective regimen sliding scale   SubCutaneous every 6 hours  levETIRAcetam 500 milliGRAM(s) Oral every 12 hours  levothyroxine 88 MICROGram(s) Oral daily  pantoprazole    Tablet 40 milliGRAM(s) Oral before breakfast  sodium chloride 0.9% with potassium chloride 20 mEq/L 1000 milliLiter(s) (75 mL/Hr) IV Continuous <Continuous>  verapamil  milliGRAM(s) Oral daily    MEDICATIONS  (PRN):  acetaminophen   Tablet .. 650 milliGRAM(s) Oral every 6 hours PRN Temp greater or equal to 38C (100.4F), Mild Pain (1 - 3)  ondansetron Injectable 4 milliGRAM(s) IV Push every 6 hours PRN Nausea and/or Vomiting  oxyCODONE    IR 5 milliGRAM(s) Oral every 6 hours PRN Moderate Pain (4 - 6)  senna 2 Tablet(s) Oral at bedtime PRN Constipation      LABS:                        9.3    12.2  )-----------( 294      ( 18 Feb 2019 05:53 )             29.3     02-18    140  |  106  |  22  ----------------------------<  158<H>  4.8   |  22  |  0.81    Ca    8.7      18 Feb 2019 05:53      PT/INR - ( 17 Feb 2019 09:04 )   PT: 11.2 sec;   INR: 0.98 ratio         PTT - ( 17 Feb 2019 09:04 )  PTT:24.4 sec

## 2019-02-18 NOTE — BRIEF OPERATIVE NOTE - PROCEDURE
<<-----Click on this checkbox to enter Procedure Brain surgery  02/18/2019  Left craniotomy for tumor resection  Active  [id number]

## 2019-02-19 LAB
ANION GAP SERPL CALC-SCNC: 10 MMOL/L — SIGNIFICANT CHANGE UP (ref 5–17)
BUN SERPL-MCNC: 18 MG/DL — SIGNIFICANT CHANGE UP (ref 7–23)
CALCIUM SERPL-MCNC: 8.3 MG/DL — LOW (ref 8.4–10.5)
CHLORIDE SERPL-SCNC: 109 MMOL/L — HIGH (ref 96–108)
CO2 SERPL-SCNC: 21 MMOL/L — LOW (ref 22–31)
CREAT SERPL-MCNC: 0.69 MG/DL — SIGNIFICANT CHANGE UP (ref 0.5–1.3)
GLUCOSE BLDC GLUCOMTR-MCNC: 115 MG/DL — HIGH (ref 70–99)
GLUCOSE BLDC GLUCOMTR-MCNC: 138 MG/DL — HIGH (ref 70–99)
GLUCOSE BLDC GLUCOMTR-MCNC: 143 MG/DL — HIGH (ref 70–99)
GLUCOSE BLDC GLUCOMTR-MCNC: 186 MG/DL — HIGH (ref 70–99)
GLUCOSE SERPL-MCNC: 126 MG/DL — HIGH (ref 70–99)
HCT VFR BLD CALC: 24.8 % — LOW (ref 34.5–45)
HCT VFR BLD CALC: 29 % — LOW (ref 34.5–45)
HGB BLD-MCNC: 7.3 G/DL — LOW (ref 11.5–15.5)
HGB BLD-MCNC: 9.3 G/DL — LOW (ref 11.5–15.5)
MCHC RBC-ENTMCNC: 21.1 PG — LOW (ref 27–34)
MCHC RBC-ENTMCNC: 23.6 PG — LOW (ref 27–34)
MCHC RBC-ENTMCNC: 29.6 GM/DL — LOW (ref 32–36)
MCHC RBC-ENTMCNC: 32.2 GM/DL — SIGNIFICANT CHANGE UP (ref 32–36)
MCV RBC AUTO: 71.2 FL — LOW (ref 80–100)
MCV RBC AUTO: 73.4 FL — LOW (ref 80–100)
PLATELET # BLD AUTO: 214 K/UL — SIGNIFICANT CHANGE UP (ref 150–400)
PLATELET # BLD AUTO: 233 K/UL — SIGNIFICANT CHANGE UP (ref 150–400)
POTASSIUM SERPL-MCNC: 4.9 MMOL/L — SIGNIFICANT CHANGE UP (ref 3.5–5.3)
POTASSIUM SERPL-SCNC: 4.9 MMOL/L — SIGNIFICANT CHANGE UP (ref 3.5–5.3)
RBC # BLD: 3.48 M/UL — LOW (ref 3.8–5.2)
RBC # BLD: 3.95 M/UL — SIGNIFICANT CHANGE UP (ref 3.8–5.2)
RBC # FLD: 16.7 % — HIGH (ref 10.3–14.5)
RBC # FLD: 17.9 % — HIGH (ref 10.3–14.5)
SODIUM SERPL-SCNC: 140 MMOL/L — SIGNIFICANT CHANGE UP (ref 135–145)
WBC # BLD: 9.4 K/UL — SIGNIFICANT CHANGE UP (ref 3.8–10.5)
WBC # BLD: 9.4 K/UL — SIGNIFICANT CHANGE UP (ref 3.8–10.5)
WBC # FLD AUTO: 9.4 K/UL — SIGNIFICANT CHANGE UP (ref 3.8–10.5)
WBC # FLD AUTO: 9.4 K/UL — SIGNIFICANT CHANGE UP (ref 3.8–10.5)

## 2019-02-19 PROCEDURE — 99232 SBSQ HOSP IP/OBS MODERATE 35: CPT

## 2019-02-19 PROCEDURE — 99233 SBSQ HOSP IP/OBS HIGH 50: CPT

## 2019-02-19 PROCEDURE — 70450 CT HEAD/BRAIN W/O DYE: CPT | Mod: 26

## 2019-02-19 RX ORDER — CALCIUM GLUCONATE 100 MG/ML
1 VIAL (ML) INTRAVENOUS ONCE
Qty: 0 | Refills: 0 | Status: COMPLETED | OUTPATIENT
Start: 2019-02-19 | End: 2019-02-19

## 2019-02-19 RX ORDER — LEVOTHYROXINE SODIUM 125 MCG
88 TABLET ORAL DAILY
Qty: 0 | Refills: 0 | Status: DISCONTINUED | OUTPATIENT
Start: 2019-02-19 | End: 2019-02-22

## 2019-02-19 RX ADMIN — Medication 4 MILLIGRAM(S): at 00:00

## 2019-02-19 RX ADMIN — Medication 400 GRAM(S): at 07:05

## 2019-02-19 RX ADMIN — SENNA PLUS 2 TABLET(S): 8.6 TABLET ORAL at 22:03

## 2019-02-19 RX ADMIN — LEVETIRACETAM 500 MILLIGRAM(S): 250 TABLET, FILM COATED ORAL at 18:07

## 2019-02-19 RX ADMIN — Medication 100 MILLIGRAM(S): at 00:32

## 2019-02-19 RX ADMIN — Medication 4 MILLIGRAM(S): at 12:45

## 2019-02-19 RX ADMIN — Medication 88 MICROGRAM(S): at 06:04

## 2019-02-19 RX ADMIN — Medication 4 MILLIGRAM(S): at 06:05

## 2019-02-19 RX ADMIN — Medication 4 MILLIGRAM(S): at 18:07

## 2019-02-19 RX ADMIN — Medication 100 MILLIGRAM(S): at 22:03

## 2019-02-19 RX ADMIN — Medication 100 MILLIGRAM(S): at 06:04

## 2019-02-19 RX ADMIN — Medication 4 MILLIGRAM(S): at 22:03

## 2019-02-19 RX ADMIN — LEVETIRACETAM 500 MILLIGRAM(S): 250 TABLET, FILM COATED ORAL at 06:04

## 2019-02-19 NOTE — PROGRESS NOTE ADULT - ASSESSMENT
83F s/p crani for cyst resection, doing well, no complaints  - CTH in AM  - q1hr neurochecks  - MRI brain   - Taper Decadron  - Cont AEDs

## 2019-02-19 NOTE — PROGRESS NOTE ADULT - ASSESSMENT
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

## 2019-02-19 NOTE — PROGRESS NOTE ADULT - SUBJECTIVE AND OBJECTIVE BOX
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 Ommaya exchange 11 days due to catheter blockage. Here now for RUE and RLE numbness x1 days w/ mild headache. No fevers, nausea/vomiting, visual changes, confusion, chest pain, shortness of breath. PAtient fell today because she was unable to "feel" her leg.  Ommaya attempted to be tapped today, however unable to withdraw significant cyst fluid  12/18/18 Ommaya placement  1/5/19 Gamma knife SRS  2/5/19 Ommaya revision (15 Feb 2019 23:58)  Presented for cerebral lesion removal.    On admission, the patient was:  GCS: 15.    Overnight Events:     ROS: negative [] unable to obtain as patient is comatose/intubated/aphasic []     VITALS:   T(C): 36.2 (02-19-19 @ 08:00), Max: 36.8 (02-18-19 @ 22:00)  HR: 68 (02-19-19 @ 08:00) (66 - 90)  BP: 144/78 (02-19-19 @ 08:00) (101/62 - 155/73)  RR: 14 (02-19-19 @ 08:00) (14 - 18)  SpO2: 98% (02-19-19 @ 08:00) (88% - 100%)    02-18-19 @ 07:01  -  02-19-19 @ 07:00  --------------------------------------------------------  IN: 1455 mL / OUT: 810 mL / NET: 645 mL      LABS:  ABG - ( 18 Feb 2019 10:38 )  pH, Arterial: 7.39  pH, Blood: x     /  pCO2: 38    /  pO2: 361   / HCO3: 23    / Base Excess: -1.5  /  SaO2: 100                7.3    9.4   )-----------( 233      ( 19 Feb 2019 03:13 )             24.8     02-18    142  |  108  |  20  ----------------------------<  137<H>  4.3   |  21<L>  |  0.71    Ca    8.2<L>      18 Feb 2019 15:18    TPro  6.5  /  Alb  3.3  /  TBili  0.1<L>  /  DBili  x   /  AST  10  /  ALT  12  /  AlkPhos  77  02-18    PT/INR - ( 17 Feb 2019 09:04 )   PT: 11.2 sec;   INR: 0.98 ratio         PTT - ( 17 Feb 2019 09:04 )  PTT:24.4 sec  MEDS:  MEDICATIONS  (STANDING):  dexamethasone     Tablet 4 milliGRAM(s) Oral every 6 hours  dextrose 5%. 1000 milliLiter(s) (50 mL/Hr) IV Continuous <Continuous>  dextrose 50% Injectable 12.5 Gram(s) IV Push once  dextrose 50% Injectable 25 Gram(s) IV Push once  dextrose 50% Injectable 25 Gram(s) IV Push once  docusate sodium 100 milliGRAM(s) Oral three times a day  insulin lispro (HumaLOG) corrective regimen sliding scale   SubCutaneous three times a day before meals  insulin lispro (HumaLOG) corrective regimen sliding scale   SubCutaneous at bedtime  levETIRAcetam 500 milliGRAM(s) Oral every 12 hours  levothyroxine 88 MICROGram(s) Oral daily    EXAMINATION:  Awake, Alert, AOX3  PERRL, EOMI, Face equal, Tongue m/l  5/5 throughout, no drift  Cards:  RRR  Respiratory:  no respiratory distress  Adomen:  soft  Extremities:  no edema  Skin:  warm/dry · Chief Complaint: The patient is a 83y Female complaining of  · HPI Objective Statement: Yola Vergara DO: 83-year old woman w/ 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 No fevers, nausea/vomiting, visual changes, confusion, chest pain, shortness of breath.    PAST MEDICAL/SURGICAL/FAMILY/SOCIAL HISTORY:    Past Medical History:  Essential (primary) hypertension    Hypothyroidism, unspecified type    Renal cancer.     Tobacco Usage:  · Tobacco Usage	Unknown if ever smoked    ALLERGIES AND HOME MEDICATIONS:   Allergies:        Allergies:  	No Known Allergies:     Home Medications:   * Patient Currently Takes Medications as of 05-Feb-2019 14:20 documented in Structured Notes  · 	Keflex 500 mg oral capsule: 1 cap(s) orally 4 times a day (before meals and at bedtime)   · 	oxyCODONE 5 mg oral tablet: 1 tab(s) orally every 6 hours, As needed, Moderate to severe Pain (4 - 6) MDD:4  · 	pantoprazole 40 mg oral delayed release tablet: 1 tab(s) orally once a day   · 	levETIRAcetam 500 mg oral tablet: 1 tab(s) orally 2 times a day  · 	verapamil 120 mg/24 hours oral capsule, extended release: 1 cap(s) orally once a day  · 	Synthroid 88 mcg (0.088 mg) oral tablet: 1 tab(s) orally once a day        Events: crani for tumor resection      REVIEW OF SYSTEMS: [ ] Unable to Assess due to neurologic exam   [x ] All ROS addressed below are non-contributory, except:  Neuro: [ ] Headache [ ] Back pain [ ] Numbness [ ] Weakness [ ] Ataxia [ ] Dizziness [ ] Aphasia [ ] Dysarthria [ ] Visual disturbance  Resp: [ ] Shortness of breath/dyspnea, [ ] Orthopnea [ ] Cough  CV: [ ] Chest pain [ ] Palpitation [ ] Lightheadedness [ ] Syncope  Renal: [ ] Thirst [ ] Edema  GI: [ ] Nausea [ ] Emesis [ ] Abdominal pain [ ] Constipation [ ] Diarrhea  Hem: [ ] Hematemesis [ ] bright red blood per rectum  ID: [ ] Fever [ ] Chills [ ] Dysuria  ENT: [ ] Rhinorrhea        PHYSICAL EXAM:   · Physical Examination: Yola Libov, DO:   	Gen: Well appearing, NAD  	Head: NCAT  	HEENT: PERRL, MMM, normal conjunctiva, anicteric, neck supple  	Lung: CTAB, no adventitious sounds  	CV: RRR, no murmurs  	Abd: soft, NTND, no rebound or guarding, no CVAT  	MSK: No edema, no visible deformities  	Neuro: CN II-XII grossly intact. 5/5 strength and normal sensation in all extremities.   	Skin: Warm and dry, no evidence of rash  	Psych: normal mood and affect        VITALS:   T(C): 36.2 (02-19-19 @ 08:00), Max: 36.8 (02-18-19 @ 22:00)  HR: 68 (02-19-19 @ 08:00) (66 - 90)  BP: 144/78 (02-19-19 @ 08:00) (101/62 - 155/73)  RR: 14 (02-19-19 @ 08:00) (14 - 18)  SpO2: 98% (02-19-19 @ 08:00) (88% - 100%)    02-18-19 @ 07:01  -  02-19-19 @ 07:00  --------------------------------------------------------  IN: 1455 mL / OUT: 810 mL / NET: 645 mL      LABS:  ABG - ( 18 Feb 2019 10:38 )  pH, Arterial: 7.39  pH, Blood: x     /  pCO2: 38    /  pO2: 361   / HCO3: 23    / Base Excess: -1.5  /  SaO2: 100                7.3    9.4   )-----------( 233      ( 19 Feb 2019 03:13 )             24.8     02-18    142  |  108  |  20  ----------------------------<  137<H>  4.3   |  21<L>  |  0.71    Ca    8.2<L>      18 Feb 2019 15:18    TPro  6.5  /  Alb  3.3  /  TBili  0.1<L>  /  DBili  x   /  AST  10  /  ALT  12  /  AlkPhos  77  02-18    PT/INR - ( 17 Feb 2019 09:04 )   PT: 11.2 sec;   INR: 0.98 ratio         PTT - ( 17 Feb 2019 09:04 )  PTT:24.4 sec  MEDS:  MEDICATIONS  (STANDING):  dexamethasone     Tablet 4 milliGRAM(s) Oral every 6 hours  dextrose 5%. 1000 milliLiter(s) (50 mL/Hr) IV Continuous <Continuous>  dextrose 50% Injectable 12.5 Gram(s) IV Push once  dextrose 50% Injectable 25 Gram(s) IV Push once  dextrose 50% Injectable 25 Gram(s) IV Push once  docusate sodium 100 milliGRAM(s) Oral three times a day  insulin lispro (HumaLOG) corrective regimen sliding scale   SubCutaneous three times a day before meals  insulin lispro (HumaLOG) corrective regimen sliding scale   SubCutaneous at bedtime  levETIRAcetam 500 milliGRAM(s) Oral every 12 hours  levothyroxine 88 MICROGram(s) Oral daily

## 2019-02-19 NOTE — PROGRESS NOTE ADULT - PROBLEM SELECTOR PLAN 1
Pulmonary emboli involving the right main pulmonary artery with extension   into the right lower lobe lobar, segmental and subsegmental coronary   arteries.   -Eventual full AC when cleared by neurosurgery.

## 2019-02-19 NOTE — PROGRESS NOTE ADULT - ASSESSMENT
ASSESSMENT:   S/p L crani for mts lesion resection.  H/o RCC with lung + cerebral mts  BL LE DVT, PE -  hemodynam stable. S/p IVC.    NEURO:  CT Head in AM, MRI post-op within 48 hrs, Pain control  Cont Dexa and Keppra  Activity: [] OOB as tolerated [] Bedrest [] PT [] OT [] PMNR    PULM:  IS  PE - No signs of RV strain on EKG, CT, hemodynam stable.    CV:  -150mmHg, d/c a-line in AM if hemodynamically stable    RENAL:  IVF until good PO intake    GI:  Diet: Dysphagia screen and then advance diet as tolerated  GI prophylaxis [x not indicated [] PPI [] other:  Bowel regimen [x] colace [x] senna [] other:    ENDO:   Goal euglycemia (-180)    HEME/ONC:  VTE prophylaxis: [x] SCDs [] chemoprophylaxis [x] hold chemoprophylaxis due to: fresh post op  [] high risk of DVT/PE on admission due to:  Will need AC when cleared by NSGy.    ID:  Marie-op antibiotics    MISC:    SOCIAL/FAMILY:  [x] awaiting [] updated at bedside [] family meeting    CODE STATUS:  [x] Full Code [] DNR [] DNI [] Palliative/Comfort Care    DISPOSITION:  [x] ICU [] Stroke Unit [] Floor [] EMU [] RCU [] PCU    [x] Patient is at high risk of neurologic deterioration/death due to: Post-operative hemorrhage/stroke, brain compression, cerebral edema    Time spent: 45 critical care minutes ASSESSMENT:   S/p L crani for mts lesion resection.  H/o RCC with lung + cerebral mts  BL LE DVT, PE -  hemodynam stable. S/p IVC.    NEURO:  CT Head in AM with post-op IPH, MRI post-op within 48 hrs, Pain control  REPEAT HEAD CT TOMORROW   Cont Dexa and Keppra  Activity: [] OOB as tolerated [] Bedrest [X] PT [X] OT [] PMNR    PULM:  IS  PE - No signs of RV strain on EKG, CT, hemodynam stable. contraindicated to give full dose anticoagulation at this point in time given IPH    CV:  -150mmHg, d/c a-line     RENAL:  IVl    GI:  Diet: Dysphagia screen and then advance diet as tolerated  GI prophylaxis [x not indicated [] PPI [] other:  Bowel regimen [x] colace [x] senna [] other:    ENDO:   Goal euglycemia (-180)    HEME/ONC:  VTE prophylaxis: [x] SCDs [] chemoprophylaxis [x] hold chemoprophylaxis due to: fresh post op  with intraop bed hemorrhage, repeat head ct tomorrow if stable, will start heparin sc [] high risk of DVT/PE on admission due to:  Will need AC when cleared by NSGy.    ID:  Marie-op antibiotics    MISC:    SOCIAL/FAMILY:  [x] awaiting [] updated at bedside [] family meeting    CODE STATUS:  [x] Full Code [] DNR [] DNI [] Palliative/Comfort Care    DISPOSITION:  [] ICU [] Stroke Unit [x] Floor if ok with NS  [] EMU [] RCU [] PCU

## 2019-02-19 NOTE — PROGRESS NOTE ADULT - ASSESSMENT
82 yo female with PMH of RCC with cystic brain metastasis s/p Ommaya placement with weekly drainage. Ommaya exchange 11 days ago due to catheter blockage. Presents to ED with RUE and RLE numbness x1 day with mild headache. Ommaya attempted to be tapped on day of admission, however was unable to withdraw significant cyst fluid. CT chest multiple metastatic nodules bilaterally and right mainstem PE. IVC filter placed 2/17. S/p craniotomy 2/18. Sats 98% on 2LNC.

## 2019-02-19 NOTE — PROGRESS NOTE ADULT - SUBJECTIVE AND OBJECTIVE BOX
Follow-up Pulm Progress Note    No new respiratory events overnight.    Sats 95-97% on RA.  Denies SOB/CP.     Medications:  MEDICATIONS  (STANDING):  dexamethasone     Tablet 4 milliGRAM(s) Oral every 6 hours  dextrose 5%. 1000 milliLiter(s) (50 mL/Hr) IV Continuous <Continuous>  dextrose 50% Injectable 12.5 Gram(s) IV Push once  dextrose 50% Injectable 25 Gram(s) IV Push once  dextrose 50% Injectable 25 Gram(s) IV Push once  docusate sodium 100 milliGRAM(s) Oral three times a day  insulin lispro (HumaLOG) corrective regimen sliding scale   SubCutaneous three times a day before meals  insulin lispro (HumaLOG) corrective regimen sliding scale   SubCutaneous at bedtime  levETIRAcetam 500 milliGRAM(s) Oral every 12 hours  levothyroxine 88 MICROGram(s) Oral daily    MEDICATIONS  (PRN):  acetaminophen   Tablet .. 650 milliGRAM(s) Oral every 6 hours PRN Temp greater or equal to 38C (100.4F), Mild Pain (1 - 3)  dextrose 40% Gel 15 Gram(s) Oral once PRN Blood Glucose LESS THAN 70 milliGRAM(s)/deciliter  glucagon  Injectable 1 milliGRAM(s) IntraMuscular once PRN Glucose LESS THAN 70 milligrams/deciliter  ondansetron Injectable 4 milliGRAM(s) IV Push every 6 hours PRN Nausea and/or Vomiting  senna 2 Tablet(s) Oral at bedtime PRN Constipation          Vital Signs Last 24 Hrs  T(C): 36.5 (19 Feb 2019 12:00), Max: 36.8 (18 Feb 2019 22:00)  T(F): 97.7 (19 Feb 2019 12:00), Max: 98.2 (18 Feb 2019 22:00)  HR: 75 (19 Feb 2019 12:00) (60 - 83)  BP: 127/58 (19 Feb 2019 12:00) (117/58 - 155/73)  BP(mean): 88 (19 Feb 2019 09:00) (84 - 105)  RR: 18 (19 Feb 2019 12:00) (14 - 18)  SpO2: 97% (19 Feb 2019 12:00) (88% - 100%)    ABG - ( 18 Feb 2019 10:38 )  pH, Arterial: 7.39  pH, Blood: x     /  pCO2: 38    /  pO2: 361   / HCO3: 23    / Base Excess: -1.5  /  SaO2: 100                   02-18 @ 07:01  -  02-19 @ 07:00  --------------------------------------------------------  IN: 1455 mL / OUT: 810 mL / NET: 645 mL          LABS:                        9.3    9.4   )-----------( 214      ( 19 Feb 2019 11:18 )             29.0     02-19    140  |  109<H>  |  18  ----------------------------<  126<H>  4.9   |  21<L>  |  0.69    Ca    8.3<L>      19 Feb 2019 11:18    TPro  6.5  /  Alb  3.3  /  TBili  0.1<L>  /  DBili  x   /  AST  10  /  ALT  12  /  AlkPhos  77  02-18          CAPILLARY BLOOD GLUCOSE      POCT Blood Glucose.: 143 mg/dL (19 Feb 2019 12:49)        Physical Examination:  PULM: Clear to auscultation bilaterally, no significant sputum production  CVS: RRR    RADIOLOGY REVIEWED  CXR:     CTA chest:  LUNGS AND LARGE AIRWAYS: Patent central airways.  Interval progression of   bilateral metastatic disease. For reference, a right upper lobe nodule   currently measures 2.8 x 2.3 cm (5:22), previously 2.5 x 2.3 cm.  PLEURA: No pleural effusion.      IMPRESSION:  Pulmonary emboli involving the right main pulmonary artery with extension   into the right lower lobe lobar, segmental and subsegmental coronary   arteries. No evidence of right heart strain.    Overall progression of metastatic disease in thelungs, left fifth rib,   and the upper abdomen.          TTE:

## 2019-02-19 NOTE — PROGRESS NOTE ADULT - SUBJECTIVE AND OBJECTIVE BOX
Patient is a 83y old  Female who presents with a chief complaint of brain tumor (19 Feb 2019 14:48)      Vascular Surgery Attending Progress Note    Interval HPI: pt w/o c/o     Medications:  acetaminophen   Tablet .. 650 milliGRAM(s) Oral every 6 hours PRN  dexamethasone     Tablet 4 milliGRAM(s) Oral every 6 hours  dextrose 40% Gel 15 Gram(s) Oral once PRN  dextrose 5%. 1000 milliLiter(s) IV Continuous <Continuous>  dextrose 50% Injectable 12.5 Gram(s) IV Push once  dextrose 50% Injectable 25 Gram(s) IV Push once  dextrose 50% Injectable 25 Gram(s) IV Push once  docusate sodium 100 milliGRAM(s) Oral three times a day  glucagon  Injectable 1 milliGRAM(s) IntraMuscular once PRN  insulin lispro (HumaLOG) corrective regimen sliding scale   SubCutaneous three times a day before meals  insulin lispro (HumaLOG) corrective regimen sliding scale   SubCutaneous at bedtime  levETIRAcetam 500 milliGRAM(s) Oral every 12 hours  levothyroxine 88 MICROGram(s) Oral daily  ondansetron Injectable 4 milliGRAM(s) IV Push every 6 hours PRN  senna 2 Tablet(s) Oral at bedtime PRN      Vital Signs Last 24 Hrs  T(C): 36.4 (19 Feb 2019 19:42), Max: 36.8 (18 Feb 2019 22:00)  T(F): 97.6 (19 Feb 2019 19:42), Max: 98.2 (18 Feb 2019 22:00)  HR: 80 (19 Feb 2019 19:42) (60 - 80)  BP: 115/66 (19 Feb 2019 19:42) (115/66 - 144/78)  BP(mean): 88 (19 Feb 2019 09:00) (85 - 102)  RR: 17 (19 Feb 2019 19:42) (14 - 18)  SpO2: 98% (19 Feb 2019 19:42) (96% - 98%)  I&O's Summary    18 Feb 2019 07:01  -  19 Feb 2019 07:00  --------------------------------------------------------  IN: 1455 mL / OUT: 810 mL / NET: 645 mL    19 Feb 2019 07:01  -  19 Feb 2019 20:28  --------------------------------------------------------  IN: 440 mL / OUT: 200 mL / NET: 240 mL        Physical Exam:  Vascular:  stable     LABS:                        9.3    9.4   )-----------( 214      ( 19 Feb 2019 11:18 )             29.0     02-19    140  |  109<H>  |  18  ----------------------------<  126<H>  4.9   |  21<L>  |  0.69    Ca    8.3<L>      19 Feb 2019 11:18    TPro  6.5  /  Alb  3.3  /  TBili  0.1<L>  /  DBili  x   /  AST  10  /  ALT  12  /  AlkPhos  77  02-18        MANJINDER KING MD  [phone]

## 2019-02-20 ENCOUNTER — APPOINTMENT (OUTPATIENT)
Dept: CT IMAGING | Facility: IMAGING CENTER | Age: 84
End: 2019-02-20

## 2019-02-20 ENCOUNTER — APPOINTMENT (OUTPATIENT)
Dept: NEUROSURGERY | Facility: CLINIC | Age: 84
End: 2019-02-20

## 2019-02-20 LAB
GLUCOSE BLDC GLUCOMTR-MCNC: 138 MG/DL — HIGH (ref 70–99)
GLUCOSE BLDC GLUCOMTR-MCNC: 142 MG/DL — HIGH (ref 70–99)
GLUCOSE BLDC GLUCOMTR-MCNC: 153 MG/DL — HIGH (ref 70–99)
GLUCOSE BLDC GLUCOMTR-MCNC: 158 MG/DL — HIGH (ref 70–99)
GLUCOSE BLDC GLUCOMTR-MCNC: 236 MG/DL — HIGH (ref 70–99)
SURGICAL PATHOLOGY STUDY: SIGNIFICANT CHANGE UP

## 2019-02-20 PROCEDURE — 99232 SBSQ HOSP IP/OBS MODERATE 35: CPT

## 2019-02-20 PROCEDURE — 99223 1ST HOSP IP/OBS HIGH 75: CPT | Mod: GC

## 2019-02-20 PROCEDURE — 70450 CT HEAD/BRAIN W/O DYE: CPT | Mod: 26

## 2019-02-20 RX ORDER — ENOXAPARIN SODIUM 100 MG/ML
40 INJECTION SUBCUTANEOUS AT BEDTIME
Qty: 0 | Refills: 0 | Status: DISCONTINUED | OUTPATIENT
Start: 2019-02-20 | End: 2019-02-22

## 2019-02-20 RX ADMIN — Medication 4 MILLIGRAM(S): at 04:52

## 2019-02-20 RX ADMIN — Medication 100 MILLIGRAM(S): at 04:53

## 2019-02-20 RX ADMIN — Medication 4 MILLIGRAM(S): at 23:43

## 2019-02-20 RX ADMIN — Medication 4 MILLIGRAM(S): at 18:07

## 2019-02-20 RX ADMIN — Medication 88 MICROGRAM(S): at 04:52

## 2019-02-20 RX ADMIN — Medication 1: at 13:35

## 2019-02-20 RX ADMIN — Medication 4 MILLIGRAM(S): at 12:37

## 2019-02-20 RX ADMIN — ENOXAPARIN SODIUM 40 MILLIGRAM(S): 100 INJECTION SUBCUTANEOUS at 23:44

## 2019-02-20 RX ADMIN — SENNA PLUS 2 TABLET(S): 8.6 TABLET ORAL at 23:43

## 2019-02-20 RX ADMIN — LEVETIRACETAM 500 MILLIGRAM(S): 250 TABLET, FILM COATED ORAL at 04:52

## 2019-02-20 RX ADMIN — Medication 100 MILLIGRAM(S): at 13:11

## 2019-02-20 RX ADMIN — LEVETIRACETAM 500 MILLIGRAM(S): 250 TABLET, FILM COATED ORAL at 18:06

## 2019-02-20 NOTE — CONSULT NOTE ADULT - ASSESSMENT
82 yo female with PMH of RCC with cystic brain metastasis s/p Ommaya placement with weekly drainage. Ommaya exchange 11 days ago due to catheter blockage. Presents to ED with RUE and RLE numbness x1 day with mild headache. Ommaya attempted to be tapped on day of admission, however was unable to withdraw significant cyst fluid. CT chest multiple metastatic nodules bilaterally and right mainstem PE. IVC filter placed 2/17. S/p craniotomy 2/18. Sats 98% on 2LNC.
83 year old woman w/ pmhx of clear cell RCC who presented with inability to walk/RLE weakness, now s/p craniotomy.     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 Navarrete as outpatient to start systemic treatment with TKI most likely pazopanib   - she will contact to make appointment after discharge     2) PE/DVT- s/p IVC filter  - renal cell malignancies have tendency to bleed especially with brain lesions  - therefore, agree with holding off on anticoagulation at the moment   - currently vitals stable     Feli Busby, PGY-4  Hematology-Oncology Fellow  [phone] ([place]) [phone] ([facility])
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 with plans for possible craniectomy tomorrow presents with DVT on screening ultrasound, vascular surgery consulted for possible IVC filter.     -Patient discussed with Dr. Francis. Would recommend IR consultation for IVC filter placement      Debbie Ochoa, PGY-3
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 Ommaya exchange 11 days due to catheter blockage. Here now for RUE and RLE numbness x1 days w/ mild headache. No fevers, nausea/vomiting, visual changes, confusion, chest pain, shortness of breath. PAtient fell today because she was unable to "feel" her leg.  Ommaya attempted to be tapped today, however unable to withdraw significant cyst fluid    ekg 1/19 reviewed: nsr  LE dopplers noted:   RIGHT: DVT in the peroneal and soleal veins.  LEFT: Thrombus in the greater saphenous vein extending into the common   femoral vein. Please note upon rescanning, echogenic thrombus is no   longer visualized and is concerning for superior migration of clot. If   there is clinical concern for pulmonary embolism, CTPA can be performed   for further evaluation.    #New LE DVT-  given brain mets and plan for craniotomy procedure tomorrow-anticoagulation contraindicated for now  rec- ivc filter placement  vascular cs fu  CTA as recommended to r/o PE/ suspected superior migration of clot (although pt w/out s/s of PE)    #Anemia: check rpt cbc  no e/o active bleeding  T&S    will continue to follow

## 2019-02-20 NOTE — PROGRESS NOTE ADULT - SUBJECTIVE AND OBJECTIVE BOX
Patient is a 83y old  Female who presents with a chief complaint of brain tumor (20 Feb 2019 17:42)      Vascular Surgery Attending Progress Note    Interval HPI: pt w/o c/o     Medications:  acetaminophen   Tablet .. 650 milliGRAM(s) Oral every 6 hours PRN  dexamethasone     Tablet 4 milliGRAM(s) Oral every 6 hours  dextrose 40% Gel 15 Gram(s) Oral once PRN  dextrose 5%. 1000 milliLiter(s) IV Continuous <Continuous>  dextrose 50% Injectable 12.5 Gram(s) IV Push once  dextrose 50% Injectable 25 Gram(s) IV Push once  dextrose 50% Injectable 25 Gram(s) IV Push once  docusate sodium 100 milliGRAM(s) Oral three times a day  enoxaparin Injectable 40 milliGRAM(s) SubCutaneous at bedtime  glucagon  Injectable 1 milliGRAM(s) IntraMuscular once PRN  insulin lispro (HumaLOG) corrective regimen sliding scale   SubCutaneous three times a day before meals  insulin lispro (HumaLOG) corrective regimen sliding scale   SubCutaneous at bedtime  levETIRAcetam 500 milliGRAM(s) Oral every 12 hours  levothyroxine 88 MICROGram(s) Oral daily  ondansetron Injectable 4 milliGRAM(s) IV Push every 6 hours PRN  senna 2 Tablet(s) Oral at bedtime PRN      Vital Signs Last 24 Hrs  T(C): 36.6 (20 Feb 2019 15:33), Max: 36.6 (19 Feb 2019 23:10)  T(F): 97.9 (20 Feb 2019 15:33), Max: 97.9 (19 Feb 2019 23:10)  HR: 80 (20 Feb 2019 15:33) (67 - 80)  BP: 124/64 (20 Feb 2019 15:33) (115/66 - 145/70)  BP(mean): --  RR: 18 (20 Feb 2019 15:33) (17 - 18)  SpO2: 96% (20 Feb 2019 15:33) (94% - 98%)  I&O's Summary    19 Feb 2019 07:01  -  20 Feb 2019 07:00  --------------------------------------------------------  IN: 440 mL / OUT: 200 mL / NET: 240 mL    20 Feb 2019 07:01  -  20 Feb 2019 19:19  --------------------------------------------------------  IN: 150 mL / OUT: 0 mL / NET: 150 mL      Physical Exam:  Neuro  A&Ox3 VSS  Vascular:  tristan le mild to mod edema stable     LABS:                        9.3    9.4   )-----------( 214      ( 19 Feb 2019 11:18 )             29.0     02-19    140  |  109<H>  |  18  ----------------------------<  126<H>  4.9   |  21<L>  |  0.69    Ca    8.3<L>      19 Feb 2019 11:18          MANJINDER KING MD  [phone]

## 2019-02-20 NOTE — CONSULT NOTE ADULT - SUBJECTIVE AND OBJECTIVE BOX
HPI:  83 year old woman w/ pmhx of clear cell RCC who presented with inability to walk. She reported right lower extremity weakness. No other neuro symptoms. She is s/p Ommay into cystic brain metastasis with ~qweekly drainage. Ommaya was attempted to be tapped, however unable to withdraw significant cyst fluid. Therefore underwent resection of lesion.   In terms of history, she was diagnosed with RCC and underwent nephrectomy in 2007. She had sparse follow up. However, she developed neuro symptoms end of 2018 with MRI at that time identifying left parietal cystic mass. Subsequent workup with CT scans showed bilateral pulm mets as well as pancreatic and adrenal masses. She underwent cyst fenestration and had placement of reservoir. She also had peritoneal biopsy which showed clear cell RCC. She followed with Dr. Navarrete who recommended starting systemic therapy with pazobinib. However, she has had recurrent hospitalizations due to worsening neuro symptoms and cyst reaccumulation. At present, pt has no complaints. Weakness has improved. No fevers, chills, dyspnea.       PAST MEDICAL & SURGICAL HISTORY:  Essential (primary) hypertension  Hypothyroidism, unspecified type  Renal cancer  History of nephrectomy: Left nephrectomy 2007      Allergies    No Known Allergies    Intolerances        MEDICATIONS  (STANDING):  dexamethasone     Tablet 4 milliGRAM(s) Oral every 6 hours  dextrose 5%. 1000 milliLiter(s) (50 mL/Hr) IV Continuous <Continuous>  dextrose 50% Injectable 12.5 Gram(s) IV Push once  dextrose 50% Injectable 25 Gram(s) IV Push once  dextrose 50% Injectable 25 Gram(s) IV Push once  docusate sodium 100 milliGRAM(s) Oral three times a day  enoxaparin Injectable 40 milliGRAM(s) SubCutaneous at bedtime  insulin lispro (HumaLOG) corrective regimen sliding scale   SubCutaneous three times a day before meals  insulin lispro (HumaLOG) corrective regimen sliding scale   SubCutaneous at bedtime  levETIRAcetam 500 milliGRAM(s) Oral every 12 hours  levothyroxine 88 MICROGram(s) Oral daily    MEDICATIONS  (PRN):  acetaminophen   Tablet .. 650 milliGRAM(s) Oral every 6 hours PRN Temp greater or equal to 38C (100.4F), Mild Pain (1 - 3)  dextrose 40% Gel 15 Gram(s) Oral once PRN Blood Glucose LESS THAN 70 milliGRAM(s)/deciliter  glucagon  Injectable 1 milliGRAM(s) IntraMuscular once PRN Glucose LESS THAN 70 milligrams/deciliter  ondansetron Injectable 4 milliGRAM(s) IV Push every 6 hours PRN Nausea and/or Vomiting  senna 2 Tablet(s) Oral at bedtime PRN Constipation      FAMILY HISTORY: nonsignificant, no malignancy history       SOCIAL HISTORY: No EtOH, no tobacco        VITALS:   T(F): 97.9 (02-20-19 @ 15:33), Max: 97.9 (02-19-19 @ 23:10)  HR: 80 (02-20-19 @ 15:33)  BP: 124/64 (02-20-19 @ 15:33)  RR: 18 (02-20-19 @ 15:33)  SpO2: 96% (02-20-19 @ 15:33)  Wt(kg): --    PHYSICAL EXAM    GENERAL: NAD, appears comfortable   HEAD:  Atraumatic, Normocephalic  EYES: EOMI, PERRLA, conjunctiva and sclera clear  NECK: Supple, No JVD  CHEST/LUNG: Clear to auscultation bilaterally; No wheeze  HEART: Regular rate and rhythm; No murmurs, rubs, or gallops  ABDOMEN: Soft, Nontender, Nondistended; Bowel sounds present  EXTREMITIES:  No clubbing, cyanosis, or edema  NEUROLOGY: non-focal  SKIN: No rashes or lesions    LABS:                         9.3    9.4   )-----------( 214      ( 19 Feb 2019 11:18 )             29.0     02-19    140  |  109<H>  |  18  ----------------------------<  126<H>  4.9   |  21<L>  |  0.69    Ca    8.3<L>      19 Feb 2019 11:18          .CSF CSF  01-04 @ 20:50   No growth  --    No polymorphonuclear cells seen  No organisms seen  by cytocentrifuge      .Urine Clean Catch (Midstream)  12-14 @ 08:10   Culture grew 3 or more types of organisms which indicate  collection contamination; consider recollection only if clinically  indicated.  --  --          IMAGING:

## 2019-02-20 NOTE — PROGRESS NOTE ADULT - ATTENDING COMMENTS
I have personally  seen and examined the patient today and have noted the findings and formulated the plan of care.
I have personally  seen and examined the patient today and have noted the findings and formulated the plan of care.
I have seen and examined the patient today and agree with  the  evaluation, assessment and plan of the surgical house officer

## 2019-02-20 NOTE — PROGRESS NOTE ADULT - SUBJECTIVE AND OBJECTIVE BOX
SUBJECTIVE: Patient seen and examined at bedside. Denies any complaints at this time.     OVERNIGHT EVENTS: Tx from NSCU    Vital Signs Last 24 Hrs  T(C): 36.4 (20 Feb 2019 12:42), Max: 36.6 (19 Feb 2019 23:10)  T(F): 97.5 (20 Feb 2019 12:42), Max: 97.9 (19 Feb 2019 23:10)  HR: 73 (20 Feb 2019 12:42) (67 - 80)  BP: 125/54 (20 Feb 2019 12:42) (115/66 - 145/70)  BP(mean): --  RR: 18 (20 Feb 2019 12:42) (17 - 18)  SpO2: 97% (20 Feb 2019 12:42) (94% - 98%)    PHYSICAL EXAM:    General: No Acute Distress     Neurological: Awake, alert oriented to person, place and time, Following Commands, PERRL, EOMI, Face Symmetrical, Speech Fluent, Moving all extremities, Muscle Strength normal in all four extremities, No Drift, Sensation to Light Touch Intact    Pulmonary: Clear to Auscultation, No Rales, No Rhonchi, No Wheezes     Cardiovascular: S1, S2, Regular Rate and Rhythm     Gastrointestinal: Soft, Nontender, Nondistended     Incision: c/d/i     LABS:                        9.3    9.4   )-----------( 214      ( 19 Feb 2019 11:18 )             29.0    02-19    140  |  109<H>  |  18  ----------------------------<  126<H>  4.9   |  21<L>  |  0.69    Ca    8.3<L>      19 Feb 2019 11:18    TPro  6.5  /  Alb  3.3  /  TBili  0.1<L>  /  DBili  x   /  AST  10  /  ALT  12  /  AlkPhos  77  02-18      DIET: CCD diet     IMAGING: < from: CT Head No Cont (02.19.19 @ 09:18) >  Interval left parietal craniotomy. 2.6 x 1.2 cm parenchymal hemorrhage in   the left parietal surgical bed. Left frontoparietal vasogenic edema is   similar.    Minimal rightward midline shift is similar.     Stable ventricular size, no hydrocephalus. No effacement of the basal   cisterns.    < end of copied text >

## 2019-02-20 NOTE — PROGRESS NOTE ADULT - ASSESSMENT
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 Ommaya exchange 11 days due to catheter blockage. Here now for RUE and RLE numbness x1 days w/ mild headache. No fevers, nausea/vomiting, visual changes, confusion, chest pain, shortness of breath. PAtient fell today because she was unable to "feel" her leg.  Ommaya attempted to be tapped today, however unable to withdraw significant cyst fluid    12/18/18 Ommaya placement  1/5/19 Gamma knife SRS  2/5/19 Ommaya revision (15 Feb 2019 23:58)    PROCEDURE: 02/18/2019  s/p Left craniotomy for tumor resection   POD#2    PLAN:  -Neuro: Repeat CT pending to follow up IPH. As per nuerosurgery team if stable plan is to start dvt ppx sql.   -MRI pending post op. Keppra for seizure ppx  -Pulmonary following for Pulmonary embolism. Recommend eventual full AC when cleared by neurosurgery. DVT (deep venous thrombosis).  Plan: DVT in the peroneal and soleal veins. Thrombus in the greater saphenous vein extending into the common femoral vein. S/p IVC filter placement 2/17.   -Oncology called and they will see patient today  -Encouraged incentive spirometry  -Continue synthroid for hypothyroidism  -Colace, senna for bowel regimen  -PT/OT: home PT; PMR called as pt might benefit from rehab    Will discuss above with Dr. Jeovany Arenas #[id number]             Respiratory:   CV:  Endocrine:   Heme/Onc:             DVT ppx:   Renal:   ID:   GI:   Social/Family:   Discharge planning:         Assessment:  Please Check When Present   []  GCS  E   V  M     Heart Failure: []Acute, [] acute on chronic , []chronic  Heart Failure:  [] Diastolic (HFpEF), [] Systolic (HFrEF), []Combined (HFpEF and HFrEF), [] RHF, [] Pulm HTN, [] Other    [] LESA, [] ATN, [] AIN, [] other  [] CKD1, [] CKD2, [] CKD 3, [] CKD 4, [] CKD 5, []ESRD    Encephalopathy: [] Metabolic, [] Hepatic, [] toxic, [] Neurological, [] Other    Abnormal Nurtitional Status: [] malnurtition (see nutrition note), [ ]underweight: BMI < 19, [] morbid obesity: BMI >40, [] Cachexia    [] Sepsis  [] hypovolemic shock,[] cardiogenic shock, [] hemorrhagic shock, [] neuogenic shock  [] Acute Respiratory Failure  []Cerebral edema, [] Brain compression/ herniation,   [] Functional quadriplegia  [] Acute blood loss anemia 83-year old woman w/ 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 Ommaya exchange 11 days due to catheter blockage. Here now for RUE and RLE numbness x1 days w/ mild headache. No fevers, nausea/vomiting, visual changes, confusion, chest pain, shortness of breath. PAtient fell today because she was unable to "feel" her leg.  Ommaya attempted to be tapped today, however unable to withdraw significant cyst fluid    12/18/18 Ommaya placement  1/5/19 Gamma knife SRS  2/5/19 Ommaya revision (15 Feb 2019 23:58)    PROCEDURE: 02/18/2019  s/p Left craniotomy for tumor resection   POD#2    PLAN:  -Neuro: Repeat CT pending to follow up IPH. As per nuerosurgery team if stable plan is to start dvt ppx sql.   -MRI pending post op. Keppra for seizure ppx  -Pulmonary following for Pulmonary embolism. Recommend eventual full AC when cleared by neurosurgery. DVT (deep venous thrombosis).  Plan: DVT in the peroneal and soleal veins. Thrombus in the greater saphenous vein extending into the common femoral vein. S/p IVC filter placement 2/17.   -Oncology called and they will see patient today  -Encouraged incentive spirometry  -Continue synthroid for hypothyroidism  -Colace, senna for bowel regimen  -PT/OT: home PT; PMR called as pt might benefit from rehab    Will discuss above with Dr. Jeovany Arenas #[id number]       Assessment:  Please Check When Present   []  GCS  E   V  M     Heart Failure: []Acute, [] acute on chronic , []chronic  Heart Failure:  [] Diastolic (HFpEF), [] Systolic (HFrEF), []Combined (HFpEF and HFrEF), [] RHF, [] Pulm HTN, [] Other    [] LESA, [] ATN, [] AIN, [] other  [] CKD1, [] CKD2, [] CKD 3, [] CKD 4, [] CKD 5, []ESRD    Encephalopathy: [] Metabolic, [] Hepatic, [] toxic, [] Neurological, [] Other    Abnormal Nurtitional Status: [] malnurtition (see nutrition note), [ ]underweight: BMI < 19, [] morbid obesity: BMI >40, [] Cachexia    [] Sepsis  [] hypovolemic shock,[] cardiogenic shock, [] hemorrhagic shock, [] neuogenic shock  [] Acute Respiratory Failure  []Cerebral edema, [] Brain compression/ herniation,   [] Functional quadriplegia  [] Acute blood loss anemia

## 2019-02-20 NOTE — PROGRESS NOTE ADULT - SUBJECTIVE AND OBJECTIVE BOX
Follow-up Pulm Progress Note    No new respiratory events overnight.    Sats 100% on 2LNC, Sats 95-96% on RA.  Denies SOB/CP.     Medications:  MEDICATIONS  (STANDING):  dexamethasone     Tablet 4 milliGRAM(s) Oral every 6 hours  dextrose 5%. 1000 milliLiter(s) (50 mL/Hr) IV Continuous <Continuous>  dextrose 50% Injectable 12.5 Gram(s) IV Push once  dextrose 50% Injectable 25 Gram(s) IV Push once  dextrose 50% Injectable 25 Gram(s) IV Push once  docusate sodium 100 milliGRAM(s) Oral three times a day  enoxaparin Injectable 40 milliGRAM(s) SubCutaneous at bedtime  insulin lispro (HumaLOG) corrective regimen sliding scale   SubCutaneous three times a day before meals  insulin lispro (HumaLOG) corrective regimen sliding scale   SubCutaneous at bedtime  levETIRAcetam 500 milliGRAM(s) Oral every 12 hours  levothyroxine 88 MICROGram(s) Oral daily    MEDICATIONS  (PRN):  acetaminophen   Tablet .. 650 milliGRAM(s) Oral every 6 hours PRN Temp greater or equal to 38C (100.4F), Mild Pain (1 - 3)  dextrose 40% Gel 15 Gram(s) Oral once PRN Blood Glucose LESS THAN 70 milliGRAM(s)/deciliter  glucagon  Injectable 1 milliGRAM(s) IntraMuscular once PRN Glucose LESS THAN 70 milligrams/deciliter  ondansetron Injectable 4 milliGRAM(s) IV Push every 6 hours PRN Nausea and/or Vomiting  senna 2 Tablet(s) Oral at bedtime PRN Constipation          Vital Signs Last 24 Hrs  T(C): 36.6 (20 Feb 2019 15:33), Max: 36.6 (19 Feb 2019 23:10)  T(F): 97.9 (20 Feb 2019 15:33), Max: 97.9 (19 Feb 2019 23:10)  HR: 80 (20 Feb 2019 15:33) (67 - 80)  BP: 124/64 (20 Feb 2019 15:33) (115/66 - 145/70)  BP(mean): --  RR: 18 (20 Feb 2019 15:33) (17 - 18)  SpO2: 96% (20 Feb 2019 15:33) (94% - 98%)          02-19 @ 07:01  -  02-20 @ 07:00  --------------------------------------------------------  IN: 440 mL / OUT: 200 mL / NET: 240 mL          LABS:                        9.3    9.4   )-----------( 214      ( 19 Feb 2019 11:18 )             29.0     02-19    140  |  109<H>  |  18  ----------------------------<  126<H>  4.9   |  21<L>  |  0.69    Ca    8.3<L>      19 Feb 2019 11:18            CAPILLARY BLOOD GLUCOSE      POCT Blood Glucose.: 153 mg/dL (20 Feb 2019 13:20)        Physical Examination:  PULM: Clear to auscultation bilaterally, no significant sputum production  CVS: RRR

## 2019-02-20 NOTE — PROGRESS NOTE ADULT - ASSESSMENT
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 with plans for craniectomy, presents with DVT on screening ultrasound, now s/p IR IVC filter placement    - Patient now s/p IR IVC filter placement  - leg elevation prn  knee high comp stockings 20-30mm Hg (rx given)  f/u as outpt for tristan le dvt surveillance  reconsult prn

## 2019-02-20 NOTE — PROGRESS NOTE ADULT - PROBLEM SELECTOR PLAN 1
Pulmonary emboli involving the right main pulmonary artery with extension   into the right lower lobe lobar, segmental and subsegmental coronary   arteries.   -Eventual full AC when cleared by neurosurgery.  -Normoxic on RA. Keep sats >90% with supplemental O2 as needed.

## 2019-02-20 NOTE — CONSULT NOTE ADULT - ATTENDING COMMENTS
Pt seen examined and d/w fellow. agree with above A/P. H/e RCC and recent met disease with path findings as noted above. Pt is alert and responsive. No complaints. PE as above. A/P Agree with above A/P. Agree with holding AC in light of the path findings and recent surgery. Will be considered for pazopanib as an outpt with F/U with Dr Navarrete at the [facility] post discharge.
I performed a history and physical exam of the patient and discussed  the findings and plan with the house officer. I reviewed the resident note and agree with the findings and plan

## 2019-02-21 ENCOUNTER — OUTPATIENT (OUTPATIENT)
Dept: OUTPATIENT SERVICES | Facility: HOSPITAL | Age: 84
LOS: 1 days | Discharge: HOME | End: 2019-02-21

## 2019-02-21 DIAGNOSIS — D61.01 CONSTITUTIONAL (PURE) RED BLOOD CELL APLASIA: ICD-10-CM

## 2019-02-21 DIAGNOSIS — Z90.5 ACQUIRED ABSENCE OF KIDNEY: Chronic | ICD-10-CM

## 2019-02-21 LAB
GLUCOSE BLDC GLUCOMTR-MCNC: 141 MG/DL — HIGH (ref 70–99)
GLUCOSE BLDC GLUCOMTR-MCNC: 142 MG/DL — HIGH (ref 70–99)
GLUCOSE BLDC GLUCOMTR-MCNC: 157 MG/DL — HIGH (ref 70–99)
GLUCOSE BLDC GLUCOMTR-MCNC: 235 MG/DL — HIGH (ref 70–99)

## 2019-02-21 PROCEDURE — 70553 MRI BRAIN STEM W/O & W/DYE: CPT | Mod: 26

## 2019-02-21 RX ORDER — DEXAMETHASONE 0.5 MG/5ML
4 ELIXIR ORAL
Qty: 0 | Refills: 0 | Status: DISCONTINUED | OUTPATIENT
Start: 2019-02-21 | End: 2019-02-22

## 2019-02-21 RX ORDER — LACTULOSE 10 G/15ML
20 SOLUTION ORAL EVERY 6 HOURS
Qty: 0 | Refills: 0 | Status: DISCONTINUED | OUTPATIENT
Start: 2019-02-21 | End: 2019-02-22

## 2019-02-21 RX ADMIN — Medication 88 MICROGRAM(S): at 05:35

## 2019-02-21 RX ADMIN — Medication 4 MILLIGRAM(S): at 11:32

## 2019-02-21 RX ADMIN — LEVETIRACETAM 500 MILLIGRAM(S): 250 TABLET, FILM COATED ORAL at 18:05

## 2019-02-21 RX ADMIN — LACTULOSE 20 GRAM(S): 10 SOLUTION ORAL at 12:30

## 2019-02-21 RX ADMIN — Medication 100 MILLIGRAM(S): at 05:35

## 2019-02-21 RX ADMIN — Medication 1 ENEMA: at 09:58

## 2019-02-21 RX ADMIN — LEVETIRACETAM 500 MILLIGRAM(S): 250 TABLET, FILM COATED ORAL at 05:35

## 2019-02-21 RX ADMIN — ENOXAPARIN SODIUM 40 MILLIGRAM(S): 100 INJECTION SUBCUTANEOUS at 22:56

## 2019-02-21 RX ADMIN — Medication 4 MILLIGRAM(S): at 05:35

## 2019-02-21 RX ADMIN — Medication 4 MILLIGRAM(S): at 18:05

## 2019-02-21 RX ADMIN — Medication 4 MILLIGRAM(S): at 22:57

## 2019-02-21 NOTE — PROGRESS NOTE ADULT - ASSESSMENT
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 Ommaya exchange 11 days due to catheter blockage. Here now for RUE and RLE numbness x1 days w/ mild headache. No fevers, nausea/vomiting, visual changes, confusion, chest pain, shortness of breath. PAtient fell today because she was unable to "feel" her leg.  Ommaya attempted to be tapped today, however unable to withdraw significant cyst fluid    12/18/18 Ommaya placement  1/5/19 Gamma knife SRS  2/5/19 Ommaya revision (15 Feb 2019 23:58)    PROCEDURE: 02/18/2019  s/p Left craniotomy for tumor resection   POD#3    PLAN:  -Neuro: MRI pending. Call and spoke to MRI and they said patient is on schedule for 4 pm today.   -Keppra for seizure ppx  -Pulmonary following for Pulmonary embolism. Recommend eventual full AC when cleared by neurosurgery. DVT (deep venous thrombosis).  Plan: DVT in the peroneal and soleal veins. Thrombus in the greater saphenous vein extending into the common femoral vein. S/p IVC filter placement 2/17.   -Oncology following 1) Metastatic Renal Cell Carcinoma- metastatic disease to lung, brain, pancreas  - pathology has returned from brain consistent with RCC as well   - will need to f/u with Dr. Navarrete as outpatient to start systemic treatment with TKI most likely pazopanib   - she will contact to make appointment after discharge   2) PE/DVT- s/p IVC filter  - renal cell malignancies have tendency to bleed especially with brain lesions  - therefore, agree with holding off on anticoagulation at the moment   - currently vitals stable    -Encouraged incentive spirometry  -Continue synthroid for hypothyroidism  -Colace, senna for bowel regimen  -PT/OT: home PT with RW when stable     Will discuss above with Dr. Jeovany Bellink #[id number]       Assessment:  Please Check When Present   []  GCS  E   V  M     Heart Failure: []Acute, [] acute on chronic , []chronic  Heart Failure:  [] Diastolic (HFpEF), [] Systolic (HFrEF), []Combined (HFpEF and HFrEF), [] RHF, [] Pulm HTN, [] Other    [] LESA, [] ATN, [] AIN, [] other  [] CKD1, [] CKD2, [] CKD 3, [] CKD 4, [] CKD 5, []ESRD    Encephalopathy: [] Metabolic, [] Hepatic, [] toxic, [] Neurological, [] Other    Abnormal Nurtitional Status: [] malnurtition (see nutrition note), [ ]underweight: BMI < 19, [] morbid obesity: BMI >40, [] Cachexia    [] Sepsis  [] hypovolemic shock,[] cardiogenic shock, [] hemorrhagic shock, [] neuogenic shock  [] Acute Respiratory Failure  []Cerebral edema, [] Brain compression/ herniation,   [] Functional quadriplegia  [] Acute blood loss anemia

## 2019-02-21 NOTE — PROGRESS NOTE ADULT - ASSESSMENT
84 yo female with PMH of RCC with cystic brain metastasis s/p Ommaya placement with weekly drainage. Ommaya exchange 11 days ago due to catheter blockage. Presents to ED with RUE and RLE numbness x1 day with mild headache. Ommaya attempted to be tapped on day of admission, however was unable to withdraw significant cyst fluid. CT chest multiple metastatic nodules bilaterally and right mainstem PE. IVC filter placed 2/17. S/p craniotomy 2/18. Sats 98% on 2LNC.

## 2019-02-21 NOTE — DIETITIAN INITIAL EVALUATION ADULT. - OTHER INFO
Patient seen for routine length of stay assessment.  Patient found sitting up in chair, pleasant.  Reports to be eating fine except for severe constipation, receiving bowel regimen.  PTA, lived alone and ate healthful balanced meals and followed WW plan.  Going home with daughter to heal and hopes to return to her own home.  Denies diabetes but aware of elevated A1c.  Discussed benefit of following a low sugar diet and consistent CHO, higher fiber diet.  A1c 6.2.

## 2019-02-21 NOTE — DIETITIAN INITIAL EVALUATION ADULT. - NS AS NUTRI INTERV ED CONTENT
Discussed consistent CHO diet and benefit of increasing Protein intake for healing, muscle and glucose control/Recommended modifications

## 2019-02-21 NOTE — PROGRESS NOTE ADULT - SUBJECTIVE AND OBJECTIVE BOX
Follow-up Pulm Progress Note    No new respiratory events overnight.   Sats 93% on RA after ambulation.  Denies SOB/CP.     Medications:  MEDICATIONS  (STANDING):  dexamethasone     Tablet 4 milliGRAM(s) Oral every 6 hours  dextrose 5%. 1000 milliLiter(s) (50 mL/Hr) IV Continuous <Continuous>  dextrose 50% Injectable 12.5 Gram(s) IV Push once  dextrose 50% Injectable 25 Gram(s) IV Push once  dextrose 50% Injectable 25 Gram(s) IV Push once  docusate sodium 100 milliGRAM(s) Oral three times a day  enoxaparin Injectable 40 milliGRAM(s) SubCutaneous at bedtime  insulin lispro (HumaLOG) corrective regimen sliding scale   SubCutaneous three times a day before meals  insulin lispro (HumaLOG) corrective regimen sliding scale   SubCutaneous at bedtime  levETIRAcetam 500 milliGRAM(s) Oral every 12 hours  levothyroxine 88 MICROGram(s) Oral daily    MEDICATIONS  (PRN):  acetaminophen   Tablet .. 650 milliGRAM(s) Oral every 6 hours PRN Temp greater or equal to 38C (100.4F), Mild Pain (1 - 3)  dextrose 40% Gel 15 Gram(s) Oral once PRN Blood Glucose LESS THAN 70 milliGRAM(s)/deciliter  glucagon  Injectable 1 milliGRAM(s) IntraMuscular once PRN Glucose LESS THAN 70 milligrams/deciliter  lactulose Syrup 20 Gram(s) Oral every 6 hours PRN constipation  ondansetron Injectable 4 milliGRAM(s) IV Push every 6 hours PRN Nausea and/or Vomiting  senna 2 Tablet(s) Oral at bedtime PRN Constipation          Vital Signs Last 24 Hrs  T(C): 36.4 (21 Feb 2019 13:12), Max: 36.5 (20 Feb 2019 23:03)  T(F): 97.5 (21 Feb 2019 13:12), Max: 97.7 (20 Feb 2019 23:03)  HR: 80 (21 Feb 2019 13:12) (73 - 82)  BP: 127/72 (21 Feb 2019 13:12) (112/55 - 150/75)  BP(mean): --  RR: 18 (21 Feb 2019 13:12) (18 - 20)  SpO2: 96% (21 Feb 2019 13:12) (93% - 97%)          02-20 @ 07:01  -  02-21 @ 07:00  --------------------------------------------------------  IN: 390 mL / OUT: 0 mL / NET: 390 mL          CAPILLARY BLOOD GLUCOSE      POCT Blood Glucose.: 235 mg/dL (21 Feb 2019 12:22)          Physical Examination:  PULM: Clear to auscultation bilaterally  CVS: RRR    RADIOLOGY REVIEWED  CT chest:   IMPRESSION:  Pulmonary emboli involving the right main pulmonary artery with extension   into the right lower lobe lobar, segmental and subsegmental coronary   arteries. No evidence of right heart strain.    Overall progression of metastatic disease in thelungs, left fifth rib,   and the upper abdomen.

## 2019-02-21 NOTE — PROGRESS NOTE ADULT - SUBJECTIVE AND OBJECTIVE BOX
SUBJECTIVE: Patient seen and examined at bedside. Complains of constipation.      OVERNIGHT EVENTS: none     Vital Signs Last 24 Hrs    T(C): 36.5 (21 Feb 2019 08:15), Max: 36.6 (20 Feb 2019 15:33)  T(F): 97.7 (21 Feb 2019 08:15), Max: 97.9 (20 Feb 2019 15:33)  HR: 74 (21 Feb 2019 08:15) (73 - 82)  BP: 146/66 (21 Feb 2019 08:15) (112/55 - 150/75)  BP(mean): --  RR: 18 (21 Feb 2019 08:15) (18 - 20)  SpO2: 93% (21 Feb 2019 08:15) (93% - 97%)    PHYSICAL EXAM:    General: No Acute Distress     Neurological: Awake, alert oriented to person, place and time, Following Commands, PERRL, EOMI, Face Symmetrical, Speech Fluent, Moving all extremities, Muscle Strength normal in all four extremities, No Drift, Sensation to Light Touch Intact    Pulmonary: Clear to Auscultation, No Rales, No Rhonchi, No Wheezes     Cardiovascular: S1, S2, Regular Rate and Rhythm     Gastrointestinal: Soft, Nontender, Nondistended     Incision: c/d/i     LABS:                        9.3    9.4   )-----------( 214      ( 19 Feb 2019 11:18 )             29.0    02-19    140  |  109<H>  |  18  ----------------------------<  126<H>  4.9   |  21<L>  |  0.69    Ca    8.3<L>      19 Feb 2019 11:18    TPro  6.5  /  Alb  3.3  /  TBili  0.1<L>  /  DBili  x   /  AST  10  /  ALT  12  /  AlkPhos  77  02-18      DIET: CCD diet     IMAGING: < from: CT Head No Cont (02.20.19 @ 16:04) >  Decreasing hemorrhage and left parietal postsurgical cavity. Similar   appearing  left frontoparietal vasogenic edema    < end of copied text >

## 2019-02-21 NOTE — DIETITIAN INITIAL EVALUATION ADULT. - ENERGY NEEDS
HT 59 inches,  pounds, BMI 29.9,  pounds  Dxd with Mets RCC  with cystic brain mets, RUE and RLE weakness, DVT, for craniectomy.   Skin intact.  Well nourished.

## 2019-02-21 NOTE — DIETITIAN INITIAL EVALUATION ADULT. - PERTINENT MEDS FT
MEDICATIONS  (STANDING):  dexamethasone     Tablet 4 milliGRAM(s) Oral every 6 hours  dextrose 5%. 1000 milliLiter(s) (50 mL/Hr) IV Continuous <Continuous>  dextrose 50% Injectable 12.5 Gram(s) IV Push once  dextrose 50% Injectable 25 Gram(s) IV Push once  dextrose 50% Injectable 25 Gram(s) IV Push once  docusate sodium 100 milliGRAM(s) Oral three times a day  enoxaparin Injectable 40 milliGRAM(s) SubCutaneous at bedtime  insulin lispro (HumaLOG) corrective regimen sliding scale   SubCutaneous three times a day before meals  insulin lispro (HumaLOG) corrective regimen sliding scale   SubCutaneous at bedtime  levETIRAcetam 500 milliGRAM(s) Oral every 12 hours  levothyroxine 88 MICROGram(s) Oral daily    MEDICATIONS  (PRN):  acetaminophen   Tablet .. 650 milliGRAM(s) Oral every 6 hours PRN Temp greater or equal to 38C (100.4F), Mild Pain (1 - 3)  dextrose 40% Gel 15 Gram(s) Oral once PRN Blood Glucose LESS THAN 70 milliGRAM(s)/deciliter  glucagon  Injectable 1 milliGRAM(s) IntraMuscular once PRN Glucose LESS THAN 70 milligrams/deciliter  lactulose Syrup 20 Gram(s) Oral every 6 hours PRN constipation  ondansetron Injectable 4 milliGRAM(s) IV Push every 6 hours PRN Nausea and/or Vomiting  senna 2 Tablet(s) Oral at bedtime PRN Constipation

## 2019-02-22 ENCOUNTER — TRANSCRIPTION ENCOUNTER (OUTPATIENT)
Age: 84
End: 2019-02-22

## 2019-02-22 VITALS
SYSTOLIC BLOOD PRESSURE: 117 MMHG | RESPIRATION RATE: 18 BRPM | DIASTOLIC BLOOD PRESSURE: 75 MMHG | OXYGEN SATURATION: 93 % | TEMPERATURE: 98 F | HEART RATE: 87 BPM

## 2019-02-22 LAB
GLUCOSE BLDC GLUCOMTR-MCNC: 127 MG/DL — HIGH (ref 70–99)
GLUCOSE BLDC GLUCOMTR-MCNC: 94 MG/DL — SIGNIFICANT CHANGE UP (ref 70–99)

## 2019-02-22 PROCEDURE — 82947 ASSAY GLUCOSE BLOOD QUANT: CPT

## 2019-02-22 PROCEDURE — C1769: CPT

## 2019-02-22 PROCEDURE — 99222 1ST HOSP IP/OBS MODERATE 55: CPT | Mod: GC

## 2019-02-22 PROCEDURE — 82803 BLOOD GASES ANY COMBINATION: CPT

## 2019-02-22 PROCEDURE — 85610 PROTHROMBIN TIME: CPT

## 2019-02-22 PROCEDURE — 37191 INS ENDOVAS VENA CAVA FILTR: CPT

## 2019-02-22 PROCEDURE — 99231 SBSQ HOSP IP/OBS SF/LOW 25: CPT

## 2019-02-22 PROCEDURE — 71275 CT ANGIOGRAPHY CHEST: CPT

## 2019-02-22 PROCEDURE — 83605 ASSAY OF LACTIC ACID: CPT

## 2019-02-22 PROCEDURE — 84132 ASSAY OF SERUM POTASSIUM: CPT

## 2019-02-22 PROCEDURE — P9016: CPT

## 2019-02-22 PROCEDURE — C1889: CPT

## 2019-02-22 PROCEDURE — 85014 HEMATOCRIT: CPT

## 2019-02-22 PROCEDURE — 82435 ASSAY OF BLOOD CHLORIDE: CPT

## 2019-02-22 PROCEDURE — A9585: CPT

## 2019-02-22 PROCEDURE — 83036 HEMOGLOBIN GLYCOSYLATED A1C: CPT

## 2019-02-22 PROCEDURE — C1887: CPT

## 2019-02-22 PROCEDURE — 75809 NONVASCULAR SHUNT X-RAY: CPT

## 2019-02-22 PROCEDURE — 80048 BASIC METABOLIC PNL TOTAL CA: CPT

## 2019-02-22 PROCEDURE — 80053 COMPREHEN METABOLIC PANEL: CPT

## 2019-02-22 PROCEDURE — 88300 SURGICAL PATH GROSS: CPT

## 2019-02-22 PROCEDURE — 82962 GLUCOSE BLOOD TEST: CPT

## 2019-02-22 PROCEDURE — 97110 THERAPEUTIC EXERCISES: CPT

## 2019-02-22 PROCEDURE — 70450 CT HEAD/BRAIN W/O DYE: CPT

## 2019-02-22 PROCEDURE — 97116 GAIT TRAINING THERAPY: CPT

## 2019-02-22 PROCEDURE — 70553 MRI BRAIN STEM W/O & W/DYE: CPT

## 2019-02-22 PROCEDURE — 97165 OT EVAL LOW COMPLEX 30 MIN: CPT

## 2019-02-22 PROCEDURE — 86850 RBC ANTIBODY SCREEN: CPT

## 2019-02-22 PROCEDURE — 84295 ASSAY OF SERUM SODIUM: CPT

## 2019-02-22 PROCEDURE — C1894: CPT

## 2019-02-22 PROCEDURE — 93970 EXTREMITY STUDY: CPT

## 2019-02-22 PROCEDURE — 97161 PT EVAL LOW COMPLEX 20 MIN: CPT

## 2019-02-22 PROCEDURE — 86900 BLOOD TYPING SEROLOGIC ABO: CPT

## 2019-02-22 PROCEDURE — 86901 BLOOD TYPING SEROLOGIC RH(D): CPT

## 2019-02-22 PROCEDURE — 85730 THROMBOPLASTIN TIME PARTIAL: CPT

## 2019-02-22 PROCEDURE — 88307 TISSUE EXAM BY PATHOLOGIST: CPT

## 2019-02-22 PROCEDURE — 93005 ELECTROCARDIOGRAM TRACING: CPT

## 2019-02-22 PROCEDURE — C1713: CPT

## 2019-02-22 PROCEDURE — 85027 COMPLETE CBC AUTOMATED: CPT

## 2019-02-22 PROCEDURE — C1880: CPT

## 2019-02-22 PROCEDURE — 86923 COMPATIBILITY TEST ELECTRIC: CPT

## 2019-02-22 PROCEDURE — 82330 ASSAY OF CALCIUM: CPT

## 2019-02-22 PROCEDURE — 36430 TRANSFUSION BLD/BLD COMPNT: CPT

## 2019-02-22 PROCEDURE — 99285 EMERGENCY DEPT VISIT HI MDM: CPT

## 2019-02-22 RX ORDER — ENOXAPARIN SODIUM 100 MG/ML
70 INJECTION SUBCUTANEOUS
Qty: 1 | Refills: 0 | OUTPATIENT
Start: 2019-02-22

## 2019-02-22 RX ORDER — ENOXAPARIN SODIUM 100 MG/ML
60 INJECTION SUBCUTANEOUS
Qty: 3600 | Refills: 0
Start: 2019-02-22 | End: 2019-03-23

## 2019-02-22 RX ORDER — DEXAMETHASONE 0.5 MG/5ML
4 ELIXIR ORAL
Qty: 60 | Refills: 0
Start: 2019-02-22

## 2019-02-22 RX ORDER — OXYCODONE HYDROCHLORIDE 5 MG/1
1 TABLET ORAL
Qty: 30 | Refills: 0
Start: 2019-02-22

## 2019-02-22 RX ORDER — DOCUSATE SODIUM 100 MG
1 CAPSULE ORAL
Qty: 0 | Refills: 0 | DISCHARGE
Start: 2019-02-22

## 2019-02-22 RX ORDER — LEVETIRACETAM 250 MG/1
1 TABLET, FILM COATED ORAL
Qty: 30 | Refills: 0
Start: 2019-02-22

## 2019-02-22 RX ORDER — ENOXAPARIN SODIUM 100 MG/ML
60 INJECTION SUBCUTANEOUS
Qty: 1 | Refills: 0 | OUTPATIENT
Start: 2019-02-22

## 2019-02-22 RX ORDER — SENNA PLUS 8.6 MG/1
2 TABLET ORAL
Qty: 0 | Refills: 0 | DISCHARGE
Start: 2019-02-22

## 2019-02-22 RX ADMIN — Medication 88 MICROGRAM(S): at 05:22

## 2019-02-22 RX ADMIN — Medication 100 MILLIGRAM(S): at 12:04

## 2019-02-22 RX ADMIN — LEVETIRACETAM 500 MILLIGRAM(S): 250 TABLET, FILM COATED ORAL at 17:13

## 2019-02-22 RX ADMIN — Medication 4 MILLIGRAM(S): at 17:13

## 2019-02-22 RX ADMIN — LEVETIRACETAM 500 MILLIGRAM(S): 250 TABLET, FILM COATED ORAL at 05:22

## 2019-02-22 RX ADMIN — Medication 4 MILLIGRAM(S): at 05:22

## 2019-02-22 NOTE — OCCUPATIONAL THERAPY INITIAL EVALUATION ADULT - ANTICIPATED DISCHARGE DISPOSITION, OT EVAL
Home with home OT for home safety evaluation, balance, strength and ADLs. Home with supervision/assist for all functional activities and ADLs.

## 2019-02-22 NOTE — DISCHARGE NOTE ADULT - PROVIDER TOKENS
PROVIDER:[TOKEN:[[id number]]],PROVIDER:[TOKEN:[[id number]]],PROVIDER:[TOKEN:[[id number]]],PROVIDER:[TOKEN:[[id number]]] PROVIDER:[TOKEN:[[id number]]],PROVIDER:[TOKEN:[[id number]]],PROVIDER:[TOKEN:[[id number]]],PROVIDER:[TOKEN:[[id number]]]

## 2019-02-22 NOTE — CONSULT NOTE ADULT - SUBJECTIVE AND OBJECTIVE BOX
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 Patient fell on the day of admission because she was unable to "feel" her leg.    Unable to withdraw fluid from Ommaya on admission. Patient s/p left craniotomy for tumor resection with Dr Thayer 2/18/19, EBL ~ 150mls. Surgical path consistent with RCC.   CT chest revealed multiple metastatic nodules bilaterally and right mainstem PE. Patient treated for DVTs and PE. Thrombus in the greater saphenous vein extending into the common femoral vein, s/p IVC filter placement 2/17.     REVIEW OF SYSTEMS  Constitutional - No fever, No weight loss, No fatigue  HEENT - No eye pain, No visual disturbances, No difficulty hearing, No tinnitus, No vertigo, No neck pain  Respiratory - No cough, No wheezing, No shortness of breath  Cardiovascular - No chest pain, No palpitations  Gastrointestinal - No abdominal pain, No nausea, No vomiting, No diarrhea, No constipation  Genitourinary - No dysuria, No frequency, No hematuria, No incontinence  Neurological - No headaches, No memory loss, No loss of strength, No numbness, No tremors  Skin - No itching, No rashes, No lesions   Endocrine - No temperature intolerance  Musculoskeletal - No joint pain, No joint swelling, No muscle pain  Psychiatric - No depression, No anxiety    PAST MEDICAL & SURGICAL HISTORY  Essential (primary) hypertension  Hypothyroidism, unspecified type  Renal cancer  History of nephrectomy    FAMILY HISTORY   No pertinent family history in first degree relatives    SOCIAL HISTORY  Smoking - Denied  EtOH - Denied   Drugs - Denied    FUNCTIONAL HISTORY  Pt. resides in  with approximately 7 JOSE ALFREDO with railing  Independent PTA    CURRENT FUNCTIONAL STATUS  PT: pt (I) with bed mob, (I) with sit<>Stand transfer with RW, amb 100ft (I) with RW, good step length, good floor clearance,     ALLERGIES NKA    MEDICATIONS  (STANDING):  dexamethasone     Tablet 4 milliGRAM(s) Oral two times a day  dextrose 5%. 1000 milliLiter(s) (50 mL/Hr) IV Continuous <Continuous>  dextrose 50% Injectable 12.5 Gram(s) IV Push once  dextrose 50% Injectable 25 Gram(s) IV Push once  dextrose 50% Injectable 25 Gram(s) IV Push once  docusate sodium 100 milliGRAM(s) Oral three times a day  enoxaparin Injectable 40 milliGRAM(s) SubCutaneous at bedtime  insulin lispro (HumaLOG) corrective regimen sliding scale   SubCutaneous three times a day before meals  insulin lispro (HumaLOG) corrective regimen sliding scale   SubCutaneous at bedtime  levETIRAcetam 500 milliGRAM(s) Oral every 12 hours  levothyroxine 88 MICROGram(s) Oral daily    MEDICATIONS  (PRN):  acetaminophen   Tablet .. 650 milliGRAM(s) Oral every 6 hours PRN Temp greater or equal to 38C (100.4F), Mild Pain (1 - 3)  dextrose 40% Gel 15 Gram(s) Oral once PRN Blood Glucose LESS THAN 70 milliGRAM(s)/deciliter  glucagon  Injectable 1 milliGRAM(s) IntraMuscular once PRN Glucose LESS THAN 70 milligrams/deciliter  lactulose Syrup 20 Gram(s) Oral every 6 hours PRN constipation  ondansetron Injectable 4 milliGRAM(s) IV Push every 6 hours PRN Nausea and/or Vomiting  senna 2 Tablet(s) Oral at bedtime PRN Constipation    Vital Signs Last 24 Hrs  T(C): 36.6 (22 Feb 2019 08:58), Max: 36.6 (22 Feb 2019 00:06)  T(F): 97.8 (22 Feb 2019 08:58), Max: 97.9 (22 Feb 2019 05:03)  HR: 74 (22 Feb 2019 08:58) (74 - 83)  BP: 123/62 (22 Feb 2019 08:58) (100/56 - 127/72)  BP(mean): --  RR: 18 (22 Feb 2019 08:58) (18 - 18)  SpO2: 94% (22 Feb 2019 08:58) (94% - 97%)    PHYSICAL EXAM  Constitutional - NAD, Comfortable  HEENT - NCAT, EOMI  Neck - Supple, No limited ROM  Chest - CTA bilaterally, No wheeze, No rhonchi, No crackles  Cardiovascular - RRR, S1S2, No murmurs  Abdomen - BS+, Soft, NTND  Extremities - No C/C/E, No calf tenderness   Neurologic Exam -                    Cognitive - Awake, Alert, AAO to self, place, date, year, situation     Communication - Fluent, No dysarthria     Cranial Nerves - CN 2-12 intact     Motor - No focal deficits                    LEFT    UE - ShAB 5/5, EF 5/5, EE 5/5, WE 5/5,  5/5                    RIGHT UE - ShAB 5/5, EF 5/5, EE 5/5, WE 5/5,  5/5                    LEFT    LE - HF 5/5, KE 5/5, DF 5/5, PF 5/5                    RIGHT LE - HF 5/5, KE 5/5, DF 5/5, PF 5/5        Sensory - Intact to LT     Reflexes - DTR Intact, No primitive reflexive     Coordination - FTN intact     OculoVestibular - No saccades, No nystagmus, VOR         Balance - WNL Static  Psychiatric - Mood stable, Affect WNL    ------------------------------------------------------------------------------------------------  ASSESSMENT/PLAN    82yo female with 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 Hospital course c/b DVT and PE s/p IVC filter.    Pain - Tylenol PRN  DVT PPX - SCDs, lovenox  Rehab -   Continue bedside PT and OT while in acute hospital.  Expect patient to achieve functional goals for DC HOME with HOME CARE  Will sign off, please reconsult if needed for rehab dispo recommendations. 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 Patient fell on the day of admission because she was unable to "feel" her leg.    Unable to withdraw fluid from Ommaya on admission. Patient s/p left craniotomy for tumor resection with Dr Thayer 2/18/19, EBL ~ 150mls. Surgical path consistent with RCC.   CT chest revealed multiple metastatic nodules bilaterally and right mainstem PE. Patient treated for DVTs and PE. Thrombus in the greater saphenous vein extending into the common femoral vein, s/p IVC filter placement 2/17.     REVIEW OF SYSTEMS  Constitutional - No fever, No weight loss, No fatigue  HEENT - No eye pain, No visual disturbances, No difficulty hearing, No tinnitus, No vertigo, No neck pain  Respiratory - No cough, No wheezing, No shortness of breath  Cardiovascular - No chest pain, No palpitations  Gastrointestinal - No abdominal pain, No nausea, No vomiting, No diarrhea, No constipation  Genitourinary - No dysuria, No frequency, No hematuria, No incontinence  Neurological - No headaches, No memory loss, No loss of strength, No numbness, No tremors  Skin - No itching, No rashes, No lesions   Endocrine - No temperature intolerance  Musculoskeletal - No joint pain, No joint swelling, No muscle pain  Psychiatric - No depression, No anxiety    PAST MEDICAL & SURGICAL HISTORY  Essential (primary) hypertension  Hypothyroidism, unspecified type  Renal cancer  History of nephrectomy    FAMILY HISTORY   No pertinent family history in first degree relatives    SOCIAL HISTORY  Smoking - Denied  EtOH - Denied   Drugs - Denied    FUNCTIONAL HISTORY  Pt. resides in  with approximately 7 JOSE ALFREDO with railing  Independent PTA    CURRENT FUNCTIONAL STATUS  PT: pt (I) with bed mob, (I) with sit<>Stand transfer with RW, amb 100ft (I) with RW, good step length, good floor clearance,     ALLERGIES NKA    MEDICATIONS  (STANDING):  dexamethasone     Tablet 4 milliGRAM(s) Oral two times a day  dextrose 5%. 1000 milliLiter(s) (50 mL/Hr) IV Continuous <Continuous>  dextrose 50% Injectable 12.5 Gram(s) IV Push once  dextrose 50% Injectable 25 Gram(s) IV Push once  dextrose 50% Injectable 25 Gram(s) IV Push once  docusate sodium 100 milliGRAM(s) Oral three times a day  enoxaparin Injectable 40 milliGRAM(s) SubCutaneous at bedtime  insulin lispro (HumaLOG) corrective regimen sliding scale   SubCutaneous three times a day before meals  insulin lispro (HumaLOG) corrective regimen sliding scale   SubCutaneous at bedtime  levETIRAcetam 500 milliGRAM(s) Oral every 12 hours  levothyroxine 88 MICROGram(s) Oral daily    MEDICATIONS  (PRN):  acetaminophen   Tablet .. 650 milliGRAM(s) Oral every 6 hours PRN Temp greater or equal to 38C (100.4F), Mild Pain (1 - 3)  dextrose 40% Gel 15 Gram(s) Oral once PRN Blood Glucose LESS THAN 70 milliGRAM(s)/deciliter  glucagon  Injectable 1 milliGRAM(s) IntraMuscular once PRN Glucose LESS THAN 70 milligrams/deciliter  lactulose Syrup 20 Gram(s) Oral every 6 hours PRN constipation  ondansetron Injectable 4 milliGRAM(s) IV Push every 6 hours PRN Nausea and/or Vomiting  senna 2 Tablet(s) Oral at bedtime PRN Constipation    Vital Signs Last 24 Hrs  T(C): 36.6 (22 Feb 2019 08:58), Max: 36.6 (22 Feb 2019 00:06)  T(F): 97.8 (22 Feb 2019 08:58), Max: 97.9 (22 Feb 2019 05:03)  HR: 74 (22 Feb 2019 08:58) (74 - 83)  BP: 123/62 (22 Feb 2019 08:58) (100/56 - 127/72)  BP(mean): --  RR: 18 (22 Feb 2019 08:58) (18 - 18)  SpO2: 94% (22 Feb 2019 08:58) (94% - 97%)    PHYSICAL EXAM  Constitutional - NAD, Comfortable  HEENT - NCAT, EOMI  Neck - Supple, No limited ROM  Chest - CTA bilaterally, No wheeze, No rhonchi, No crackles  Cardiovascular - RRR, S1S2, No murmurs  Abdomen - BS+, Soft, NTND  Extremities - No C/C/E, No calf tenderness   Neurologic Exam -                    Cognitive - Awake, Alert, AAO to self, place, date, year, situation     Communication - Fluent, No dysarthria     Cranial Nerves - CN 2-12 intact     Motor - No focal deficits                    LEFT    UE - ShAB 5/5, EF 5/5, EE 5/5, WE 5/5,  5/5                    RIGHT UE - ShAB 5/5, EF 5/5, EE 5/5, WE 5/5,  5/5                    LEFT    LE - HF 5/5, KE 5/5, DF 5/5, PF 5/5                    RIGHT LE - HF 5/5, KE 5/5, DF 5/5, PF 5/5        Sensory - Intact to LT     Reflexes - DTR Intact, No primitive reflexive     Coordination - FTN intact     OculoVestibular - No saccades, No nystagmus, VOR         Balance - WNL Static  Psychiatric - Mood stable, Affect WNL    ------------------------------------------------------------------------------------------------  ASSESSMENT/PLAN    82yo female with 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 Hospital course c/b DVT and PE s/p IVC filter.    Pain - Tylenol PRN  DVT PPX - SCDs, lovenox  Rehab -   Continue bedside PT and OT while in acute hospital. 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 Patient fell on the day of admission because she was unable to "feel" her leg.    Unable to withdraw fluid from Ommaya on admission. Patient s/p left craniotomy for tumor resection with Dr Thayer 2/18/19, EBL ~ 150mls. Surgical path consistent with RCC.   CT chest revealed multiple metastatic nodules bilaterally and right mainstem PE. Patient treated for DVTs and PE. Thrombus in the greater saphenous vein extending into the common femoral vein, s/p IVC filter placement 2/17.     REVIEW OF SYSTEMS  Constitutional - No fever, No weight loss, No fatigue  HEENT - No eye pain, No visual disturbances, No difficulty hearing, No tinnitus, No vertigo, No neck pain  Respiratory - No cough, No wheezing, No shortness of breath  Cardiovascular - No chest pain, No palpitations  Gastrointestinal - No abdominal pain, No nausea, No vomiting, No diarrhea, No constipation  Genitourinary - No dysuria, No frequency, No hematuria, No incontinence  Neurological - No headaches, No memory loss, (+) loss of strength, No numbness, No tremors  Skin - No itching, No rashes, No lesions   Endocrine - No temperature intolerance  Musculoskeletal - No joint pain, No joint swelling, No muscle pain  Psychiatric - No depression, No anxiety    PAST MEDICAL & SURGICAL HISTORY  Essential (primary) hypertension  Hypothyroidism, unspecified type  Renal cancer  History of nephrectomy    FAMILY HISTORY   No pertinent family history in first degree relatives    SOCIAL HISTORY  Smoking - Denied  EtOH - Denied   Drugs - Denied    FUNCTIONAL HISTORY  Pt. resides in  with approximately 7 JOSE ALFREDO with railing  Independent PTA  Planning to stay with daughter temporarily upon discharge, 2 JOSE ALFREDO    CURRENT FUNCTIONAL STATUS  PT: pt (I) with bed mob, (I) with sit<>Stand transfer with RW, amb 100ft (I) with RW, good step length, good floor clearance,     ALLERGIES NKA    MEDICATIONS  (STANDING):  dexamethasone     Tablet 4 milliGRAM(s) Oral two times a day  dextrose 5%. 1000 milliLiter(s) (50 mL/Hr) IV Continuous <Continuous>  dextrose 50% Injectable 12.5 Gram(s) IV Push once  dextrose 50% Injectable 25 Gram(s) IV Push once  dextrose 50% Injectable 25 Gram(s) IV Push once  docusate sodium 100 milliGRAM(s) Oral three times a day  enoxaparin Injectable 40 milliGRAM(s) SubCutaneous at bedtime  insulin lispro (HumaLOG) corrective regimen sliding scale   SubCutaneous three times a day before meals  insulin lispro (HumaLOG) corrective regimen sliding scale   SubCutaneous at bedtime  levETIRAcetam 500 milliGRAM(s) Oral every 12 hours  levothyroxine 88 MICROGram(s) Oral daily    MEDICATIONS  (PRN):  acetaminophen   Tablet .. 650 milliGRAM(s) Oral every 6 hours PRN Temp greater or equal to 38C (100.4F), Mild Pain (1 - 3)  dextrose 40% Gel 15 Gram(s) Oral once PRN Blood Glucose LESS THAN 70 milliGRAM(s)/deciliter  glucagon  Injectable 1 milliGRAM(s) IntraMuscular once PRN Glucose LESS THAN 70 milligrams/deciliter  lactulose Syrup 20 Gram(s) Oral every 6 hours PRN constipation  ondansetron Injectable 4 milliGRAM(s) IV Push every 6 hours PRN Nausea and/or Vomiting  senna 2 Tablet(s) Oral at bedtime PRN Constipation    Vital Signs Last 24 Hrs  T(C): 36.6 (22 Feb 2019 08:58), Max: 36.6 (22 Feb 2019 00:06)  T(F): 97.8 (22 Feb 2019 08:58), Max: 97.9 (22 Feb 2019 05:03)  HR: 74 (22 Feb 2019 08:58) (74 - 83)  BP: 123/62 (22 Feb 2019 08:58) (100/56 - 127/72)  BP(mean): --  RR: 18 (22 Feb 2019 08:58) (18 - 18)  SpO2: 94% (22 Feb 2019 08:58) (94% - 97%)    PHYSICAL EXAM  Constitutional - NAD, Comfortable  HEENT - NC, EOMI, left scalp staples c/d/i  Neck - Supple, No limited ROM  Chest - CTA bilaterally, No wheeze  Cardiovascular - RRR, S1S2  Abdomen - BS+, Soft, NTND  Extremities -  No calf tenderness   Neurologic Exam -                    Cognitive - Awake, Alert, oriented to self, place, date, year, situation     Communication - Fluent, No dysarthria     Cranial Nerves - CN 2-12 intact     Motor -                     LEFT    UE - ShAB 5/5, EF 5/5, EE 5/5, WE 5/5,  5/5                    RIGHT UE - ShAB 4/5, EF 5/5, EE 5/5, WE 5/5,  5/5                    LEFT    LE - HF 5/5, KE 5/5, DF 5/5, PF 5/5                    RIGHT LE - HF 5/5, KE 5/5, DF 5/5, PF 5/5        Sensory - Intact to LT  Coordination- FTN intact b/l. Ambulating with RW >50 feet, steady, good foot clearance b/l     Reflexes - DTR diminished, symmetrical b/l  Psychiatric - Mood stable, Affect WNL    ------------------------------------------------------------------------------------------------  ASSESSMENT/PLAN    84yo female with 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 Hospital course c/b DVT and PE s/p IVC filter.    Pain - Tylenol PRN  DVT PPX - SCDs, lovenox  Rehab -   Continue bedside PT and OT while in acute hospital.  Expect patient to achieve functional goals for DC HOME with HOME CARE  Will sign off, please reconsult if needed for rehab dispo recommendations. 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 Patient fell on the day of admission because she was unable to "feel" her leg.    Unable to withdraw fluid from Ommaya on admission. Patient s/p left craniotomy for tumor resection with Dr Thayer 2/18/19, EBL ~ 150mls. Surgical path consistent with RCC.   CT chest revealed multiple metastatic nodules bilaterally and right mainstem PE. Patient treated for DVTs and PE. Thrombus in the greater saphenous vein extending into the common femoral vein, s/p IVC filter placement 2/17.     REVIEW OF SYSTEMS  Constitutional - No fever, No weight loss, No fatigue  HEENT - No eye pain, No visual disturbances, No difficulty hearing, No tinnitus, No vertigo, No neck pain  Respiratory - No cough, No wheezing, No shortness of breath  Cardiovascular - No chest pain, No palpitations  Gastrointestinal - No abdominal pain, No nausea, No vomiting, No diarrhea, No constipation  Genitourinary - No dysuria, No frequency, No hematuria, No incontinence  Neurological - No headaches, No memory loss, (+) loss of strength, No numbness, No tremors  Skin - No itching, No rashes, No lesions   Endocrine - No temperature intolerance  Musculoskeletal - No joint pain, No joint swelling, No muscle pain  Psychiatric - No depression, No anxiety    PAST MEDICAL & SURGICAL HISTORY  Essential (primary) hypertension  Hypothyroidism, unspecified type  Renal cancer  History of nephrectomy    FAMILY HISTORY   No pertinent family history in first degree relatives    SOCIAL HISTORY  Smoking - Denied  EtOH - Denied   Drugs - Denied    FUNCTIONAL HISTORY  Pt. resides in  with approximately 7 JOSE ALFREDO with railing  Independent PTA  Planning to stay with daughter temporarily upon discharge, 2 JOSE ALFREDO    CURRENT FUNCTIONAL STATUS  PT: pt (I) with bed mob, (I) with sit<>Stand transfer with RW, amb 100ft (I) with RW, good step length, good floor clearance,     ALLERGIES NKA    MEDICATIONS  (STANDING):  dexamethasone     Tablet 4 milliGRAM(s) Oral two times a day  dextrose 5%. 1000 milliLiter(s) (50 mL/Hr) IV Continuous <Continuous>  dextrose 50% Injectable 12.5 Gram(s) IV Push once  dextrose 50% Injectable 25 Gram(s) IV Push once  dextrose 50% Injectable 25 Gram(s) IV Push once  docusate sodium 100 milliGRAM(s) Oral three times a day  enoxaparin Injectable 40 milliGRAM(s) SubCutaneous at bedtime  insulin lispro (HumaLOG) corrective regimen sliding scale   SubCutaneous three times a day before meals  insulin lispro (HumaLOG) corrective regimen sliding scale   SubCutaneous at bedtime  levETIRAcetam 500 milliGRAM(s) Oral every 12 hours  levothyroxine 88 MICROGram(s) Oral daily    MEDICATIONS  (PRN):  acetaminophen   Tablet .. 650 milliGRAM(s) Oral every 6 hours PRN Temp greater or equal to 38C (100.4F), Mild Pain (1 - 3)  dextrose 40% Gel 15 Gram(s) Oral once PRN Blood Glucose LESS THAN 70 milliGRAM(s)/deciliter  glucagon  Injectable 1 milliGRAM(s) IntraMuscular once PRN Glucose LESS THAN 70 milligrams/deciliter  lactulose Syrup 20 Gram(s) Oral every 6 hours PRN constipation  ondansetron Injectable 4 milliGRAM(s) IV Push every 6 hours PRN Nausea and/or Vomiting  senna 2 Tablet(s) Oral at bedtime PRN Constipation    Vital Signs Last 24 Hrs  T(C): 36.6 (22 Feb 2019 08:58), Max: 36.6 (22 Feb 2019 00:06)  T(F): 97.8 (22 Feb 2019 08:58), Max: 97.9 (22 Feb 2019 05:03)  HR: 74 (22 Feb 2019 08:58) (74 - 83)  BP: 123/62 (22 Feb 2019 08:58) (100/56 - 127/72)  BP(mean): --  RR: 18 (22 Feb 2019 08:58) (18 - 18)  SpO2: 94% (22 Feb 2019 08:58) (94% - 97%)    PHYSICAL EXAM  Constitutional - NAD, Comfortable  HEENT - NC, EOMI, left scalp staples c/d/i  Neck - Supple, No limited ROM  Chest - CTA bilaterally, No wheeze  Cardiovascular - RRR, S1S2  Abdomen - BS+, Soft, NTND  Extremities -  No calf tenderness   Neurologic Exam -                    Cognitive - Awake, Alert, oriented to self, place, date, year, situation     Communication - Fluent, No dysarthria     Cranial Nerves - facial symmetry, EOMI, tongue midline      Motor -                     LEFT    UE - ShAB 5/5, EF 5/5, EE 5/5, WE 5/5,  5/5                    RIGHT UE - ShAB 4/5, EF 5/5, EE 5/5, WE 5/5,  5/5                    LEFT    LE - HF 5/5, KE 5/5, DF 5/5, PF 5/5                    RIGHT LE - HF 5/5, KE 5/5, DF 5/5, PF 5/5        Sensory - Intact to LT  Coordination- FTN intact b/l. Ambulating with RW >50 feet, steady, good foot clearance b/l     Reflexes - DTR diminished, symmetrical b/l  Psychiatric - Mood stable, Affect WNL    ------------------------------------------------------------------------------------------------  ASSESSMENT/PLAN    82yo female with 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 Hospital course c/b DVT and PE s/p IVC filter.    Pain - Tylenol PRN  DVT PPX - SCDs, lovenox  Rehab -   Continue bedside PT and OT while in acute hospital.  Expect patient to achieve functional goals for DC HOME with HOME CARE  Will sign off, please reconsult if needed for rehab dispo recommendations.

## 2019-02-22 NOTE — OCCUPATIONAL THERAPY INITIAL EVALUATION ADULT - LEVEL OF INDEPENDENCE: DRESS LOWER BODY, OT EVAL
Include Pregnancy/Lactation Warning?: No Tetracycline Counseling: Patient counseled regarding possible photosensitivity and increased risk for sunburn.  Patient instructed to avoid sunlight, if possible.  When exposed to sunlight, patients should wear protective clothing, sunglasses, and sunscreen.  The patient was instructed to call the office immediately if the following severe adverse effects occur:  hearing changes, easy bruising/bleeding, severe headache, or vision changes.  The patient verbalized understanding of the proper use and possible adverse effects of tetracycline.  All of the patient's questions and concerns were addressed. Patient understands to avoid pregnancy while on therapy due to potential birth defects. High Dose Vitamin A Counseling: Side effects reviewed, pt to contact office should one occur. Topical Sulfur Applications Pregnancy And Lactation Text: This medication is Pregnancy Category C and has an unknown safety profile during pregnancy. It is unknown if this topical medication is excreted in breast milk. Tazorac Pregnancy And Lactation Text: This medication is not safe during pregnancy. It is unknown if this medication is excreted in breast milk. Minocycline Pregnancy And Lactation Text: This medication is Pregnancy Category D and not consider safe during pregnancy. It is also excreted in breast milk. Topical Clindamycin Counseling: Patient counseled that this medication may cause skin irritation or allergic reactions.  In the event of skin irritation, the patient was advised to reduce the amount of the drug applied or use it less frequently.   The patient verbalized understanding of the proper use and possible adverse effects of clindamycin.  All of the patient's questions and concerns were addressed. Minocycline Counseling: Patient advised regarding possible photosensitivity and discoloration of the teeth, skin, lips, tongue and gums.  Patient instructed to avoid sunlight, if possible.  When exposed to sunlight, patients should wear protective clothing, sunglasses, and sunscreen.  The patient was instructed to call the office immediately if the following severe adverse effects occur:  hearing changes, easy bruising/bleeding, severe headache, or vision changes.  The patient verbalized understanding of the proper use and possible adverse effects of minocycline.  All of the patient's questions and concerns were addressed. Spironolactone Counseling: Patient advised regarding risks of diarrhea, abdominal pain, hyperkalemia, birth defects (for female patients), liver toxicity and renal toxicity. The patient may need blood work to monitor liver and kidney function and potassium levels while on therapy. The patient verbalized understanding of the proper use and possible adverse effects of spironolactone.  All of the patient's questions and concerns were addressed. Topical Clindamycin Pregnancy And Lactation Text: This medication is Pregnancy Category B and is considered safe during pregnancy. It is unknown if it is excreted in breast milk. Erythromycin Counseling:  I discussed with the patient the risks of erythromycin including but not limited to GI upset, allergic reaction, drug rash, diarrhea, increase in liver enzymes, and yeast infections. Detail Level: Zone Dapsone Counseling: I discussed with the patient the risks of dapsone including but not limited to hemolytic anemia, agranulocytosis, rashes, methemoglobinemia, kidney failure, peripheral neuropathy, headaches, GI upset, and liver toxicity.  Patients who start dapsone require monitoring including baseline LFTs and weekly CBCs for the first month, then every month thereafter.  The patient verbalized understanding of the proper use and possible adverse effects of dapsone.  All of the patient's questions and concerns were addressed. Doxycycline Counseling:  Patient counseled regarding possible photosensitivity and increased risk for sunburn.  Patient instructed to avoid sunlight, if possible.  When exposed to sunlight, patients should wear protective clothing, sunglasses, and sunscreen.  The patient was instructed to call the office immediately if the following severe adverse effects occur:  hearing changes, easy bruising/bleeding, severe headache, or vision changes.  The patient verbalized understanding of the proper use and possible adverse effects of doxycycline.  All of the patient's questions and concerns were addressed. Isotretinoin Counseling: Patient should get monthly blood tests, not donate blood, not drive at night if vision affected, not share medication, and not undergo elective surgery for 6 months after tx completed. Side effects reviewed, pt to contact office should one occur. Dapsone Pregnancy And Lactation Text: This medication is Pregnancy Category C and is not considered safe during pregnancy or breast feeding. Benzoyl Peroxide Counseling: Patient counseled that medicine may cause skin irritation and bleach clothing.  In the event of skin irritation, the patient was advised to reduce the amount of the drug applied or use it less frequently.   The patient verbalized understanding of the proper use and possible adverse effects of benzoyl peroxide.  All of the patient's questions and concerns were addressed. Benzoyl Peroxide Pregnancy And Lactation Text: This medication is Pregnancy Category C. It is unknown if benzoyl peroxide is excreted in breast milk. Birth Control Pills Counseling: Birth Control Pill Counseling: I discussed with the patient the potential side effects of OCPs including but not limited to increased risk of stroke, heart attack, thrombophlebitis, deep venous thrombosis, hepatic adenomas, breast changes, GI upset, headaches, and depression.  The patient verbalized understanding of the proper use and possible adverse effects of OCPs. All of the patient's questions and concerns were addressed. Topical Sulfur Applications Counseling: Topical Sulfur Counseling: Patient counseled that this medication may cause skin irritation or allergic reactions.  In the event of skin irritation, the patient was advised to reduce the amount of the drug applied or use it less frequently.   The patient verbalized understanding of the proper use and possible adverse effects of topical sulfur application.  All of the patient's questions and concerns were addressed. Spironolactone Pregnancy And Lactation Text: This medication can cause feminization of the male fetus and should be avoided during pregnancy. The active metabolite is also found in breast milk. Bactrim Pregnancy And Lactation Text: This medication is Pregnancy Category D and is known to cause fetal risk.  It is also excreted in breast milk. Birth Control Pills Pregnancy And Lactation Text: This medication should be avoided if pregnant and for the first 30 days post-partum. Erythromycin Pregnancy And Lactation Text: This medication is Pregnancy Category B and is considered safe during pregnancy. It is also excreted in breast milk. High Dose Vitamin A Pregnancy And Lactation Text: High dose vitamin A therapy is contraindicated during pregnancy and breast feeding. supervision Topical Retinoid counseling:  Patient advised to apply a pea-sized amount only at bedtime and wait 30 minutes after washing their face before applying.  If too drying, patient may add a non-comedogenic moisturizer. The patient verbalized understanding of the proper use and possible adverse effects of retinoids.  All of the patient's questions and concerns were addressed. Tazorac Counseling:  Patient advised that medication is irritating and drying.  Patient may need to apply sparingly and wash off after an hour before eventually leaving it on overnight.  The patient verbalized understanding of the proper use and possible adverse effects of tazorac.  All of the patient's questions and concerns were addressed. Bactrim Counseling:  I discussed with the patient the risks of sulfa antibiotics including but not limited to GI upset, allergic reaction, drug rash, diarrhea, dizziness, photosensitivity, and yeast infections.  Rarely, more serious reactions can occur including but not limited to aplastic anemia, agranulocytosis, methemoglobinemia, blood dyscrasias, liver or kidney failure, lung infiltrates or desquamative/blistering drug rashes. Isotretinoin Pregnancy And Lactation Text: This medication is Pregnancy Category X and is considered extremely dangerous during pregnancy. It is unknown if it is excreted in breast milk. Topical Retinoid Pregnancy And Lactation Text: This medication is Pregnancy Category C. It is unknown if this medication is excreted in breast milk. Doxycycline Pregnancy And Lactation Text: This medication is Pregnancy Category D and not consider safe during pregnancy. It is also excreted in breast milk but is considered safe for shorter treatment courses. Azithromycin Pregnancy And Lactation Text: This medication is considered safe during pregnancy and is also secreted in breast milk. Azithromycin Counseling:  I discussed with the patient the risks of azithromycin including but not limited to GI upset, allergic reaction, drug rash, diarrhea, and yeast infections.

## 2019-02-22 NOTE — PROGRESS NOTE ADULT - PROVIDER SPECIALTY LIST ADULT
NSICU
NSICU
Neurosurgery
Pulmonology
Vascular Surgery
Pulmonology

## 2019-02-22 NOTE — DISCHARGE NOTE ADULT - PLAN OF CARE
Please follow up Neurosurgeon for early next week.  Please call office to make appointment. Please follow up with Primary Care Physician. Please call office to make appointment. Please follow up Neurosurgeon by early next week. Please call office to make appointment. Please follow up with Primary Care Physician. Please call office to make appointment. Please follow up with Dr. Mcgee regarding management of your DVT/PE. Please call his office to make appoitment. Please call Dr. Denny office regarding your IVC filter management. Please follow up with Dr. Mcgee regarding management of your DVT. Please call his office to make appoitment. Please call Dr. Denny office regarding your IVC filter management. Please follow up with Dr. Navarrete after discharge. Please call office to make appointment. Please make an appointment for follow up with your primary care physician after discharge.

## 2019-02-22 NOTE — PROGRESS NOTE ADULT - PROBLEM SELECTOR PROBLEM 3
Metastatic renal cell carcinoma

## 2019-02-22 NOTE — PROGRESS NOTE ADULT - REASON FOR ADMISSION
brain tumor

## 2019-02-22 NOTE — DISCHARGE NOTE ADULT - ADDITIONAL INSTRUCTIONS
Please start SQL (70bid) for DVT/PE on POD#7 (2/25/2019) as per Dr. Thayer. Please call his office for follow up appoinment early next week.  Please follow up with Dr. Mcgee regarding management of your DVT/PE. Please call his office to make appoitment. Please call Dr. Denny office regarding your IVC filter management.  Please follow up with Dr. Navarrete (oncologist) after discharge. Please call office to make appointment.  Staples will be removed at follow up appointment. Keep incision site clean and dry. OK to shower but do not scrub incision area and pat dry when done.   please keep incision clean and dry, do not submerge wound in water for prolonged periods of time, pat dry after showering, and do not use any creams or ointments to incision. Please start SQL (70bid) for DVT/PE on POD#7 (2/25/2019) as per Dr. Thayer. Please call his office for follow up appoinment early next week.  Please follow up with Dr. Mcgee regarding management of your DVT/PE. Please call his office to make appoitment. Please call Dr. Strong office regarding your IVC filter management.  Please follow up with Dr. Navarrete (oncologist) after discharge. Please call office to make appointment.  Staples will be removed at follow up appointment. Keep incision site clean and dry. OK to shower but do not scrub incision area and pat dry when done.   please keep incision clean and dry, do not submerge wound in water for prolonged periods of time, pat dry after showering, and do not use any creams or ointments to incision.

## 2019-02-22 NOTE — PHARMACOTHERAPY INTERVENTION NOTE - COMMENTS
Spoke with patient and daughter and son-in-law Pat regarding discharge medication use, indication, side effects, monitoring, compliance, timing. Overview to monitor for blood in stool and contact prescriber immediately if they noticed any. Also to not run out of medications and follow up for refills. Patient stated she does not want oxycodone.

## 2019-02-22 NOTE — PROGRESS NOTE ADULT - PROBLEM SELECTOR PLAN 3
With multiple pulmonary mets and brain mets.  -S/p craniotomy 2/18

## 2019-02-22 NOTE — DISCHARGE NOTE ADULT - CARE PLAN
Principal Discharge DX:	Metastatic renal cell carcinoma  Goal:	Please follow up Neurosurgeon for early next week.  Please call office to make appointment. Please follow up with Primary Care Physician. Please call office to make appointment.  Assessment and plan of treatment:	Please follow up Neurosurgeon by early next week. Please call office to make appointment. Please follow up with Primary Care Physician. Please call office to make appointment.  Secondary Diagnosis:	DVT (deep venous thrombosis)  Assessment and plan of treatment:	Please follow up with Dr. Mcgee regarding management of your DVT/PE. Please call his office to make appoitment. Please call Dr. Denny office regarding your IVC filter management.  Secondary Diagnosis:	Pulmonary embolism  Assessment and plan of treatment:	Please follow up with Dr. Mcgee regarding management of your DVT. Please call his office to make appoitment. Please call Dr. Denny office regarding your IVC filter management.  Secondary Diagnosis:	Renal cancer  Assessment and plan of treatment:	Please follow up with Dr. Navarrete after discharge. Please call office to make appointment.  Secondary Diagnosis:	Hypothyroidism, unspecified type  Assessment and plan of treatment:	Please make an appointment for follow up with your primary care physician after discharge.  Secondary Diagnosis:	Essential (primary) hypertension  Assessment and plan of treatment:	Please make an appointment for follow up with your primary care physician after discharge.

## 2019-02-22 NOTE — PROGRESS NOTE ADULT - PROBLEM SELECTOR PLAN 1
Pulmonary emboli involving the right main pulmonary artery with extension   into the right lower lobe lobar, segmental and subsegmental coronary   arteries.   -Normoxic on RA. Keep sats >90% with supplemental O2 as needed.  -D/w neuro sx PA, as per Dr. Thayer patient will be able to start full dose AC on Monday 2/25. Recommend full AC with Lovenox - dose 1mg/kg SC. Please provide education/tech back for injecting.   -Patient will likely require full AC indefinitely given hx of metastatic disease, but should follow up as an outpatient with heme/onc.   -F/u in office with Dr. Mcgee in 3 weeks. Pulmonary emboli involving the right main pulmonary artery with extension   into the right lower lobe lobar, segmental and subsegmental coronary   arteries.   -Normoxic on RA. Keep sats >90% with supplemental O2 as needed.  -D/w neuro sx PA, as per Dr. Thayer patient will be able to start full dose AC on Monday 2/25. Recommend full AC with Lovenox - dose 1mg/kg SC BID. Please provide education/tech back for injecting.   -Patient will likely require full AC indefinitely given hx of metastatic disease, but should follow up as an outpatient with heme/onc.   -F/u in office with Dr. Mcgee in 3 weeks.

## 2019-02-22 NOTE — CHART NOTE - NSCHARTNOTEFT_GEN_A_CORE
Patient weight was remeasured and her new weight is 62 kg. Spoke to pharmacist and pulmonary and they said to discharge patient on 60 mg bid of lovenox.

## 2019-02-22 NOTE — DISCHARGE NOTE ADULT - CARE PROVIDER_API CALL
Simona Navarrete; PhD)  Hematology; Internal Medicine; Medical Oncology  [address] [place] [address]  Phone: [phone]  Fax: [phone]  Follow Up Time:     Kevin Thayer)  Neurological Surgery  [address] [place] [address]  Phone: [phone]  Fax: [phone]  Follow Up Time:     Sylvester Mcgee (MD)  Critical Care Medicine  [address] [place] [address] [address]  [place] [address]  Phone: [phone]  Fax: [phone]  Follow Up Time:     Anshu Denny (DO)  Internal Medicine; Nuclear Cardiology  [address] [place] [address]  Phone: [phone]  Fax: [phone]  Follow Up Time: Simona Navarrete; PhD)  Hematology; Internal Medicine; Medical Oncology  [address] [place] [address]  Phone: [phone]  Fax: [phone]  Follow Up Time:     Kevin Thayer)  Neurological Surgery  [address] [place] [address]  Phone: [phone]  Fax: [phone]  Follow Up Time:     Sylvester Mcgee)  Critical Care Medicine  [address] [place] [address] [address]  [place] [address]  Phone: [phone]  Fax: [phone]  Follow Up Time:     Price Francis)  Vascular Surgery  [address] [facility], [address] [address]  Phone: [phone]  Fax: [phone]  Follow Up Time:

## 2019-02-22 NOTE — PROGRESS NOTE ADULT - SUBJECTIVE AND OBJECTIVE BOX
SUBJECTIVE: Patient seen and examined at bedside. Denies any complaints at this time.      OVERNIGHT EVENTS: none     Vital Signs Last 24 Hrs  T(C): 36.6 (22 Feb 2019 08:58), Max: 36.6 (22 Feb 2019 00:06)  T(F): 97.8 (22 Feb 2019 08:58), Max: 97.9 (22 Feb 2019 05:03)  HR: 74 (22 Feb 2019 08:58) (74 - 83)  BP: 123/62 (22 Feb 2019 08:58) (100/56 - 127/72)  BP(mean): --  RR: 18 (22 Feb 2019 08:58) (18 - 18)  SpO2: 94% (22 Feb 2019 08:58) (94% - 97%)    PHYSICAL EXAM:    General: No Acute Distress     Neurological: Awake, alert oriented to person, place and time, Following Commands, PERRL, EOMI, Face Symmetrical, Speech Fluent, Moving all extremities, Muscle Strength normal in all four extremities, No Drift, Sensation to Light Touch Intact    Pulmonary: Clear to Auscultation, No Rales, No Rhonchi, No Wheezes     Cardiovascular: S1, S2, Regular Rate and Rhythm     Gastrointestinal: Soft, Nontender, Nondistended     Incision: c/d/i     LABS: no new labs       DIET: CCD diet     IMAGING: < from: MR Head w/wo IV Cont (02.21.19 @ 20:18) >  Unchanged left parietal craniotomy and resection cavity in the left   frontal lobe with expected postoperative sequela with subjacent subdural   collection, resolving pneumocephalus and hemorrhage. There is persistent   irregular nodular enhancement along the left parietal lobe extending to   the left lateral ventricle ependymal margin, with surrounding edema and   mass effect with minimal 3 mm rightward shift, residual and/or recurrent   tumor not excluded.    < end of copied text >

## 2019-02-22 NOTE — PROGRESS NOTE ADULT - ASSESSMENT
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 Ommaya exchange 11 days due to catheter blockage. Here now for RUE and RLE numbness x1 days w/ mild headache. No fevers, nausea/vomiting, visual changes, confusion, chest pain, shortness of breath. PAtient fell today because she was unable to "feel" her leg.  Ommaya attempted to be tapped today, however unable to withdraw significant cyst fluid    12/18/18 Ommaya placement  1/5/19 Gamma knife SRS  2/5/19 Ommaya revision (15 Feb 2019 23:58)    PROCEDURE: 02/18/2019  s/p Left craniotomy for tumor resection   POD#4    PLAN:  -Neuro: MRI reviewed by neurosurgery team. As per Dr. Thurston ok to start patient on lovenox on POD#7 (2/25)  per Dr. Thayer. Nursing staff and pharmacist to provide education for injecting lovenox to patient and daughter prior to discharge. Patient notes she feels comfortable with that plan.   -Keppra for seizure ppx  -Pulmonary following for Pulmonary embolism. Recommend 1) full AC with Lovenox - dose 1mg/kg SC BID. Please provide education/tech back for injecting. 2) Patient will likely require full AC indefinitely given hx of metastatic disease, but should follow up as an outpatient with heme/onc.  3) F/u in office with Dr. Mcgee in 3 weeks.  -Oncology following 1) Metastatic Renal Cell Carcinoma- metastatic disease to lung, brain, pancreas  - pathology has returned from brain consistent with RCC as well   - will need to f/u with Dr. Navarrete as outpatient to start systemic treatment with TKI most likely pazopanib   - she will contact to make appointment after discharge   2) PE/DVT- s/p IVC filter  - renal cell malignancies have tendency to bleed especially with brain lesions  - therefore, agree with holding off on anticoagulation at the moment   - currently vitals stable    -Encouraged incentive spirometry  -Continue synthroid for hypothyroidism  -Colace, senna for bowel regimen  -PT/OT: home PT with RW     Will discuss above with Dr. Thayer  [facility] #[id number]       Assessment:  Please Check When Present   []  GCS  E   V  M     Heart Failure: []Acute, [] acute on chronic , []chronic  Heart Failure:  [] Diastolic (HFpEF), [] Systolic (HFrEF), []Combined (HFpEF and HFrEF), [] RHF, [] Pulm HTN, [] Other    [] LESA, [] ATN, [] AIN, [] other  [] CKD1, [] CKD2, [] CKD 3, [] CKD 4, [] CKD 5, []ESRD    Encephalopathy: [] Metabolic, [] Hepatic, [] toxic, [] Neurological, [] Other    Abnormal Nurtitional Status: [] malnurtition (see nutrition note), [ ]underweight: BMI < 19, [] morbid obesity: BMI >40, [] Cachexia    [] Sepsis  [] hypovolemic shock,[] cardiogenic shock, [] hemorrhagic shock, [] neuogenic shock  [] Acute Respiratory Failure  []Cerebral edema, [] Brain compression/ herniation,   [] Functional quadriplegia  [] Acute blood loss anemia

## 2019-02-22 NOTE — OCCUPATIONAL THERAPY INITIAL EVALUATION ADULT - PERTINENT HX OF CURRENT PROBLEM, REHAB EVAL
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 Ommaya exchange 11 days due to catheter blockage. Here now for RUE and RLE numbness x1 days w/ mild headache.PAtient fell today because she was unable to "feel" her leg.  Ommaya attempted to be tapped today, however unable to withdraw significant cyst fluid. 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 Ommaya exchange 11 days due to catheter blockage. Here now for RUE and RLE numbness x1 days w/ mild headache.PAtient fell today because she was unable to "feel" her leg.  Ommaya attempted to be tapped today, however unable to withdraw significant cyst fluid.

## 2019-02-22 NOTE — DISCHARGE NOTE ADULT - MEDICATION SUMMARY - MEDICATIONS TO TAKE
I will START or STAY ON the medications listed below when I get home from the hospital:    dexamethasone 1 mg oral tablet  -- 4 tab(s) PO bid  x 2 days  3  tab(s) PO bid x 2 days  2 tab(s) PO bid y x 2 days   2 tab(s) PO  daily   -- It is very important that you take or use this exactly as directed.  Do not skip doses or discontinue unless directed by your doctor.  Obtain medical advice before taking any non-prescription drugs as some may affect the action of this medication.  Take with food or milk.    -- Indication: For Edema    oxyCODONE 5 mg oral tablet  -- 1 tab(s) by mouth every 6 hours, As needed, Moderate to severe Pain (4 - 6) MDD:4  -- Indication: For Moderate pain    verapamil 120 mg/24 hours oral capsule, extended release  -- 1 cap(s) by mouth once a day  -- Indication: For antiarrhythmic     Lovenox 80 mg/0.8 mL injectable solution  -- 70 milligram(s) subcutaneously 2 times a day   Please start on 2/25 as per Dr. Thayer (POD#7)  -- It is very important that you take or use this exactly as directed.  Do not skip doses or discontinue unless directed by your doctor.    -- Indication: For DVT/PE    levETIRAcetam 500 mg oral tablet  -- 1 tab(s) by mouth 2 times a day MDD:2  -- Indication: For seizures    senna oral tablet  -- 2 tab(s) by mouth once a day (at bedtime), As needed, Constipation  -- Indication: For constipation    docusate sodium 100 mg oral capsule  -- 1 cap(s) by mouth 3 times a day  -- Indication: For constipation    pantoprazole 40 mg oral delayed release tablet  -- 1 tab(s) by mouth once a day   -- Indication: For gerd    Synthroid 88 mcg (0.088 mg) oral tablet  -- 1 tab(s) by mouth once a day  -- Indication: For Hypothyroidism, unspecified type I will START or STAY ON the medications listed below when I get home from the hospital:    dexamethasone 1 mg oral tablet  -- 4 tab(s) PO bid  x 2 days  3  tab(s) PO bid x 2 days  2 tab(s) PO bid y x 2 days   2 tab(s) PO  daily   -- It is very important that you take or use this exactly as directed.  Do not skip doses or discontinue unless directed by your doctor.  Obtain medical advice before taking any non-prescription drugs as some may affect the action of this medication.  Take with food or milk.    -- Indication: For Edema    oxyCODONE 5 mg oral tablet  -- 1 tab(s) by mouth every 6 hours, As needed, Moderate to severe Pain (4 - 6) MDD:4  -- Indication: For Moderate pain    verapamil 120 mg/24 hours oral capsule, extended release  -- 1 cap(s) by mouth once a day  -- Indication: For antiarrhythmic     Lovenox 80 mg/0.8 mL injectable solution  -- 60 milligram(s) subcutaneously 2 times a day  Please start on 2/25 as per Dr. Thayer (POD#7)   -- It is very important that you take or use this exactly as directed.  Do not skip doses or discontinue unless directed by your doctor.    -- Indication: For DVT/pe     levETIRAcetam 500 mg oral tablet  -- 1 tab(s) by mouth 2 times a day MDD:2  -- Indication: For seizures    senna oral tablet  -- 2 tab(s) by mouth once a day (at bedtime), As needed, Constipation  -- Indication: For constipation    docusate sodium 100 mg oral capsule  -- 1 cap(s) by mouth 3 times a day  -- Indication: For constipation    pantoprazole 40 mg oral delayed release tablet  -- 1 tab(s) by mouth once a day   -- Indication: For gerd    Synthroid 88 mcg (0.088 mg) oral tablet  -- 1 tab(s) by mouth once a day  -- Indication: For Hypothyroidism, unspecified type I will START or STAY ON the medications listed below when I get home from the hospital:    dexamethasone 1 mg oral tablet  -- 4 tab(s) PO bid  x 2 days  3  tab(s) PO bid x 2 days  2 tab(s) PO bid y x 2 days   2 tab(s) PO  daily   -- It is very important that you take or use this exactly as directed.  Do not skip doses or discontinue unless directed by your doctor.  Obtain medical advice before taking any non-prescription drugs as some may affect the action of this medication.  Take with food or milk.    -- Indication: For Edema    oxyCODONE 5 mg oral tablet  -- 1 tab(s) by mouth every 6 hours, As needed, Moderate to severe Pain (4 - 6) MDD:4  -- Indication: For Moderate pain    verapamil 120 mg/24 hours oral capsule, extended release  -- 1 cap(s) by mouth once a day  -- Indication: For antiarrhythmic     Lovenox 60 mg/0.6 mL injectable solution  -- 60 milligram(s) subcutaneously 2 times a day  Please start on 2/25 as per Dr. Thayer (POD#7)   -- It is very important that you take or use this exactly as directed.  Do not skip doses or discontinue unless directed by your doctor.    -- Indication: For DVT/pe     levETIRAcetam 500 mg oral tablet  -- 1 tab(s) by mouth 2 times a day MDD:2  -- Indication: For seizures    senna oral tablet  -- 2 tab(s) by mouth once a day (at bedtime), As needed, Constipation  -- Indication: For constipation    docusate sodium 100 mg oral capsule  -- 1 cap(s) by mouth 3 times a day  -- Indication: For constipation    pantoprazole 40 mg oral delayed release tablet  -- 1 tab(s) by mouth once a day   -- Indication: For gerd    Synthroid 88 mcg (0.088 mg) oral tablet  -- 1 tab(s) by mouth once a day  -- Indication: For Hypothyroidism, unspecified type

## 2019-02-22 NOTE — PROGRESS NOTE ADULT - SUBJECTIVE AND OBJECTIVE BOX
Follow-up Pulm Progress Note    No new respiratory events overnight.  Sats 95% on RA.  Denies SOB/CP.     Medications:  MEDICATIONS  (STANDING):  dexamethasone     Tablet 4 milliGRAM(s) Oral two times a day  dextrose 5%. 1000 milliLiter(s) (50 mL/Hr) IV Continuous <Continuous>  dextrose 50% Injectable 12.5 Gram(s) IV Push once  dextrose 50% Injectable 25 Gram(s) IV Push once  dextrose 50% Injectable 25 Gram(s) IV Push once  docusate sodium 100 milliGRAM(s) Oral three times a day  enoxaparin Injectable 40 milliGRAM(s) SubCutaneous at bedtime  insulin lispro (HumaLOG) corrective regimen sliding scale   SubCutaneous three times a day before meals  insulin lispro (HumaLOG) corrective regimen sliding scale   SubCutaneous at bedtime  levETIRAcetam 500 milliGRAM(s) Oral every 12 hours  levothyroxine 88 MICROGram(s) Oral daily    MEDICATIONS  (PRN):  acetaminophen   Tablet .. 650 milliGRAM(s) Oral every 6 hours PRN Temp greater or equal to 38C (100.4F), Mild Pain (1 - 3)  dextrose 40% Gel 15 Gram(s) Oral once PRN Blood Glucose LESS THAN 70 milliGRAM(s)/deciliter  glucagon  Injectable 1 milliGRAM(s) IntraMuscular once PRN Glucose LESS THAN 70 milligrams/deciliter  lactulose Syrup 20 Gram(s) Oral every 6 hours PRN constipation  ondansetron Injectable 4 milliGRAM(s) IV Push every 6 hours PRN Nausea and/or Vomiting  senna 2 Tablet(s) Oral at bedtime PRN Constipation          Vital Signs Last 24 Hrs  T(C): 36.6 (22 Feb 2019 08:58), Max: 36.6 (22 Feb 2019 00:06)  T(F): 97.8 (22 Feb 2019 08:58), Max: 97.9 (22 Feb 2019 05:03)  HR: 74 (22 Feb 2019 08:58) (74 - 83)  BP: 123/62 (22 Feb 2019 08:58) (100/56 - 127/72)  BP(mean): --  RR: 18 (22 Feb 2019 08:58) (18 - 18)  SpO2: 94% (22 Feb 2019 08:58) (94% - 97%)          02-21 @ 07:01  -  02-22 @ 07:00  --------------------------------------------------------  IN: 480 mL / OUT: 0 mL / NET: 480 mL              CAPILLARY BLOOD GLUCOSE      POCT Blood Glucose.: 127 mg/dL (22 Feb 2019 09:12)        Physical Examination:  PULM: Clear to auscultation bilaterally  CVS: RRR    RADIOLOGY REVIEWED  CT chest:  IMPRESSION:  Pulmonary emboli involving the right main pulmonary artery with extension   into the right lower lobe lobar, segmental and subsegmental coronary   arteries. No evidence of right heart strain.    Overall progression of metastatic disease in thelungs, left fifth rib,   and the upper abdomen.

## 2019-02-22 NOTE — DISCHARGE NOTE ADULT - MEDICATION SUMMARY - MEDICATIONS TO STOP TAKING
I will STOP taking the medications listed below when I get home from the hospital:    Keflex 500 mg oral capsule  -- 1 cap(s) by mouth 4 times a day (before meals and at bedtime)   -- Finish all this medication unless otherwise directed by prescriber.

## 2019-02-22 NOTE — OCCUPATIONAL THERAPY INITIAL EVALUATION ADULT - ADDITIONAL COMMENTS
MRH: Unchanged left parietal craniotomy and resection cavity in the left frontal lobe with expected postoperative sequela with subjacent subdural collection, resolving pneumocephalus and hemorrhage. There is persistent irregular nodular enhancement along the left parietal lobe extending to the left lateral ventricle ependymal margin, with surrounding edema and mass effect with minimal 3 mm rightward shift, residual and/or recurrent tumor not excluded. MR C Spine: No lytic or blastic lesions. No abnormal enhancement. No mass or cord compression to suggest metastases. Doppler BLE: RIGHT: DVT in the peroneal and soleal veins.LEFT: Thrombus in the greater saphenous vein extending into the common femoral vein. Please note upon rescanning, echogenic thrombus is no longer visualized and is concerning for superior migration of clot. Xray: Unchanged left parietal hilda hole with an intracranial shunt catheter. s/p Brain surgery  02/18/2019  Left craniotomy for tumor resection MRH: Unchanged left parietal craniotomy and resection cavity in the left frontal lobe with expected postoperative sequela with subjacent subdural collection, resolving pneumocephalus and hemorrhage. There is persistent irregular nodular enhancement along the left parietal lobe extending to the left lateral ventricle ependymal margin, with surrounding edema and mass effect with minimal 3 mm rightward shift, residual and/or recurrent tumor not excluded. MR C Spine: No lytic or blastic lesions. No abnormal enhancement. No mass or cord compression to suggest metastases. Doppler BLE: RIGHT: DVT in the peroneal and soleal veins.LEFT: Thrombus in the greater saphenous vein extending into the common femoral vein. Please note upon rescanning, echogenic thrombus is no longer visualized and is concerning for superior migration of clot. Xray: Unchanged left parietal hilda hole with an intracranial shunt catheter. s/p Brain surgery  02/18/2019  Left craniotomy for tumor resection s/p IVC filter 2/17

## 2019-02-22 NOTE — CHART NOTE - NSCHARTNOTEFT_GEN_A_CORE
VASCULAR SURGERY CHART NOTE    Patient examined at bedside, no complaints at this time, denies SOB, chest pain, leg pain/motor/sensory deficits, +mild LE swelling on exam.    Primary team inquiring about outpatient anticoagulation. Recommend Lovenox 1mg/kg subcutaneous BID if visiting nurse services can be arranged to help administer correct dose. Otherwise discharge with Lovenox 50 mg BID. Please have patient follow with Dr. Francis in outpatient clinic in 2 weeks.     ~Discussed with vascular surgery attending (Dr. Francis)    Jose Tomlinson MD PGY1  Vascular Surgery VASCULAR SURGERY CHART NOTE    Patient examined at bedside, no complaints at this time, denies SOB, chest pain, leg pain/motor/sensory deficits, +mild LE swelling on exam.    Primary team inquiring about outpatient anticoagulation. Recommend Lovenox 1mg/kg subcutaneous BID if visiting nurse services can be arranged to help administer correct dose. Otherwise discharge with Lovenox 50 mg BID. Please have patient follow with Dr. Francis in outpatient clinic in 2 weeks.     ~Discussed with vascular surgery attending (Dr. Francis)    Jose Tomlinson MD PGY1  Vascular Surgery    I agree with  the  evaluation, assessment and plan of the surgical house officer

## 2019-02-22 NOTE — DISCHARGE NOTE ADULT - SECONDARY DIAGNOSIS.
DVT (deep venous thrombosis) Pulmonary embolism Renal cancer Hypothyroidism, unspecified type Essential (primary) hypertension

## 2019-02-22 NOTE — DISCHARGE NOTE ADULT - CARE PROVIDERS DIRECT ADDRESSES
,[email or web address]

## 2019-02-22 NOTE — DISCHARGE NOTE ADULT - PATIENT PORTAL LINK FT
You can access the [facility] Patient Portal, offered by [facility], by registering with the following website: [email or web address]

## 2019-02-22 NOTE — DISCHARGE NOTE ADULT - NS AS ACTIVITY OBS
Do not make important decisions/Bathing allowed/Walking-Indoors allowed/Walking-Outdoors allowed/Do not drive or operate machinery/No Heavy lifting/straining

## 2019-02-22 NOTE — PROGRESS NOTE ADULT - PROBLEM SELECTOR PROBLEM 1
Leg DVT (deep venous thromboembolism), acute, bilateral
Leg DVT (deep venous thromboembolism), acute, bilateral
Pulmonary embolism
Weakness of lower extremity, unspecified laterality
Leg DVT (deep venous thromboembolism), acute, bilateral
Pulmonary embolism

## 2019-02-22 NOTE — DISCHARGE NOTE ADULT - HOSPITAL COURSE
84 yo female with PMH of RCC with cystic brain metastasis s/p Ommaya placement with weekly drainage. Ommaya exchange 11 days ago due to catheter blockage. Presents to ED with RUE and RLE numbness x1 day with mild headache. Ommaya attempted to be tapped on day of admission, however was unable to withdraw significant cyst fluid. CT chest multiple metastatic nodules bilaterally and right mainstem PE. IVC filter placed 2/17. He is s/p Left craniotomy for tumor resection on 2/18.  Patient seen by pulmonary and vascular for PE/DVT and they recommended full dose AC once deemed safe from neurosurgery. As per Dr. Jeovany stoner for patient to start on full dose AC on POD #7 (2/25/19). As per pharmacist/pulmonary to start patient on 70 mg sql bid. PT /OT saw the patient and they recommend home PT. At the time of discharge patient is neurologically and hemodynamically stable and clear for discharge home. Patient taught how to inject the lovenox by the nursing staff as well as the pharmacist and the daughter will also be shown by nursing staff before discharge.

## 2019-02-22 NOTE — PROGRESS NOTE ADULT - PROBLEM SELECTOR PLAN 2
DVT in the peroneal and soleal veins. Thrombus in the greater saphenous vein extending into the common femoral vein.  -S/p IVC filter placement 2/17  -Recommend f/u with vascular sx as outpatient for eventual filter removal
DVT in the peroneal and soleal veins. Thrombus in the greater saphenous vein extending into the common femoral vein.  -S/p IVC filter placement 2/17
DVT in the peroneal and soleal veins. Thrombus in the greater saphenous vein extending into the common femoral vein.  -S/p IVC filter placement 2/17.
DVT in the peroneal and soleal veins. Thrombus in the greater saphenous vein extending into the common femoral vein.  -S/p IVC filter placement 2/17.

## 2019-02-22 NOTE — DISCHARGE NOTE ADULT - INSTRUCTIONS
CCD diet.  NO heavy lifting, strenous activity, twisting, bending, driving, or working until cleared by your physician.  Please return to the emergency department if you develop changes in mental status, seizures, fainting, dizziness, changes in vision, lethargy, nausea, vomiting, chest pain, shortness of breathe or severe pain.

## 2019-02-25 ENCOUNTER — INBOUND DOCUMENT (OUTPATIENT)
Age: 84
End: 2019-02-25

## 2019-02-26 ENCOUNTER — APPOINTMENT (OUTPATIENT)
Dept: NEUROSURGERY | Facility: CLINIC | Age: 84
End: 2019-02-26
Payer: MEDICARE

## 2019-02-26 VITALS
DIASTOLIC BLOOD PRESSURE: 62 MMHG | TEMPERATURE: 98 F | SYSTOLIC BLOOD PRESSURE: 117 MMHG | HEART RATE: 75 BPM | RESPIRATION RATE: 18 BRPM | OXYGEN SATURATION: 98 %

## 2019-02-26 DIAGNOSIS — D49.6 NEOPLASM OF UNSPECIFIED BEHAVIOR OF BRAIN: ICD-10-CM

## 2019-02-26 DIAGNOSIS — C64.9 MALIGNANT NEOPLASM OF UNSPECIFIED KIDNEY, EXCEPT RENAL PELVIS: ICD-10-CM

## 2019-02-26 DIAGNOSIS — I10 ESSENTIAL (PRIMARY) HYPERTENSION: ICD-10-CM

## 2019-02-26 PROCEDURE — 99024 POSTOP FOLLOW-UP VISIT: CPT

## 2019-02-26 NOTE — ASSESSMENT
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 She is doing well and her right LE  weakness has been improving. \par - continue home PT\par - decadron for one week then discontinue\par - taper Keppra to 500mg daily for 4 weeks then discontinue if no seizure activity\par - refer to driving evaluation program for clearance\par - follow up with Dr. Navarrete (oncology) and Dr. Mcgee(vascular) as scheduled\par - repeat MRI brain w/wo in 6 weeks\par - RTC after MRI
English

## 2019-02-26 NOTE — PHYSICAL EXAM
[General Appearance - Alert] : alert [General Appearance - In No Acute Distress] : in no acute distress [General Appearance - Well Nourished] : well nourished [Irregular] : irregular [Clean] : clean [Dry] : dry [Healing Well] : healing well [No Drainage] : without drainage [Normal Skin] : normal [Oriented To Time, Place, And Person] : oriented to person, place, and time [Impaired Insight] : insight and judgment were intact [Affect] : the affect was normal [Memory Recent] : recent memory was not impaired [Person] : oriented to person [Place] : oriented to place [Time] : oriented to time [Short Term Intact] : short term memory intact [Remote Intact] : remote memory intact [Registration Intact] : recent registration memory intact [Span Intact] : the attention span was normal [Concentration Intact] : normal concentrating ability [Fluency] : fluency intact [Comprehension] : comprehension intact [Current Events] : adequate knowledge of current events [Past History] : adequate knowledge of personal past history [Vocabulary] : adequate range of vocabulary [I: Normal Smell] : smell intact bilaterally [Cranial Nerves Optic (II)] : visual acuity intact bilaterally,  pupils equal round and reactive to light [Cranial Nerves Oculomotor (III)] : extraocular motion intact [Cranial Nerves Trigeminal (V)] : facial sensation intact symmetrically [Cranial Nerves Facial (VII)] : face symmetrical [Cranial Nerves Vestibulocochlear (VIII)] : hearing was intact bilaterally [Cranial Nerves Glossopharyngeal (IX)] : tongue and palate midline [Cranial Nerves Accessory (XI - Cranial And Spinal)] : head turning and shoulder shrug symmetric [Motor Tone] : muscle tone was normal in all four extremities [4] : S1 flexor hallucis longus 4/5 [5] : S1 flexor hallucis longus 5/5 [Romberg's Sign] : a positive Romberg's sign was present [Abnormal Walk] : normal gait [Balance] : balance was intact [1+] : Ankle jerk left 1+ [No Visual Abnormalities] : no visible abnormailities [Normal] : normal [Sclera] : the sclera and conjunctiva were normal [PERRL With Normal Accommodation] : pupils were equal in size, round, reactive to light, with normal accommodation [Extraocular Movements] : extraocular movements were intact [Outer Ear] : the ears and nose were normal in appearance [Hearing Threshold Finger Rub Not [place]] : hearing was normal [Examination Of The Oral Cavity] : the lips and gums were normal [] : no respiratory distress [Exaggerated Use Of Accessory Muscles For Inspiration] : no accessory muscle use [Auscultation Breath Sounds / Voice Sounds] : lungs were clear to auscultation bilaterally [Edema] : there was no peripheral edema [No CVA Tenderness] : no ~M costovertebral angle tenderness [No Spinal Tenderness] : no spinal tenderness [Erythema] : not erythematous [Tender] : not tender [Warm] : not warm [Indurated] : not indurated [Fluctuant] : not fluctuant [Over the Past 2 Weeks, Have You Felt Down, Depressed, or Hopeless?] : 1.) Over the past 2 weeks, have you felt down, depressed, or hopeless? No [Over the Past 2 Weeks, Have You Felt Little Interest or Pleasure Doing Things?] : 2.) Over the past 2 weeks, have you felt little interest or pleasure doing things? No [FreeTextEntry1] : denies suicidal ideation [Able to toe walk] : the patient was not able to toe walk [Able to heel walk] : the patient was not able to heel walk

## 2019-02-26 NOTE — REASON FOR VISIT
[Family Member] : family member [de-identified] : left parietal craniotomy for resection of brain metastasis [de-identified] : 2/18/2019 [de-identified] : 8

## 2019-03-04 ENCOUNTER — APPOINTMENT (OUTPATIENT)
Dept: HEMATOLOGY ONCOLOGY | Facility: CLINIC | Age: 84
End: 2019-03-04
Payer: MEDICARE

## 2019-03-04 VITALS
RESPIRATION RATE: 16 BRPM | HEART RATE: 84 BPM | WEIGHT: 138.89 LBS | SYSTOLIC BLOOD PRESSURE: 116 MMHG | DIASTOLIC BLOOD PRESSURE: 63 MMHG | TEMPERATURE: 98.1 F | OXYGEN SATURATION: 96 % | BODY MASS INDEX: 29.03 KG/M2

## 2019-03-04 DIAGNOSIS — Z79.899 OTHER LONG TERM (CURRENT) DRUG THERAPY: ICD-10-CM

## 2019-03-04 PROCEDURE — 99214 OFFICE O/P EST MOD 30 MIN: CPT

## 2019-03-04 NOTE — PHYSICAL EXAM
[Normal] : affect appropriate [Ambulatory and capable of all self care but unable to carry out any work activities] : Status 2- Ambulatory and capable of all self care but unable to carry out any work activities. Up and about more than 50% of waking hours

## 2019-03-10 NOTE — REASON FOR VISIT
[Family Member] : family member [Follow-Up Visit] : a follow-up [FreeTextEntry2] : renal clear cell carcinoma

## 2019-03-10 NOTE — ASSESSMENT
[Palliative] : Goals of care discussed with patient: Palliative [FreeTextEntry1] : Yael Willard is an 83 years old female who initially presented left side mid-back pain in 2003. CT showed a large left renal mass s/p radical nephrectomy. Pathology showed RCC, Dayna grade 3.  In 2005 she was found to have small lung nodules s/p biopsy---it showed consistent with renal cell carcinoma. A left parietal cystic lesion was drained by Dr. Thayer. Pathology was negative. CT c/a/p in December 14, 2018 revealed Chest: bilateral pulmonary metastasis, left fifth rib metastasis. Abdomen and pelvis: The mass within the left nephrectomy bed which extends and involves the pancreas is compatible with recurrent renal cell cancer. Right adrenal metastasis. The peritoneal nodules are concerning for metastasis. Gallbladder nodule is suspicious for metastasis. US guided biopsy for her right lower quadrant lesion consistent with metastatic clear cell renal cell carcinoma. \par \par Based on International mRCC Consortium Data Consortium (IMDC) she is in good risk group. The standard of care is votrient for PFS benefit based on randomized phase III trial result. I had a discussion regarding potential AEs including but not limited to liver damage, hypertension, fatigue, QT prolongation, hand foot syndrome, skin rashes, nausea, vomiting, diarrhrea. However, she said chronic intermittent GI bleeding history which may prevent her getting votrient. Alternatively she will get immunotherapy. She is still really weak and needs to take a month to recover. I

## 2019-03-10 NOTE — REVIEW OF SYSTEMS
[Recent Change In Weight] : ~T recent weight change [Negative] : Allergic/Immunologic [Fatigue] : fatigue [Fever] : no fever [Chills] : no chills [Night Sweats] : no night sweats [Abdominal Pain] : no abdominal pain [Vomiting] : no vomiting [Constipation] : no constipation [Diarrhea] : no diarrhea [FreeTextEntry2] : 8lbs weight loss in one month

## 2019-03-10 NOTE — HISTORY OF PRESENT ILLNESS
[Disease: _____________________] : Disease: [unfilled] [T: ___] : T[unfilled] [N: ___] : N[unfilled] [M: ___] : M[unfilled] [AJCC Stage: ____] : AJCC Stage: [unfilled] [de-identified] : Yael Willard is an 83 years old female who initially presented left side mid-back pain in 2003. CT showed a large left renal mass s/p radical nephrectomy. Pathology showed RCC, Dayna grade 3. In 2005 she was found to have small lung nodules s/p biopsy---it showed consistent with renal cell carcinoma. She has lost her follow up until recently she presented with right side lower extremity and upper extremity weakness, lower extremity worse than upper extremity, associated with unsteady gait and dizziness around one month, denies nausea/vomiting/headache/blurry vision. CT c/a/p in December 14, 2018 revealed Chest: bilateral pulmonary metastasis, left fifth rib metastasis. Abdomen and pelvis: The mass within the left nephrectomy bed which extends and involves the pancreas is compatible with recurrent renal cell cancer. Right adrenal metastasis. The peritoneal nodules are concerning for metastasis. Gallbladder nodule is suspicious for metastasis. MRI whole spine revealed no lytic or blastic lesions. No abnormal enhancement. No mass or cord compression to suggest metastases.  MRI brain showed a left parietal cystic mass in the brain. She was seen by Dr. Thayer, underwent drainage for her cystic lesion on Dec 18, 2018. Pathology of the fluid was negative for malignant cells. She is on dexamethasone twice daily. She is feeling much better for her right side extremity weakness. On December 19 she underwent ultrasound-guided biopsy of a right lower quadrant metastatic lesion. 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   [de-identified] : clear cell renal cell carcinoma [de-identified] : Her brain cystic lesion with fluid accumulation on drainage however recently there was a problem for her drainage with presentation of right side body weakness. She received gamma-knife in Feb and has been on keppra and started to taper dexamethasone without seizure history. She is on physical therapy, an hour xtwo times a week.

## 2019-03-10 NOTE — CONSULT LETTER
[Dear  ___] : Dear  [unfilled], [Consult Letter:] : I had the pleasure of evaluating your patient, [unfilled]. [Please see my note below.] : Please see my note below. [Consult Closing:] : Thank you very much for allowing me to participate in the care of this patient.  If you have any questions, please do not hesitate to contact me. [Sincerely,] : Sincerely, [DrAunm  ___] : Dr. LINTON [FreeTextEntry3] : Simona Navarrete MD

## 2019-03-20 RX ORDER — ENOXAPARIN SODIUM 100 MG/ML
60 INJECTION SUBCUTANEOUS
Refills: 0 | Status: DISCONTINUED | COMMUNITY
End: 2019-03-20

## 2019-03-20 RX ORDER — RIVAROXABAN 20 MG/1
20 TABLET, FILM COATED ORAL
Qty: 90 | Refills: 3 | Status: ACTIVE | COMMUNITY
Start: 2019-03-20 | End: 1900-01-01

## 2019-04-03 ENCOUNTER — FORM ENCOUNTER (OUTPATIENT)
Age: 84
End: 2019-04-03

## 2019-04-04 ENCOUNTER — APPOINTMENT (OUTPATIENT)
Dept: CT IMAGING | Facility: IMAGING CENTER | Age: 84
End: 2019-04-04
Payer: MEDICARE

## 2019-04-04 ENCOUNTER — APPOINTMENT (OUTPATIENT)
Dept: NEUROSURGERY | Facility: CLINIC | Age: 84
End: 2019-04-04
Payer: MEDICARE

## 2019-04-04 ENCOUNTER — OUTPATIENT (OUTPATIENT)
Dept: OUTPATIENT SERVICES | Facility: HOSPITAL | Age: 84
LOS: 1 days | End: 2019-04-04
Payer: MEDICARE

## 2019-04-04 VITALS
DIASTOLIC BLOOD PRESSURE: 54 MMHG | OXYGEN SATURATION: 97 % | SYSTOLIC BLOOD PRESSURE: 128 MMHG | HEART RATE: 78 BPM | RESPIRATION RATE: 16 BRPM | TEMPERATURE: 98.8 F

## 2019-04-04 DIAGNOSIS — R20.8 OTHER DISTURBANCES OF SKIN SENSATION: ICD-10-CM

## 2019-04-04 DIAGNOSIS — Z90.5 ACQUIRED ABSENCE OF KIDNEY: Chronic | ICD-10-CM

## 2019-04-04 DIAGNOSIS — C64.9 MALIGNANT NEOPLASM OF UNSPECIFIED KIDNEY, EXCEPT RENAL PELVIS: ICD-10-CM

## 2019-04-04 DIAGNOSIS — Z00.8 ENCOUNTER FOR OTHER GENERAL EXAMINATION: ICD-10-CM

## 2019-04-04 PROCEDURE — 70450 CT HEAD/BRAIN W/O DYE: CPT

## 2019-04-04 PROCEDURE — 99024 POSTOP FOLLOW-UP VISIT: CPT

## 2019-04-04 PROCEDURE — 70450 CT HEAD/BRAIN W/O DYE: CPT | Mod: 26

## 2019-04-04 RX ORDER — DEXAMETHASONE 6 MG/1
TABLET ORAL
Refills: 0 | Status: DISCONTINUED | COMMUNITY
End: 2019-04-04

## 2019-04-04 NOTE — ASSESSMENT
[FreeTextEntry1] : Mrs. Felix is doing remarkable well postoperatively. She is living independent, doing home chores, independent ADLS and has resumed her normal social calendar. Up, dressed and out of the house by 9 am. She is driving without reported difficulty. She drove from [employer] today to her daughter's house this morning. Walking without a cane. She has one but does not use it. Managing her bank books and checking accounts. Does not report any difficulty with memory. All of these have been confirmed with her daughter, whom accompanied her to this visit.\par \par The CT shows improvement in the cerebral edema. The completion of her steroid tamper and the reported change in sensation is appreciated. Although she is asymptomatic at this evaluation we discussed options to minimize the numbness as the edema resolves. Encouraged rest periodically through the day. Journaling symptoms if they reoccur to report. We discussed restarting the dexamethasone at this time. She had significant side effect from the dexamethasone (insomnia, "jitters"). She declined at this time. She was encourage to call the office if these symptoms occur again. She was instructed not to drive until all symptoms have resolved completely for a length of time.\par \par Patient is scheduled for an MRI +/- contrast and an office visit in 3 week. She will call or RTO if symptoms occur again.

## 2019-04-04 NOTE — HISTORY OF PRESENT ILLNESS
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 On 2/15/19, she returned back to ED for sudden onset of right side numbness and headache. Ommaya was attempted to be tapped but unable to withdraw cystic fluid. On 2/18, she underwent craniotomy for resection. Her hospitalization was c/b b/l LE DVT, PE (s/p IVC filter on 2/17). She was discharged to home on 2/22. \par Surgical incision is well healed.\par Last dose of dexamethasone was on 3/26/2019.

## 2019-04-04 NOTE — REASON FOR VISIT
[Follow-Up: _____] : a [unfilled] follow-up visit [Family Member] : family member [FreeTextEntry1] : Patient reports intermittent decrease sensation in her right foot. "Just doesn't feel the same as the left."  She has been keeping her same busy routine. Up early and out all day. No difficulty walking or driving. Strength in the right foot is normal. She is % week 2days post-op. Last dose of dexamethasone was last week on 3/26/2019.\par \par Patient had a CT scan today for evaluation.

## 2019-04-04 NOTE — DATA REVIEWED
[de-identified] : EXAM: CT BRAIN \par \par \par PROCEDURE DATE: 04/04/2019 \par \par \par \par INTERPRETATION: INDICATIONS: Brain metastasis, follow-up \par \par TECHNIQUE: Serial axial images were obtained from the skull base to the \par vertex without intravenous contrast. \par \par COMPARISON EXAMINATION: 2/21/2019 which is an MR \par \par FINDINGS: \par VENTRICLES AND SULCI: Decrease mass effect on the left lateral ventricle is \par appreciated. \par INTRA-AXIAL: Residual edema in the high left posterior frontal parietal \par region is seen. This is decreased compared with prior MR and CT of 2/20 and \par 2/21/2019 respectively \par EXTRA-AXIAL: No mass or collection is seen. \par VISUALIZED SINUSES: Clear. \par VISUALIZED MASTOIDS: Clear. \par CALVARIUM: Again seen is the left parietal craniotomy which was recent on \par the prior CT and MR 2/20/2019 \par MISCELLANEOUS: None. \par \par IMPRESSION: Decreased edema in the left corona radiata centrum semiovale \par ovale region compared with the patient's prior 2/20/2019 and 2/21/2019. No \par new lesions identified. Evaluation for residual neoplasm is limited without \par the presence of intravenous contrast. \par

## 2019-04-04 NOTE — PHYSICAL EXAM
[General Appearance - Alert] : alert [General Appearance - In No Acute Distress] : in no acute distress [Well-Healed] : well-healed [No Drainage] : without drainage [Normal Skin] : normal [Normal Skin Turgor] : skin turgor was normal [Oriented To Time, Place, And Person] : oriented to person, place, and time [Impaired Insight] : insight and judgment were intact [Affect] : the affect was normal [Person] : oriented to person [Place] : oriented to place [Time] : oriented to time [Short Term Intact] : short term memory intact [Remote Intact] : remote memory intact [Span Intact] : the attention span was normal [Concentration Intact] : normal concentrating ability [Fluency] : fluency intact [Comprehension] : comprehension intact [Current Events] : adequate knowledge of current events [Past History] : adequate knowledge of personal past history [Vocabulary] : adequate range of vocabulary [Cranial Nerves Optic (II)] : visual acuity intact bilaterally,  pupils equal round and reactive to light [Cranial Nerves Oculomotor (III)] : extraocular motion intact [Cranial Nerves Trigeminal (V)] : facial sensation intact symmetrically [Cranial Nerves Facial (VII)] : face symmetrical [Cranial Nerves Vestibulocochlear (VIII)] : hearing was intact bilaterally [Cranial Nerves Glossopharyngeal (IX)] : tongue and palate midline [Cranial Nerves Accessory (XI - Cranial And Spinal)] : head turning and shoulder shrug symmetric [Cranial Nerves Hypoglossal (XII)] : there was no tongue deviation with protrusion [Motor Strength] : muscle strength was normal in all four extremities [No Muscle Atrophy] : normal bulk in all four extremities [Motor Handedness Right-Handed] : the patient is right hand dominant [Sensation Tactile Decrease] : light touch was intact [Sensation Pain / Temperature Decrease] : pain and temperature was intact [Sensation Vibration Decrease] : vibration was intact [Proprioception] : proprioception was intact [Romberg's Sign] : Romberg's sign was negtive [Dysesthesia] : no dysesthesia [Hyperesthesia] : no hyperesthesia [Balance] : balance was intact [Limited Balance] : balance was intact [Past-pointing] : there was no past-pointing [Tremor] : no tremor present [2+] : Patella left 2+ [Plantar Reflex Right Only] : normal on the right [Plantar Reflex Left Only] : normal on the left [___] : absent on the right [___] : absent on the left [FreeTextEntry7] : all sensory to right foot normal on exam [Sclera] : the sclera and conjunctiva were normal [PERRL With Normal Accommodation] : pupils were equal in size, round, reactive to light, with normal accommodation [Extraocular Movements] : extraocular movements were intact [Outer Ear] : the ears and nose were normal in appearance [Oropharynx] : the oropharynx was normal [Respiration, Rhythm And Depth] : normal respiratory rhythm and effort [Auscultation Breath Sounds / Voice Sounds] : lungs were clear to auscultation bilaterally [Apical Impulse] : the apical impulse was normal [Heart Sounds] : normal S1 and S2 [Full Pulse] : the pedal pulses are present [FreeTextEntry1] : trace b/l le edema [Abnormal Walk] : normal gait [Nail Clubbing] : no clubbing  or cyanosis of the fingernails [Musculoskeletal - Swelling] : no joint swelling seen [Motor Tone] : muscle strength and tone were normal [Skin Color & Pigmentation] : normal skin color and pigmentation [Skin Turgor] : normal skin turgor [] : no rash

## 2019-04-08 ENCOUNTER — EMERGENCY (EMERGENCY)
Facility: HOSPITAL | Age: 84
LOS: 1 days | Discharge: LEFT BEFORE DISCHARGE | End: 2019-04-08
Attending: STUDENT IN AN ORGANIZED HEALTH CARE EDUCATION/TRAINING PROGRAM
Payer: MEDICARE

## 2019-04-08 VITALS
SYSTOLIC BLOOD PRESSURE: 146 MMHG | DIASTOLIC BLOOD PRESSURE: 72 MMHG | RESPIRATION RATE: 16 BRPM | OXYGEN SATURATION: 93 % | HEART RATE: 98 BPM | WEIGHT: 138.01 LBS | HEIGHT: 58 IN | TEMPERATURE: 98 F

## 2019-04-08 DIAGNOSIS — Z90.5 ACQUIRED ABSENCE OF KIDNEY: Chronic | ICD-10-CM

## 2019-04-08 PROCEDURE — 85730 THROMBOPLASTIN TIME PARTIAL: CPT

## 2019-04-08 PROCEDURE — 86900 BLOOD TYPING SEROLOGIC ABO: CPT

## 2019-04-08 PROCEDURE — 86901 BLOOD TYPING SEROLOGIC RH(D): CPT

## 2019-04-08 PROCEDURE — 36415 COLL VENOUS BLD VENIPUNCTURE: CPT

## 2019-04-08 PROCEDURE — 93005 ELECTROCARDIOGRAM TRACING: CPT

## 2019-04-08 PROCEDURE — 85027 COMPLETE CBC AUTOMATED: CPT

## 2019-04-08 PROCEDURE — 85610 PROTHROMBIN TIME: CPT

## 2019-04-08 PROCEDURE — 80053 COMPREHEN METABOLIC PANEL: CPT

## 2019-04-08 PROCEDURE — 99285 EMERGENCY DEPT VISIT HI MDM: CPT | Mod: 25

## 2019-04-08 PROCEDURE — 99284 EMERGENCY DEPT VISIT MOD MDM: CPT | Mod: 25

## 2019-04-08 PROCEDURE — 36430 TRANSFUSION BLD/BLD COMPNT: CPT

## 2019-04-08 PROCEDURE — 93010 ELECTROCARDIOGRAM REPORT: CPT

## 2019-04-08 PROCEDURE — 86850 RBC ANTIBODY SCREEN: CPT

## 2019-04-08 NOTE — ED STATDOCS - OBJECTIVE STATEMENT
84 y/o female with pmhx of renal cancer, HTN, and hypothyroidism presents with low H&H today. +SOB on exertion, dizziness, lightheadedness, CP, and weakness. Pt went to PMD today for f/u s/p benign brain mass removal x1 month ago and SOB. Pt blood test showed Hemoglobin 6.8 and PMD directed pt to ED for eval. Denies syncope.    **pt seen in waiting room for ED triage - comprehensive history and physical is not performed by me - patient to be sent to main ED for full history / physical and medical evaluation - all orders placed by me to be followed by MD in main ED**

## 2019-04-08 NOTE — ED CLERICAL - NS ED CLERK NOTE PRE-ARRIVAL INFORMATION; ADDITIONAL PRE-ARRIVAL INFORMATION
CC/Reason For referral: sob, hemoglobin 6.8 (Frequent Flyer)  Preferred Consultant(if applicable):  Who admits for you (if needed):  Do you have documents you would like to fax over?  Would you still like to speak to an ED attending? no

## 2019-04-08 NOTE — ED STATDOCS - PROGRESS NOTE DETAILS
Attending MD Treviño: This patient was seen and orders were placed by the PA.  I was present in the Emergency Department and available to the PA during the time this patient was seen and evaluated.  Although I was available for any questions or concerns, I was not consulted on or asked to evaluate this patient.  I did not perform a face to face diagnostic evaluation nor did I discuss the history, exam and plan of care with the PA.

## 2019-04-09 VITALS
OXYGEN SATURATION: 93 % | RESPIRATION RATE: 17 BRPM | TEMPERATURE: 98 F | DIASTOLIC BLOOD PRESSURE: 71 MMHG | SYSTOLIC BLOOD PRESSURE: 115 MMHG | HEART RATE: 93 BPM

## 2019-04-09 NOTE — ED PROVIDER NOTE - NSFOLLOWUPCLINICS_GEN_ALL_ED_FT
[facility]  Gastroenterology  [address] [place] [id number]  Phone: [phone]  Fax:   Follow Up Time:

## 2019-04-09 NOTE — ED PROVIDER NOTE - OBJECTIVE STATEMENT
84 y/o F w/ PMH of RCC, HTN, hypothyroid presenting w/ low hemoglobin. Pt had bloodwork done by her PCP Dr. Hannah Escobar on 4/7 and was found to have a hemoglobin "6 point something." Pt was told to come to the ED. Pt reports increased fatigue, shortness of breath, and dyspnea on exertion for the past week. Pt reports being anemic in the past and requiring blood transfusions. Pt reports feeling the same symptoms now as she did when she last required a transfusion. She had been having black stools then but currently is not having black stools. Pt was told she may have a bleeding ulcer at that time but refused any additional testing to help determine cause of anemia.

## 2019-04-09 NOTE — ED ADULT NURSE NOTE - NSIMPLEMENTINTERV_GEN_ALL_ED
Implemented All Fall with Harm Risk Interventions:  [place] to call system. Call bell, personal items and telephone within reach. Instruct patient to call for assistance. Room bathroom lighting operational. Non-slip footwear when patient is off stretcher. Physically safe environment: no spills, clutter or unnecessary equipment. Stretcher in lowest position, wheels locked, appropriate side rails in place. Provide visual cue, wrist band, yellow gown, etc. Monitor gait and stability. Monitor for mental status changes and reorient to person, place, and time. Review medications for side effects contributing to fall risk. Reinforce activity limits and safety measures with patient and family. Provide visual clues: red socks.

## 2019-04-09 NOTE — ED ADULT NURSE REASSESSMENT NOTE - NS ED NURSE REASSESS COMMENT FT1
1 unit PRBC's initiated. Consent in chart. Risks and benefits of administering product explained to patient. Patient verbalized understanding of risks and benefits. Patient educated on side effects to look out for. VSS. Second RN at beside for confirmation of product and pt identification. Will cont to monitor.
Infusion complete. Patient tolerated transfusion well. No reactions noted. Will re vital in 30 minutes.
Received report from night RN Gunjan. Patient awaiting dispo, patient states that ED resident told her that she would be discharged this morning. Pt feels well.

## 2019-04-09 NOTE — ED PROVIDER NOTE - PROGRESS NOTE DETAILS
Dr. Kristian Downs, PGY-1: pt requesting to leave after her transfusion. She was advised to stay for admission for further monitoring of her hemoglobin and for workup of her anemia considering pt reports not knowing the cause for her anemia at this time. Pt was explained the risks of leaving including life threatening hemorrhage and pt agreed to accept these risks. Pt reports she will follow up with her doctor for GI evaluation. Pt is A&O x3 and has decision making capacity. Pt signed AMA paperwork. Dr. Kristian Downs, PGY-1: pt requesting to leave after her transfusion. She was advised to stay for admission for further monitoring of her hemoglobin and for workup of her anemia considering pt reports not knowing the cause for her anemia at this time. Pt was explained the risks of leaving including life threatening hemorrhage and pt agreed to accept these risks. Pt reports she will follow up with her doctor for GI evaluation. Pt is A&O x3 and has decision making capacity. Pt signed AMA paperwork.  Rosalino Rios DO: agree with above. patient with decisional capacity. I independently discussed leaving AMA and patient understood the risks of leaving, the importance of close follow up and that she could return to our ED or any hospital at any time to continue her care.

## 2019-04-09 NOTE — ED PROVIDER NOTE - CLINICAL SUMMARY MEDICAL DECISION MAKING FREE TEXT BOX
84 y/o F w/ RCC, HTN, hypothyroid now with presumed symptomatic anemia. Will check CBC, CMP, VBG, Type and screen. Will transfuse if necessary. Reassess.

## 2019-04-09 NOTE — ED ADULT NURSE NOTE - OBJECTIVE STATEMENT
82y/o female with history of renal CA, HTN and anemia walked into ED a&ox3 c/o SOB. Patient reports for the past week and a half she has been having intermittent dizziness and SOB with exertion. Went to PCP today and had blood work done. Received call later in day that her hemoglobin was 6.8, told to come to ED for further eval. Patient seen by QDOC in triage, blood drawn and sent to lab. IV in place to left wrist. Patient currently denies CP, blood in stool, N/V/D, fever, SOB at rest, HA, vision changes, dizziness, urinary symptoms. Lungs clear b/l. Abd soft, nontender, nondistended. MD Downs at bedside for eval.

## 2019-04-09 NOTE — ED PROVIDER NOTE - NSFOLLOWUPINSTRUCTIONS_ED_ALL_ED_FT
1) Follow up with your doctor this week. You were also provided information for the GI doctors  2) Return to the ED immediately for new or worsening symptoms  3) Your test results from your ED visit were discussed with you prior to discharge  4) You were provided with a copy of your test results  5) You were explained the risks associated with leaving the hospital and refusing further evaluation  6) If you change your mind at any point please feel free to return to the emergency department

## 2019-04-09 NOTE — ED PROVIDER NOTE - NS ED ROS FT
GENERAL: No fever or chills, +fatigue  EYES: No change in vision  HEENT: No trouble swallowing or speaking  CARDIAC: No chest pain  PULMONARY: No cough, +SOB  GI: No abdominal pain, no nausea or no vomiting, no diarrhea or constipation  : No changes in urination  SKIN: No rashes  NEURO: No headache, no numbness  MSK: No joint pain  Otherwise as HPI or negative.

## 2019-04-09 NOTE — ED PROVIDER NOTE - ATTENDING CONTRIBUTION TO CARE
on AC. had outpt low outpt h/h and sent to ED. has occurred before. does report fatigue, sob f ro one week. dark stools until 2-3 days ago.   exam wnl  plan transfuse. unclear etiology to hb drop. several discussions while in with pt regarding admission for further work up, especially given recent dark stools. pt feeling better post transfusion and refusing to stay for further w/u. understands risk of leaving and reports will f/u with pcp and gi. pt left ama 82 yo Lolly AC had low outpt h/h and sent to ED. Has occurred before. On ROS does report fatigue, sob for one week. dark stools until 2-3 days ago.   exam wnl  plan repeat labs and transfuse. unclear etiology to hb drop. several discussions while in with pt regarding admission for further work up, especially given recent dark stools. pt feeling better post transfusion and refusing to stay for further w/u. understands risk of leaving and reports will f/u with pcp and gi. pt left ama

## 2019-04-09 NOTE — ED PROVIDER NOTE - PHYSICAL EXAMINATION
Gen: NAD, AOx3, able to make needs known, non-toxic  Head: NCAT  HEENT: EOMI, oral mucosa moist, normal conjunctiva  Lung: CTAB, no respiratory distress, no wheezes/rhonchi/rales B/L, speaking in full sentences  CV: RRR, no murmurs  Abd: soft, NTND, no guarding  MSK: no visible deformities  Neuro: No focal sensory or motor deficits  Skin: Warm, well perfused  Psych: normal affect

## 2019-04-23 ENCOUNTER — FORM ENCOUNTER (OUTPATIENT)
Age: 84
End: 2019-04-23

## 2019-04-24 ENCOUNTER — OUTPATIENT (OUTPATIENT)
Dept: OUTPATIENT SERVICES | Facility: HOSPITAL | Age: 84
LOS: 1 days | End: 2019-04-24
Payer: MEDICARE

## 2019-04-24 ENCOUNTER — APPOINTMENT (OUTPATIENT)
Dept: NEUROSURGERY | Facility: CLINIC | Age: 84
End: 2019-04-24
Payer: MEDICARE

## 2019-04-24 ENCOUNTER — APPOINTMENT (OUTPATIENT)
Dept: MRI IMAGING | Facility: IMAGING CENTER | Age: 84
End: 2019-04-24
Payer: MEDICARE

## 2019-04-24 DIAGNOSIS — Z90.5 ACQUIRED ABSENCE OF KIDNEY: Chronic | ICD-10-CM

## 2019-04-24 DIAGNOSIS — C79.31 SECONDARY MALIGNANT NEOPLASM OF BRAIN: ICD-10-CM

## 2019-04-24 PROCEDURE — A9585: CPT

## 2019-04-24 PROCEDURE — 70553 MRI BRAIN STEM W/O & W/DYE: CPT | Mod: 26

## 2019-04-24 PROCEDURE — 70553 MRI BRAIN STEM W/O & W/DYE: CPT

## 2019-04-24 PROCEDURE — 99024 POSTOP FOLLOW-UP VISIT: CPT

## 2019-04-24 NOTE — ASSESSMENT
[FreeTextEntry1] : 83 year old female with hx RCC, s/p nephrectomy now 2months postoperative from left parietal craniotomy for resection of renal cell carcinoma met\par \par -Repeat MRI in 3 months time, w/ w/o contrast\par -F/u oncologist (Rosalba) regarding treatment of systemic disease\par -continue monitor off decadron and keppra

## 2019-04-24 NOTE — PHYSICAL EXAM
[FreeTextEntry1] : Alert and oriented x 3\par She is present with her son-in-law\par She is pleasant in no apparent distress\par Her cranial incision is well healed\par She has full strength in all extremities\par She has no numbness in the RLE or RUE extremity\par Her gait is normal, she has mild balance issues\par Her visual fields are full\par Her visual acuity is 20/20

## 2019-04-24 NOTE — REASON FOR VISIT
[Family Member] : family member [Follow-Up: _____] : a [unfilled] follow-up visit [FreeTextEntry1] : 1 month postop for craniotomy resection left parietal lesion

## 2019-04-30 ENCOUNTER — APPOINTMENT (OUTPATIENT)
Dept: VASCULAR SURGERY | Facility: CLINIC | Age: 84
End: 2019-04-30
Payer: MEDICARE

## 2019-04-30 VITALS
HEIGHT: 56 IN | TEMPERATURE: 98.1 F | WEIGHT: 140 LBS | DIASTOLIC BLOOD PRESSURE: 68 MMHG | SYSTOLIC BLOOD PRESSURE: 155 MMHG | BODY MASS INDEX: 31.49 KG/M2 | HEART RATE: 101 BPM

## 2019-04-30 DIAGNOSIS — I82.412 ACUTE EMBOLISM AND THROMBOSIS OF LEFT FEMORAL VEIN: ICD-10-CM

## 2019-04-30 DIAGNOSIS — I82.402 ACUTE EMBOLISM AND THROMBOSIS OF UNSPECIFIED DEEP VEINS OF LEFT LOWER EXTREMITY: ICD-10-CM

## 2019-04-30 PROCEDURE — 93970 EXTREMITY STUDY: CPT

## 2019-04-30 PROCEDURE — 99213 OFFICE O/P EST LOW 20 MIN: CPT

## 2019-04-30 RX ORDER — ALBUTEROL SULFATE 108 UG/1
108 (90 BASE) AEROSOL, METERED RESPIRATORY (INHALATION)
Qty: 20 | Refills: 0 | Status: ACTIVE | COMMUNITY
Start: 2019-01-21

## 2019-04-30 NOTE — DATA REVIEWED
[FreeTextEntry1] : 4/30/2019 Venous Duplex RLE no acute dvt svt\par                                        LLE sig for subacute non occ dvt in distal fem v

## 2019-04-30 NOTE — ASSESSMENT
[FreeTextEntry1] : Impression left leg dvt w slow resolution and pulmonary embolus\par \par Plan Med Conservative management leg elevation, knee high compression stockings 15-20mm Hg (rx offered to pt pt refuses), wt loss, diet control, exercise program, protective measures\par continue anticoag rx for an additional 2-3 mo and f/u w pulmonary \par ov w lle venous duplex 2-3mo s/o dvt\par \par \par  [Arterial/Venous Disease] : arterial/venous disease [Medication Management] : medication management [Other: _____] : [unfilled]

## 2019-04-30 NOTE — HISTORY OF PRESENT ILLNESS
[FreeTextEntry1] : pt was seen in consulation from neurosurg service  dx w left cfv and gsv dvt and pulmonary embolus\par pt is on xarelto\par pt saw pulmonary recently\par pt has no le c/o at this time

## 2019-04-30 NOTE — PHYSICAL EXAM
[Normal Breath Sounds] : Normal breath sounds [1+] : left 1+ [2+] : left 2+ [Ankle Swelling (On Exam)] : present [Ankle Swelling Bilaterally] : bilaterally  [Varicose Veins Of Lower Extremities] : bilaterally [] : bilaterally [Ankle Swelling On The Right] : mild [No HSM] : no hepatosplenomegaly [No Rash or Lesion] : No rash or lesion [Alert] : alert [Oriented to Person] : oriented to person [Oriented to Place] : oriented to place [Oriented to Time] : oriented to time [Calm] : calm [JVD] : no jugular venous distention  [Right Carotid Bruit] : no bruit heard over the right carotid [Left Carotid Bruit] : no bruit heard over the left carotid [Abdomen Masses] : No abdominal masses [Tender] : was nontender [Stool Sample Taken] : No stool obtained  on rectal exam [de-identified] : nad [de-identified] : wnl [FreeTextEntry1] : Moderate bilateral leg venous insufficiency \par w mild  bilateral leg stasis dermatitis \par and mild  bilateral leg edema \par Multiple  bilateral leg small varicose  veins and spider veins calf and shin \par no wounds/ulcers\par  [de-identified] : wnl [de-identified] : wnl [de-identified] : Dom Cranial nerves 2-12 dom grossly intact [de-identified] : cooperative

## 2019-05-13 ENCOUNTER — INPATIENT (INPATIENT)
Facility: HOSPITAL | Age: 84
LOS: 2 days | Discharge: HOME | End: 2019-05-16
Attending: HOSPITALIST | Admitting: HOSPITALIST
Payer: MEDICARE

## 2019-05-13 VITALS
RESPIRATION RATE: 16 BRPM | HEART RATE: 99 BPM | TEMPERATURE: 99 F | SYSTOLIC BLOOD PRESSURE: 138 MMHG | DIASTOLIC BLOOD PRESSURE: 51 MMHG | OXYGEN SATURATION: 98 %

## 2019-05-13 DIAGNOSIS — D64.9 ANEMIA, UNSPECIFIED: ICD-10-CM

## 2019-05-13 DIAGNOSIS — Z90.5 ACQUIRED ABSENCE OF KIDNEY: Chronic | ICD-10-CM

## 2019-05-13 DIAGNOSIS — G93.9 DISORDER OF BRAIN, UNSPECIFIED: Chronic | ICD-10-CM

## 2019-05-13 LAB
ALBUMIN SERPL ELPH-MCNC: 3.93 G/DL — SIGNIFICANT CHANGE UP (ref 3.3–5)
ALBUMIN SERPL ELPH-MCNC: 3.93 G/DL — SIGNIFICANT CHANGE UP (ref 3.3–5)
ALP SERPL-CCNC: 86 U/L — SIGNIFICANT CHANGE UP (ref 40–120)
ALP SERPL-CCNC: 86 U/L — SIGNIFICANT CHANGE UP (ref 40–120)
ALT FLD-CCNC: 12 U/L — SIGNIFICANT CHANGE UP (ref 4–33)
ALT FLD-CCNC: 12 U/L — SIGNIFICANT CHANGE UP (ref 4–33)
ANION GAP SERPL CALC-SCNC: 13 MMO/L — SIGNIFICANT CHANGE UP (ref 7–14)
ANION GAP SERPL CALC-SCNC: 13 MMO/L — SIGNIFICANT CHANGE UP (ref 7–14)
APTT BLD: 34.9 SEC — SIGNIFICANT CHANGE UP (ref 27.5–36.3)
AST SERPL-CCNC: 15 U/L — SIGNIFICANT CHANGE UP (ref 4–32)
AST SERPL-CCNC: 15 U/L — SIGNIFICANT CHANGE UP (ref 4–32)
BASOPHILS # BLD AUTO: 0.03 K/UL — SIGNIFICANT CHANGE UP (ref 0–0.2)
BASOPHILS NFR BLD AUTO: 0.3 % — SIGNIFICANT CHANGE UP (ref 0–2)
BILIRUB SERPL-MCNC: 0.2 MG/DL — SIGNIFICANT CHANGE UP (ref 0.2–1.2)
BILIRUB SERPL-MCNC: 0.2 MG/DL — SIGNIFICANT CHANGE UP (ref 0.2–1.2)
BLD GP AB SCN SERPL QL: NEGATIVE — SIGNIFICANT CHANGE UP
BUN SERPL-MCNC: 20 MG/DL — SIGNIFICANT CHANGE UP (ref 7–23)
BUN SERPL-MCNC: 20 MG/DL — SIGNIFICANT CHANGE UP (ref 7–23)
CALCIUM SERPL-MCNC: 9.4 MG/DL — SIGNIFICANT CHANGE UP (ref 8.4–10.5)
CALCIUM SERPL-MCNC: 9.4 MG/DL — SIGNIFICANT CHANGE UP (ref 8.4–10.5)
CHLORIDE SERPL-SCNC: 105 MMOL/L — SIGNIFICANT CHANGE UP (ref 98–107)
CHLORIDE SERPL-SCNC: 105 MMOL/L — SIGNIFICANT CHANGE UP (ref 98–107)
CO2 SERPL-SCNC: 21 MMOL/L — LOW (ref 22–31)
CO2 SERPL-SCNC: 21 MMOL/L — LOW (ref 22–31)
CREAT SERPL-MCNC: 0.84 MG/DL — SIGNIFICANT CHANGE UP (ref 0.5–1.3)
CREAT SERPL-MCNC: 0.84 MG/DL — SIGNIFICANT CHANGE UP (ref 0.5–1.3)
EOSINOPHIL # BLD AUTO: 0.18 K/UL — SIGNIFICANT CHANGE UP (ref 0–0.5)
EOSINOPHIL NFR BLD AUTO: 1.8 % — SIGNIFICANT CHANGE UP (ref 0–6)
GLUCOSE SERPL-MCNC: 108 MG/DL — HIGH (ref 70–99)
GLUCOSE SERPL-MCNC: 108 MG/DL — HIGH (ref 70–99)
HCT VFR BLD CALC: 29.5 % — LOW (ref 34.5–45)
HCT VFR BLD CALC: 29.5 % — LOW (ref 34.5–45)
HGB BLD-MCNC: 8.1 G/DL — LOW (ref 11.5–15.5)
HGB BLD-MCNC: 8.1 G/DL — LOW (ref 11.5–15.5)
IMM GRANULOCYTES NFR BLD AUTO: 0.6 % — SIGNIFICANT CHANGE UP (ref 0–1.5)
INR BLD: 1.52 — HIGH (ref 0.88–1.17)
LYMPHOCYTES # BLD AUTO: 2.17 K/UL — SIGNIFICANT CHANGE UP (ref 1–3.3)
LYMPHOCYTES # BLD AUTO: 21.3 % — SIGNIFICANT CHANGE UP (ref 13–44)
MAGNESIUM SERPL-MCNC: 2.1 MG/DL — SIGNIFICANT CHANGE UP (ref 1.6–2.6)
MCHC RBC-ENTMCNC: 18.9 PG — LOW (ref 27–34)
MCHC RBC-ENTMCNC: 18.9 PG — LOW (ref 27–34)
MCHC RBC-ENTMCNC: 27.5 % — LOW (ref 32–36)
MCHC RBC-ENTMCNC: 27.5 % — LOW (ref 32–36)
MCV RBC AUTO: 68.8 FL — LOW (ref 80–100)
MCV RBC AUTO: 68.8 FL — LOW (ref 80–100)
MONOCYTES # BLD AUTO: 0.87 K/UL — SIGNIFICANT CHANGE UP (ref 0–0.9)
MONOCYTES NFR BLD AUTO: 8.5 % — SIGNIFICANT CHANGE UP (ref 2–14)
NEUTROPHILS # BLD AUTO: 6.88 K/UL — SIGNIFICANT CHANGE UP (ref 1.8–7.4)
NEUTROPHILS NFR BLD AUTO: 67.5 % — SIGNIFICANT CHANGE UP (ref 43–77)
NRBC # FLD: 0 K/UL — SIGNIFICANT CHANGE UP (ref 0–0)
NRBC # FLD: 0 K/UL — SIGNIFICANT CHANGE UP (ref 0–0)
OB PNL STL: POSITIVE — SIGNIFICANT CHANGE UP
PHOSPHATE SERPL-MCNC: 4 MG/DL — SIGNIFICANT CHANGE UP (ref 2.5–4.5)
PLATELET # BLD AUTO: 394 K/UL — SIGNIFICANT CHANGE UP (ref 150–400)
PLATELET # BLD AUTO: 394 K/UL — SIGNIFICANT CHANGE UP (ref 150–400)
PMV BLD: 9.7 FL — SIGNIFICANT CHANGE UP (ref 7–13)
PMV BLD: 9.7 FL — SIGNIFICANT CHANGE UP (ref 7–13)
POTASSIUM SERPL-MCNC: 4.3 MMOL/L — SIGNIFICANT CHANGE UP (ref 3.5–5.3)
POTASSIUM SERPL-MCNC: 4.3 MMOL/L — SIGNIFICANT CHANGE UP (ref 3.5–5.3)
POTASSIUM SERPL-SCNC: 4.3 MMOL/L — SIGNIFICANT CHANGE UP (ref 3.5–5.3)
POTASSIUM SERPL-SCNC: 4.3 MMOL/L — SIGNIFICANT CHANGE UP (ref 3.5–5.3)
PROT SERPL-MCNC: 7.1 G/DL — SIGNIFICANT CHANGE UP (ref 6–8.3)
PROT SERPL-MCNC: 7.1 G/DL — SIGNIFICANT CHANGE UP (ref 6–8.3)
PROTHROM AB SERPL-ACNC: 17.5 SEC — HIGH (ref 9.8–13.1)
RBC # BLD: 4.29 M/UL — SIGNIFICANT CHANGE UP (ref 3.8–5.2)
RBC # BLD: 4.29 M/UL — SIGNIFICANT CHANGE UP (ref 3.8–5.2)
RBC # FLD: 20.3 % — HIGH (ref 10.3–14.5)
RBC # FLD: 20.3 % — HIGH (ref 10.3–14.5)
RH IG SCN BLD-IMP: POSITIVE — SIGNIFICANT CHANGE UP
SODIUM SERPL-SCNC: 139 MMOL/L — SIGNIFICANT CHANGE UP (ref 135–145)
SODIUM SERPL-SCNC: 139 MMOL/L — SIGNIFICANT CHANGE UP (ref 135–145)
WBC # BLD: 10.19 K/UL — SIGNIFICANT CHANGE UP (ref 3.8–10.5)
WBC # BLD: 10.19 K/UL — SIGNIFICANT CHANGE UP (ref 3.8–10.5)
WBC # FLD AUTO: 10.19 K/UL — SIGNIFICANT CHANGE UP (ref 3.8–10.5)
WBC # FLD AUTO: 10.19 K/UL — SIGNIFICANT CHANGE UP (ref 3.8–10.5)

## 2019-05-13 PROCEDURE — 99223 1ST HOSP IP/OBS HIGH 75: CPT

## 2019-05-13 PROCEDURE — 71046 X-RAY EXAM CHEST 2 VIEWS: CPT | Mod: 26

## 2019-05-13 RX ORDER — LEVOTHYROXINE SODIUM 125 MCG
1 TABLET ORAL
Qty: 0 | Refills: 0 | DISCHARGE

## 2019-05-13 RX ORDER — VERAPAMIL HCL 240 MG
1 CAPSULE, EXTENDED RELEASE PELLETS 24 HR ORAL
Qty: 0 | Refills: 0 | DISCHARGE

## 2019-05-13 RX ORDER — VERAPAMIL HCL 240 MG
120 CAPSULE, EXTENDED RELEASE PELLETS 24 HR ORAL DAILY
Refills: 0 | Status: DISCONTINUED | OUTPATIENT
Start: 2019-05-13 | End: 2019-05-13

## 2019-05-13 RX ORDER — LEVOTHYROXINE SODIUM 125 MCG
88 TABLET ORAL DAILY
Refills: 0 | Status: DISCONTINUED | OUTPATIENT
Start: 2019-05-13 | End: 2019-05-16

## 2019-05-13 RX ORDER — PANTOPRAZOLE SODIUM 20 MG/1
40 TABLET, DELAYED RELEASE ORAL
Refills: 0 | Status: DISCONTINUED | OUTPATIENT
Start: 2019-05-13 | End: 2019-05-16

## 2019-05-13 RX ORDER — ESOMEPRAZOLE MAGNESIUM 40 MG/1
1 CAPSULE, DELAYED RELEASE ORAL
Qty: 0 | Refills: 0 | DISCHARGE

## 2019-05-13 RX ORDER — VERAPAMIL HCL 240 MG
120 CAPSULE, EXTENDED RELEASE PELLETS 24 HR ORAL DAILY
Refills: 0 | Status: DISCONTINUED | OUTPATIENT
Start: 2019-05-13 | End: 2019-05-16

## 2019-05-13 RX ADMIN — PANTOPRAZOLE SODIUM 40 MILLIGRAM(S): 20 TABLET, DELAYED RELEASE ORAL at 23:10

## 2019-05-13 NOTE — H&P ADULT - NSICDXPASTMEDICALHX_GEN_ALL_CORE_FT
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

## 2019-05-13 NOTE — H&P ADULT - PROBLEM SELECTOR PLAN 1
- Patient with c/o symptomatic anemia (ADKINS, fatigue/malaise) and some BRBPR (pt states that she does have a history of hemorrhoids also) and dark stools, FOBT here positive  - Concern for slow, ongoing GI bleed at present, suspect upper GI locale most likely but cannot r/o lower GI locale also given c/o BRB (though this might be 2/2 hemorrhoids)   - Will place on PPI 40mg BID IVP for now, GI eval in AM, NPO for GI eval  - On anticoagulation for her recent DVT/PE, currently on Xarelto daily with last dose take around 8AM today, will hold off on further Xarelto dosing for now, patient might need a heparin gtt in AM  - Will check CBC in AM, currently has an active T&S

## 2019-05-13 NOTE — H&P ADULT - PROBLEM SELECTOR PLAN 2
- c/o ADKINS, likely 2/2 symptomatic anemia but might be multifactorial given recent PE and deconditioning from recent surgery  - Will monitor for now - c/o ADKINS, possibly 2/2 symptomatic anemia but might be multifactorial given recent PE and deconditioning from recent surgery and CXR with innumerable mets (likely 2/2 RCC)  - Consider onc eval in AM for further management of her pulmonary metastatic disease, unsure if patient is a candidate for any therapy of the pulm mets (likely 2/2 RCC)

## 2019-05-13 NOTE — ED ADULT NURSE NOTE - OBJECTIVE STATEMENT
p/t with hx PE  and ble DVTs c/o of low blood count, p/t on Xarelto, bloods  drawn and and sent to lab

## 2019-05-13 NOTE — H&P ADULT - NSHPPHYSICALEXAM_GEN_ALL_CORE
Vital Signs Last 24 Hrs  T(C): 36.7 (13 May 2019 22:52), Max: 37.1 (13 May 2019 17:17)  T(F): 98.1 (13 May 2019 22:52), Max: 98.7 (13 May 2019 17:17)  HR: 100 (13 May 2019 22:52) (92 - 100)  BP: 134/59 (13 May 2019 22:52) (134/59 - 141/42)  BP(mean): --  RR: 18 (13 May 2019 22:52) (16 - 18)  SpO2: 99% (13 May 2019 22:52) (94% - 99%)    GENERAL: No acute distress, well-developed  ENT: EOMI, PERRL, conjunctiva and sclera clear, Neck supple, No JVD, moist mucosa  CHEST/LUNG: Clear to auscultation bilaterally; No wheeze, equal breath sounds bilaterally   BACK: No spinal tenderness  HEART: Regular rate and rhythm; No murmurs, rubs, or gallops  ABDOMEN: Soft, Nontender, Nondistended; Bowel sounds present  EXTREMITIES: 1+ L Leg pitting edema  PSYCH: Nl behavior, nl affect  NEUROLOGY: AAOx3, non-focal  SKIN: Normal color, No rashes or lesions

## 2019-05-13 NOTE — H&P ADULT - NSICDXPASTSURGICALHX_GEN_ALL_CORE_FT
PAST SURGICAL HISTORY:  Brain mass cystic brain mass 2/2 RCC s/p resection and placement of ommaya reservoir (Feb 2019)    History of nephrectomy Left nephrectomy 2007

## 2019-05-13 NOTE — H&P ADULT - NSHPLABSRESULTS_GEN_ALL_CORE
LABS and ADDITIONAL STUDIES:                        8.1    10.19 )-----------( 394      ( 13 May 2019 19:45 )             29.5     Occult Blood, Feces (05.13.19 @ 20:04)    Occult Blood: POSITIVE: ** Results**    Positive Control = Positive  Negative Control = Negative      05-13    139  |  105  |  20  ----------------------------<  108<H>  4.3   |  21<L>  |  0.84    Ca    9.4      13 May 2019 19:45  Phos  4.0     05-13  Mg     2.1     05-13    TPro  7.1  /  Alb  3.93  /  TBili  0.2  /  DBili  x   /  AST  15  /  ALT  12  /  AlkPhos  86  05-13    LIVER FUNCTIONS - ( 13 May 2019 19:45 )  Alb: 3.93 g/dL / Pro: 7.1 g/dL / ALK PHOS: 86 u/L / ALT: 12 u/L / AST: 15 u/L / GGT: x           PT/INR - ( 13 May 2019 21:30 )   PT: 17.5 SEC;   INR: 1.52     PTT - ( 13 May 2019 21:30 )  PTT:34.9 SEC LABS and ADDITIONAL STUDIES:                        8.1    10.19 )-----------( 394      ( 13 May 2019 19:45 )             29.5     Occult Blood, Feces (05.13.19 @ 20:04)    Occult Blood: POSITIVE: ** Results**    Positive Control = Positive  Negative Control = Negative    05-13    139  |  105  |  20  ----------------------------<  108<H>  4.3   |  21<L>  |  0.84    Ca    9.4      13 May 2019 19:45  Phos  4.0     05-13  Mg     2.1     05-13    TPro  7.1  /  Alb  3.93  /  TBili  0.2  /  DBili  x   /  AST  15  /  ALT  12  /  AlkPhos  86  05-13    LIVER FUNCTIONS - ( 13 May 2019 19:45 )  Alb: 3.93 g/dL / Pro: 7.1 g/dL / ALK PHOS: 86 u/L / ALT: 12 u/L / AST: 15 u/L / GGT: x           PT/INR - ( 13 May 2019 21:30 )   PT: 17.5 SEC;   INR: 1.52     PTT - ( 13 May 2019 21:30 )  PTT:34.9 SEC LABS and ADDITIONAL STUDIES:                        8.1    10.19 )-----------( 394      ( 13 May 2019 19:45 )             29.5     Occult Blood, Feces (05.13.19 @ 20:04)    Occult Blood: POSITIVE: ** Results**    Positive Control = Positive  Negative Control = Negative    05-13    139  |  105  |  20  ----------------------------<  108<H>  4.3   |  21<L>  |  0.84    Ca    9.4      13 May 2019 19:45  Phos  4.0     05-13  Mg     2.1     05-13    TPro  7.1  /  Alb  3.93  /  TBili  0.2  /  DBili  x   /  AST  15  /  ALT  12  /  AlkPhos  86  05-13    LIVER FUNCTIONS - ( 13 May 2019 19:45 )  Alb: 3.93 g/dL / Pro: 7.1 g/dL / ALK PHOS: 86 u/L / ALT: 12 u/L / AST: 15 u/L / GGT: x           PT/INR - ( 13 May 2019 21:30 )   PT: 17.5 SEC;   INR: 1.52     PTT - ( 13 May 2019 21:30 )  PTT:34.9 SEC    < from: Xray Chest 2 Views PA/Lat (05.13.19 @ 22:25) >    IMPRESSION:       1. Increasing size and number of pulmonary metastases.  2. Left fifth rib metastasis.    < end of copied text >

## 2019-05-13 NOTE — H&P ADULT - PROBLEM SELECTOR PLAN 7
- On xarelto, holding it for now due to concern for active GI bleed, unable to place on SCDs 2/2 known L leg DVT without complete resolution on outpatient US, Will encourage ambulation, consider heparin gtt in AM if feasible as per GI/Primary Team

## 2019-05-13 NOTE — H&P ADULT - NSHPREVIEWOFSYSTEMS_GEN_ALL_CORE
REVIEW OF SYSTEMS:    CONSTITUTIONAL: +generalized weakness, +fatigue/malaise, no fevers/chills  EYES: No visual changes or eye discharge  ENT: No rhinorrhea or sore throat  NECK: No pain or stiffness  RESPIRATORY: No cough, wheezing, hemoptysis; No shortness of breath at rest, +ADKINS  CARDIOVASCULAR: No chest pain or palpitations; +L leg swelling 2/2 known L leg DVT  GASTROINTESTINAL: +Crampy abd pain on and off; +BRB mixed with stool, denies melena  BACK: No back pain  GENITOURINARY: No dysuria, frequency or hematuria  NEUROLOGICAL: No numbness or weakness  SKIN: No itching, burning, rashes, or lesions

## 2019-05-13 NOTE — H&P ADULT - PROBLEM SELECTOR PLAN 3
- On Xarelto, unfortunately will have to hold Xarelto for now due to possible active GI bleed but given her DVT/PE were within the past 3 months she would likely benefit from a heparin gtt while being evaluated for a possible GI bleed, would defer decision on when to start a potential heparin gtt to GI and primary team in AM  - Last Xarelto dose was taken this AM around 8 AM

## 2019-05-13 NOTE — ED ADULT NURSE REASSESSMENT NOTE - NS ED NURSE REASSESS COMMENT FT1
Pt received from AMADA Alaniz at approx 20:20. Pt A&Ox3, still endorses some SOB and weakness. O2 sat 94% on room air, respirations even and unlabored at this time. Pt understands plan of care at this time. Awaiting lab results. VS as noted. Pt appears in no acute or apparent distress at this time. Family at bedside. Will continue to monitor.

## 2019-05-13 NOTE — ED PROVIDER NOTE - CLINICAL SUMMARY MEDICAL DECISION MAKING FREE TEXT BOX
83 Y F on AC for PE also has filter in, active RCC metastatic presents with hg 7 on outpatient labs in setting of suspected UGI bleed. Will send occult, repeat labs, T&S likely admit with or without transfusion.

## 2019-05-13 NOTE — H&P ADULT - PROBLEM SELECTOR PLAN 4
- s/p resection and placement of ommaya reservoir  - States that her recent outpatient imaging showed stable findings  - c/w outpatient follow up

## 2019-05-13 NOTE — H&P ADULT - HISTORY OF PRESENT ILLNESS
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 Said that since she was started on her anticoagulation for her DVT/PE she has noted herself to be much more malaised/lethargic than usual and has experienced ADKINS with minimal exertion. Said that she went to her PCP for an evaluation of this initially in April and was told she had a low Hgb level and was sent to [facility] ED for an evaluation. There she was given 1U pRBC and was advised to be admitted but she refused and elected to follow up with GI as an outpatient. Said that she has not yet followed up with GI. Said that about 1 week ago she had a bout of constipation and had to strain to pass a BM, has started to note significant BRB mixed with stool though that has improved over the past 2 days, now is having minimal BRB mixed with stools. Said that she has noted her stool becoming darker in color but denies it being melena. Has some crampy abdominal pain on and off. Denies any significant nausea/vomiting. Her symptoms had continued and she went back to her PCP for a re-eval and there was noted to have a Hgb level around 6 and was sent to [facility] ED for a re-eval. Currently patient denies any significant complaints.     With regards to her L leg DVT, said that she still has swelling of her L leg but the swelling is improving slowly. Said that she had a recent US of the leg that showed a persistent DVT and was told she would need to be on anticoagulation until July at least.     On arrival to the ED, her vitals were T 98.7, P 99, /51, RR 16, O2 sat 98% RA. Her lab work showed a stable Hgb from her prior levels and her FOBT was positive. She was admitted to medicine. 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 Said that since she was started on her anticoagulation for her DVT/PE she has noted herself to be much more malaised/lethargic than usual and has experienced ADKINS with minimal exertion. Said that she went to her PCP for an evaluation of this initially in April and was told she had a low Hgb level (~5) and was sent to [facility] ED for an evaluation. There she was given 1U pRBC and was advised to be admitted but she refused and elected to follow up with GI as an outpatient. Said that she has not yet followed up with GI. Said that about 1 week ago she had a bout of constipation and had to strain to pass a BM, has started to note significant BRB mixed with stool though that has improved over the past 2 days, now is having minimal BRB mixed with stools. Said that she has noted her stool becoming darker in color but denies it being melena. Has some crampy abdominal pain on and off. Denies any significant nausea/vomiting. Her symptoms had continued and she went back to her PCP for a re-eval and there was noted to have a Hgb level around 6 and was sent to [facility] ED for a re-eval. Currently patient denies any significant complaints.     With regards to her L leg DVT, said that she still has swelling of her L leg but the swelling is improving slowly. Said that she had a recent US of the leg that showed a persistent DVT and was told she would need to be on anticoagulation until July at least.     On arrival to the ED, her vitals were T 98.7, P 99, /51, RR 16, O2 sat 98% RA. Her lab work showed a stable Hgb from her prior levels and her FOBT was positive. She was admitted to medicine.

## 2019-05-13 NOTE — ED PROVIDER NOTE - ATTENDING CONTRIBUTION TO CARE
DR. BLOCH, ATTENDING MD-  I performed a face to face bedside interview with patient regarding history of present illness, review of symptoms and past medical history. I completed an independent physical exam.  I have discussed patient's plan of care with the resident.   Patient examined, elderly woman, NAd HEENT nml lungs clear, abd soft nontender,extrem no edema,  Motor intact, sensation nml.

## 2019-05-13 NOTE — ED ADULT TRIAGE NOTE - CHIEF COMPLAINT QUOTE
Sent by MD for blood transfusion.  Denies bleeding.  +SOB.  Denies CP, dizziness, or lightheadedness.

## 2019-05-13 NOTE — H&P ADULT - NSHPSOCIALHISTORY_GEN_ALL_CORE
Lives alone  Ambulates with cane  Former smoker  Denies any significant EtOH use  Denies illicit substance use

## 2019-05-13 NOTE — H&P ADULT - ASSESSMENT
This is an 83F with history as above who presents to the hospital with complaints of symptomatic anemia.

## 2019-05-13 NOTE — ED ADULT NURSE NOTE - NSIMPLEMENTINTERV_GEN_ALL_ED
Implemented All Universal Safety Interventions:  [place] to call system. Call bell, personal items and telephone within reach. Instruct patient to call for assistance. Room bathroom lighting operational. Non-slip footwear when patient is off stretcher. Physically safe environment: no spills, clutter or unnecessary equipment. Stretcher in lowest position, wheels locked, appropriate side rails in place.

## 2019-05-13 NOTE — ED CLERICAL - CLERICAL COMMENTS
pt with metastatic renal ca with recurrent anemia sent c/o ADKINS. if adm necessary, pls adm to [place]

## 2019-05-14 DIAGNOSIS — Z29.9 ENCOUNTER FOR PROPHYLACTIC MEASURES, UNSPECIFIED: ICD-10-CM

## 2019-05-14 DIAGNOSIS — G93.9 DISORDER OF BRAIN, UNSPECIFIED: ICD-10-CM

## 2019-05-14 DIAGNOSIS — D64.9 ANEMIA, UNSPECIFIED: ICD-10-CM

## 2019-05-14 DIAGNOSIS — Z71.89 OTHER SPECIFIED COUNSELING: ICD-10-CM

## 2019-05-14 DIAGNOSIS — R06.09 OTHER FORMS OF DYSPNEA: ICD-10-CM

## 2019-05-14 DIAGNOSIS — E03.9 HYPOTHYROIDISM, UNSPECIFIED: ICD-10-CM

## 2019-05-14 DIAGNOSIS — I10 ESSENTIAL (PRIMARY) HYPERTENSION: ICD-10-CM

## 2019-05-14 DIAGNOSIS — I26.99 OTHER PULMONARY EMBOLISM WITHOUT ACUTE COR PULMONALE: ICD-10-CM

## 2019-05-14 DIAGNOSIS — K92.2 GASTROINTESTINAL HEMORRHAGE, UNSPECIFIED: ICD-10-CM

## 2019-05-14 LAB
ANION GAP SERPL CALC-SCNC: 8 MMO/L — SIGNIFICANT CHANGE UP (ref 7–14)
BUN SERPL-MCNC: 17 MG/DL — SIGNIFICANT CHANGE UP (ref 7–23)
CALCIUM SERPL-MCNC: 8.6 MG/DL — SIGNIFICANT CHANGE UP (ref 8.4–10.5)
CHLORIDE SERPL-SCNC: 106 MMOL/L — SIGNIFICANT CHANGE UP (ref 98–107)
CO2 SERPL-SCNC: 24 MMOL/L — SIGNIFICANT CHANGE UP (ref 22–31)
CREAT SERPL-MCNC: 0.83 MG/DL — SIGNIFICANT CHANGE UP (ref 0.5–1.3)
GLUCOSE SERPL-MCNC: 116 MG/DL — HIGH (ref 70–99)
HCT VFR BLD CALC: 24 % — LOW (ref 34.5–45)
HCT VFR BLD CALC: 24.3 % — LOW (ref 34.5–45)
HCT VFR BLD CALC: 29.7 % — LOW (ref 34.5–45)
HCT VFR BLD CALC: 30.4 % — LOW (ref 34.5–45)
HGB BLD-MCNC: 6.7 G/DL — CRITICAL LOW (ref 11.5–15.5)
HGB BLD-MCNC: 6.7 G/DL — CRITICAL LOW (ref 11.5–15.5)
HGB BLD-MCNC: 8.2 G/DL — LOW (ref 11.5–15.5)
HGB BLD-MCNC: 8.5 G/DL — LOW (ref 11.5–15.5)
MAGNESIUM SERPL-MCNC: 2 MG/DL — SIGNIFICANT CHANGE UP (ref 1.6–2.6)
MCHC RBC-ENTMCNC: 18.5 PG — LOW (ref 27–34)
MCHC RBC-ENTMCNC: 18.9 PG — LOW (ref 27–34)
MCHC RBC-ENTMCNC: 19.1 PG — LOW (ref 27–34)
MCHC RBC-ENTMCNC: 19.1 PG — LOW (ref 27–34)
MCHC RBC-ENTMCNC: 27.6 % — LOW (ref 32–36)
MCHC RBC-ENTMCNC: 27.6 % — LOW (ref 32–36)
MCHC RBC-ENTMCNC: 27.9 % — LOW (ref 32–36)
MCHC RBC-ENTMCNC: 28 % — LOW (ref 32–36)
MCV RBC AUTO: 66.9 FL — LOW (ref 80–100)
MCV RBC AUTO: 67.8 FL — LOW (ref 80–100)
MCV RBC AUTO: 68.2 FL — LOW (ref 80–100)
MCV RBC AUTO: 69.2 FL — LOW (ref 80–100)
NRBC # FLD: 0 K/UL — SIGNIFICANT CHANGE UP (ref 0–0)
NRBC # FLD: 0.02 K/UL — SIGNIFICANT CHANGE UP (ref 0–0)
PHOSPHATE SERPL-MCNC: 4.4 MG/DL — SIGNIFICANT CHANGE UP (ref 2.5–4.5)
PLATELET # BLD AUTO: 294 K/UL — SIGNIFICANT CHANGE UP (ref 150–400)
PLATELET # BLD AUTO: 307 K/UL — SIGNIFICANT CHANGE UP (ref 150–400)
PLATELET # BLD AUTO: 322 K/UL — SIGNIFICANT CHANGE UP (ref 150–400)
PLATELET # BLD AUTO: 328 K/UL — SIGNIFICANT CHANGE UP (ref 150–400)
PMV BLD: 10.1 FL — SIGNIFICANT CHANGE UP (ref 7–13)
PMV BLD: 9.6 FL — SIGNIFICANT CHANGE UP (ref 7–13)
PMV BLD: 9.6 FL — SIGNIFICANT CHANGE UP (ref 7–13)
PMV BLD: 9.9 FL — SIGNIFICANT CHANGE UP (ref 7–13)
POTASSIUM SERPL-MCNC: 4.3 MMOL/L — SIGNIFICANT CHANGE UP (ref 3.5–5.3)
POTASSIUM SERPL-SCNC: 4.3 MMOL/L — SIGNIFICANT CHANGE UP (ref 3.5–5.3)
RBC # BLD: 3.54 M/UL — LOW (ref 3.8–5.2)
RBC # BLD: 3.63 M/UL — LOW (ref 3.8–5.2)
RBC # BLD: 4.29 M/UL — SIGNIFICANT CHANGE UP (ref 3.8–5.2)
RBC # BLD: 4.46 M/UL — SIGNIFICANT CHANGE UP (ref 3.8–5.2)
RBC # FLD: 19.8 % — HIGH (ref 10.3–14.5)
RBC # FLD: 19.9 % — HIGH (ref 10.3–14.5)
RBC # FLD: 20.5 % — HIGH (ref 10.3–14.5)
RBC # FLD: 21 % — HIGH (ref 10.3–14.5)
SODIUM SERPL-SCNC: 138 MMOL/L — SIGNIFICANT CHANGE UP (ref 135–145)
WBC # BLD: 5.58 K/UL — SIGNIFICANT CHANGE UP (ref 3.8–10.5)
WBC # BLD: 5.62 K/UL — SIGNIFICANT CHANGE UP (ref 3.8–10.5)
WBC # BLD: 6.97 K/UL — SIGNIFICANT CHANGE UP (ref 3.8–10.5)
WBC # BLD: 7.09 K/UL — SIGNIFICANT CHANGE UP (ref 3.8–10.5)
WBC # FLD AUTO: 5.58 K/UL — SIGNIFICANT CHANGE UP (ref 3.8–10.5)
WBC # FLD AUTO: 5.62 K/UL — SIGNIFICANT CHANGE UP (ref 3.8–10.5)
WBC # FLD AUTO: 6.97 K/UL — SIGNIFICANT CHANGE UP (ref 3.8–10.5)
WBC # FLD AUTO: 7.09 K/UL — SIGNIFICANT CHANGE UP (ref 3.8–10.5)

## 2019-05-14 RX ORDER — HYDROCORTISONE 1 %
1 OINTMENT (GRAM) TOPICAL AT BEDTIME
Refills: 0 | Status: DISCONTINUED | OUTPATIENT
Start: 2019-05-14 | End: 2019-05-16

## 2019-05-14 RX ADMIN — PANTOPRAZOLE SODIUM 40 MILLIGRAM(S): 20 TABLET, DELAYED RELEASE ORAL at 05:43

## 2019-05-14 RX ADMIN — Medication 88 MICROGRAM(S): at 06:53

## 2019-05-14 RX ADMIN — Medication 1 SUPPOSITORY(S): at 22:27

## 2019-05-14 RX ADMIN — Medication 120 MILLIGRAM(S): at 05:43

## 2019-05-14 RX ADMIN — PANTOPRAZOLE SODIUM 40 MILLIGRAM(S): 20 TABLET, DELAYED RELEASE ORAL at 19:41

## 2019-05-14 NOTE — CONSULT NOTE ADULT - ASSESSMENT
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

## 2019-05-14 NOTE — CONSULT NOTE ADULT - SUBJECTIVE AND OBJECTIVE BOX
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 Said that since she was started on her anticoagulation for her DVT/PE she has noted herself to be much more malaised/lethargic than usual and has experienced ADKINS with minimal exertion. Said that she went to her PCP for an evaluation of this initially in April and was told she had a low Hgb level (~5) and was sent to [facility] ED for an evaluation. There she was given 1U pRBC and was advised to be admitted but she refused and elected to follow up with GI as an outpatient. Said that she has not yet followed up with GI. Said that about 1 week ago she had a bout of constipation and had to strain to pass a BM, has started to note significant BRB mixed with stool though that has improved over the past 2 days, now is having minimal BRB mixed with stools. Said that she has noted her stool becoming darker in color but denies it being melena. Has some crampy abdominal pain on and off. Denies any significant nausea/vomiting. Her symptoms had continued and she went back to her PCP for a re-eval and there was noted to have a Hgb level around 6 and was sent to [facility] ED for a re-eval. Currently patient denies any significant complaints. With regards to her L leg DVT, said that she still has swelling of her L leg but the swelling is improving slowly. Said that she had a recent US of the leg that showed a persistent DVT and was told she would need to be on anticoagulation until July at least. On arrival to the ED, her vitals were T 98.7, P 99, /51, RR 16, O2 sat 98% RA. Her lab work showed a stable Hgb from her prior levels and her FOBT was positive. She was admitted to medicine. GI consulted for rectal bleeding. The patient reports that she has h/o hemorrhoids and colonoscopy about 6 years ago that was only notable for hemorrhoids. She had an EGD by our group in 2017 with nonbleeding gastric ulcers. Since that time the patient has been started on Xarelto for PE/DVT diagnosed in 2019. She states that since being on Xarelto that she noted "a fair amount of red blood" in the toilet with her bowel movements and more recently she has been noticing darker colored stools as well now. She is feeling more fatigued than usual and notes that she has been bleeding with nearly every bowel movements that she has, red in the toilet and dark stools.       No Known Allergies      levothyroxine 88 MICROGram(s) Oral daily  pantoprazole  Injectable 40 milliGRAM(s) IV Push two times a day  verapamil  milliGRAM(s) Oral daily        FAMILY HISTORY:  No pertinent family history in first degree relatives        Review of Systems:    General:  No wt loss, fevers, chills, night sweats, fatigue  Eyes:  Good vision, no reported pain  ENT:  No sore throat, pain, runny nose, dysphagia  CV:  No pain, palpitations, no lightheadedness  Resp:  No dyspnea, cough, tachypnea, wheezing  GI: as above +rectal bleeding  :  No pain, bleeding, incontinence, nocturia  Muscle:  No pain, weakness  Neuro:  No weakness, tingling, memory problems  Psych:  No fatigue, insomnia, mood problems, depression  Endocrine:  No polyuria, polydypsia, cold/heat intolerance  Heme:  No petechiae, ecchymosis, easy bruisability  Skin:  No rash, tattoos, scars, edema    Relevant Family History:   n/c    Relevant Social History: n/c      Physical Exam:    Vital Signs:  Vital Signs Last 24 Hrs  T(C): 36.7 (14 May 2019 11:01), Max: 37.1 (13 May 2019 17:17)  T(F): 98 (14 May 2019 11:01), Max: 98.7 (13 May 2019 17:17)  HR: 75 (14 May 2019 11:01) (75 - 100)  BP: 106/43 (14 May 2019 11:01) (106/43 - 141/42)  BP(mean): --  RR: 17 (14 May 2019 11:01) (16 - 18)  SpO2: 96% (14 May 2019 11:01) (94% - 99%)  Daily Height in cm: 147.32 (13 May 2019 22:52)    Daily Weight in k.4 (13 May 2019 22:52)    General:  Appears stated age, well-groomed, nad  HEENT:  NC/AT,  conjunctivae clear and pink, no thyromegaly, nodules, adenopathy, no JVD  Chest:  Full & symmetric excursion, no increased effort, breath sounds clear  Cardiovascular:  Regular rhythm, S1, S2, no murmur/rub/S3/S4, no abdominal bruit, no edema  Abdomen:  Soft, non-tender, non-distended, normoactive bowel sounds,  no masses ,no hepatosplenomeagaly, no signs of chronic liver disease  Extremities:  no cyanosis,clubbing or edema  Skin:  No rash/erythema/ecchymoses/petechiae/wounds/abscess/warm/dry  Neuro/Psych:  A&Ox3  , no asterixis, no tremor, no encephalopathy    Laboratory:                            6.7    5.58  )-----------( 294      ( 14 May 2019 06:25 )             24.3     05-14    138  |  106  |  17  ----------------------------<  116<H>  4.3   |  24  |  0.83    Ca    8.6      14 May 2019 04:52  Phos  4.4     05-14  Mg     2.0     05-14    TPro  7.1  /  Alb  3.93  /  TBili  0.2  /  DBili  x   /  AST  15  /  ALT  12  /  AlkPhos  86  05-13    LIVER FUNCTIONS - ( 13 May 2019 19:45 )  Alb: 3.93 g/dL / Pro: 7.1 g/dL / ALK PHOS: 86 u/L / ALT: 12 u/L / AST: 15 u/L / GGT: x           PT/INR - ( 13 May 2019 21:30 )   PT: 17.5 SEC;   INR: 1.52          PTT - ( 13 May 2019 21:30 )  PTT:34.9 SEC      Imaging:    < from: Upper Endoscopy (10.19.17 @ 12:26) >    [facility]  _______________________________________________________________________________  Patient Name: Yael Felix        Procedure Date: 10/19/2017 12:26 PM  MRN: [id number]                     Account Number: [id number]  YOB: 1935             Admit Type: Inpatient  Room: [place] [address]                         Gender: Female  Attending MD: HIEU DENTON DO        _______________________________________________________________________________     Procedure:           Upper GI endoscopy  Indications:         Melena  Providers:           HIEU DENTON DO  Medicines:           Monitored Anesthesia Care  Complications:       No immediate complications.  Procedure:           After obtaining informed consent, the endoscope was                        passed under direct vision. Throughout the procedure,                        the patient's blood pressure, pulse, and oxygen                        saturations were monitored continuously. The Endoscope                        was introduced through the mouth, and advanced to the                        second part of duodenum. The upper GI endoscopy was                        accomplished without difficulty. The patient tolerated    the procedure well.                                                                                   Findings:       The examined esophagus was moderately tortuous.       A medium-sized hiatus hernia was present.       Few non-bleeding cratered gastric ulcers with no stigmata of bleeding        were found in the gastric body.       One non-bleeding cratered gastric ulcer with no stigmata of bleeding was        found in the gastric antrum. Biopsies were taken with a cold forceps for        histology. Estimated blood loss: none.       The examined duodenum was normal.                                                                                   Impression:          - Tortuous esophagus.                       - Medium-sized hiatus hernia.                       - Non-bleeding gastric ulcers with no stigmata of                        bleeding.                       - Non-bleeding gastric ulcer with no stigmata of                        bleeding. Biopsied.                       - Normal examined duodenum.  Recommendation:      - Return patient to hospital cid for ongoing care.                       - Resume regular diet.                       - Use Protonix (pantoprazole) 40 mg PO BID.                       - Await pathology results.                       - Repeat the upper endoscopy in 2 months to check                        healing.                                                                                   Attending Participation:       I personally performed the entireprocedure.                                                                                     _________________  HIEU DENTON DO  10/19/2017 1:49:41 PM  This report has been signed electronically.  Number of Addenda: 0    Note Initiated On: 10/19/2017 12:26 PM    < end of copied text >

## 2019-05-14 NOTE — CONSULT NOTE ADULT - PROBLEM SELECTOR RECOMMENDATION 3
- Advanced care planning was discussed with patient and family.  Advanced care planning forms were reviewed and discussed.  Risks, benefits and alternatives of gastroenterologic procedures were discussed in detail and all questions were answered.    30 minutes spent.

## 2019-05-14 NOTE — PROGRESS NOTE ADULT - PROBLEM SELECTOR PLAN 2
- c/o ADKINS, possibly 2/2 symptomatic anemia but might be multifactorial given recent PE and deconditioning from recent surgery and CXR with innumerable mets (likely 2/2 RCC)  - Offered onc eval for further management of her pulmonary metastatic disease, unsure if patient is a candidate for any therapy of the pulm mets (likely 2/2 RCC)  Pt refused onc eval. States that her quality of life is more improtant and she would not consider any chemo therapy. She will follow up with her onc outpt.

## 2019-05-14 NOTE — PROVIDER CONTACT NOTE (CRITICAL VALUE NOTIFICATION) - RECOMMENDATIONS
provider to assess patient at bedside and consent patient for blood transfusion. transfuse as appropriate.

## 2019-05-14 NOTE — CONSULT NOTE ADULT - PROBLEM SELECTOR RECOMMENDATION 2
- diagnosed 2/2019; taking Xarelto and s/p IVC filter as well   - holding a/c at this time in setting of gi bleeding  - further recommendations pending outcome of endoscopic eval

## 2019-05-14 NOTE — PROGRESS NOTE ADULT - ATTENDING COMMENTS
Demar Simmons will be covering for me starting 5/15/19. He can be reached at  if needed.     - Dr. LEE ANN Phoenix ([facility])  - [phone]

## 2019-05-14 NOTE — PROVIDER CONTACT NOTE (CRITICAL VALUE NOTIFICATION) - SITUATION
patient admitted with GI bleed, monitoring CBC. Hgb 6.1 this morning. patient admitted with GI bleed, monitoring CBC. Hgb 6.7 this morning.

## 2019-05-14 NOTE — PROVIDER CONTACT NOTE (CRITICAL VALUE NOTIFICATION) - ASSESSMENT
appears comfortable sleeping between care, VS stable, no complaints.
no complaints, appears comfortable sleeping.
82

## 2019-05-14 NOTE — CONSULT NOTE ADULT - PROBLEM SELECTOR RECOMMENDATION 9
- suspect in setting of Xarelto; pt with h/o gastric ulcers on egd 2017; r/o UGIB  - trend cbc closely  - transfuse PRBC x 1 unit today   - Protonix 40mg IVP BID  - ansuol suppository qhs for hemorrhoids  - cont to monitor stools for further episodes of gi bleeding   - hold a/c at this time  - NPO for EGD this afternoon; ideally will also need colonoscopy this admission - suspect in setting of Xarelto; pt with h/o gastric ulcers on egd 2017; r/o UGIB  - trend cbc closely  - transfuse PRBC x 1 unit today   - Protonix 40mg IVP BID  - ansuol suppository qhs for hemorrhoids  - cont to monitor stools for further episodes of gi bleeding   - hold a/c at this time  - NPO for EGD this afternoon

## 2019-05-15 LAB
HCT VFR BLD CALC: 28.3 % — LOW (ref 34.5–45)
HGB BLD-MCNC: 8 G/DL — LOW (ref 11.5–15.5)
MCHC RBC-ENTMCNC: 19.2 PG — LOW (ref 27–34)
MCHC RBC-ENTMCNC: 28.3 % — LOW (ref 32–36)
MCV RBC AUTO: 68 FL — LOW (ref 80–100)
NRBC # FLD: 0 K/UL — SIGNIFICANT CHANGE UP (ref 0–0)
PLATELET # BLD AUTO: 298 K/UL — SIGNIFICANT CHANGE UP (ref 150–400)
PMV BLD: 9.6 FL — SIGNIFICANT CHANGE UP (ref 7–13)
RBC # BLD: 4.16 M/UL — SIGNIFICANT CHANGE UP (ref 3.8–5.2)
RBC # FLD: 20.3 % — HIGH (ref 10.3–14.5)
WBC # BLD: 6.35 K/UL — SIGNIFICANT CHANGE UP (ref 3.8–10.5)
WBC # FLD AUTO: 6.35 K/UL — SIGNIFICANT CHANGE UP (ref 3.8–10.5)

## 2019-05-15 RX ORDER — SIMETHICONE 80 MG/1
80 TABLET, CHEWABLE ORAL ONCE
Refills: 0 | Status: COMPLETED | OUTPATIENT
Start: 2019-05-15 | End: 2019-05-15

## 2019-05-15 RX ADMIN — Medication 1 SUPPOSITORY(S): at 21:40

## 2019-05-15 RX ADMIN — PANTOPRAZOLE SODIUM 40 MILLIGRAM(S): 20 TABLET, DELAYED RELEASE ORAL at 17:08

## 2019-05-15 RX ADMIN — SIMETHICONE 80 MILLIGRAM(S): 80 TABLET, CHEWABLE ORAL at 19:45

## 2019-05-15 RX ADMIN — PANTOPRAZOLE SODIUM 40 MILLIGRAM(S): 20 TABLET, DELAYED RELEASE ORAL at 05:40

## 2019-05-15 RX ADMIN — Medication 88 MICROGRAM(S): at 05:41

## 2019-05-15 RX ADMIN — Medication 120 MILLIGRAM(S): at 05:40

## 2019-05-15 NOTE — PROGRESS NOTE ADULT - PROBLEM SELECTOR PLAN 2
- diagnosed 2/2019; taking Xarelto and s/p IVC filter as well   - continue to hold a/c; pt has follow up appointment with pulmonologist, Dr. Raffy Mcgee on 5/20   to reassess need to continue - diagnosed 2/2019; takes xarelto as outpatient and has IVC filter   - recommend to continue to hold a/c; pt has follow up appointment with pulmonologist, Dr. Raffy Mcgee on 5/20 to reassess

## 2019-05-15 NOTE — PROGRESS NOTE ADULT - PROBLEM SELECTOR PLAN 2
- c/o DAKINS, possibly 2/2 symptomatic anemia but might be multifactorial given recent PE and deconditioning from recent surgery and CXR with innumerable mets (likely 2/2 RCC)  - Offered onc eval for further management of her pulmonary metastatic disease, unsure if patient is a candidate for any therapy of the pulm mets (likely 2/2 RCC)  Pt refused onc eval. States that her quality of life is more important and she would not consider any chemo therapy. She will follow up with her onc outpt.

## 2019-05-16 ENCOUNTER — TRANSCRIPTION ENCOUNTER (OUTPATIENT)
Age: 84
End: 2019-05-16

## 2019-05-16 VITALS
DIASTOLIC BLOOD PRESSURE: 43 MMHG | OXYGEN SATURATION: 99 % | TEMPERATURE: 98 F | SYSTOLIC BLOOD PRESSURE: 114 MMHG | HEART RATE: 78 BPM | RESPIRATION RATE: 16 BRPM

## 2019-05-16 LAB
HCT VFR BLD CALC: 28.9 % — LOW (ref 34.5–45)
HGB BLD-MCNC: 8.3 G/DL — LOW (ref 11.5–15.5)
MCHC RBC-ENTMCNC: 19.3 PG — LOW (ref 27–34)
MCHC RBC-ENTMCNC: 28.7 % — LOW (ref 32–36)
MCV RBC AUTO: 67.4 FL — LOW (ref 80–100)
NRBC # FLD: 0 K/UL — SIGNIFICANT CHANGE UP (ref 0–0)
PLATELET # BLD AUTO: 307 K/UL — SIGNIFICANT CHANGE UP (ref 150–400)
PMV BLD: 9.9 FL — SIGNIFICANT CHANGE UP (ref 7–13)
RBC # BLD: 4.29 M/UL — SIGNIFICANT CHANGE UP (ref 3.8–5.2)
RBC # FLD: 21 % — HIGH (ref 10.3–14.5)
WBC # BLD: 5.77 K/UL — SIGNIFICANT CHANGE UP (ref 3.8–10.5)
WBC # FLD AUTO: 5.77 K/UL — SIGNIFICANT CHANGE UP (ref 3.8–10.5)

## 2019-05-16 RX ORDER — HYDROCORTISONE 1 %
1 OINTMENT (GRAM) TOPICAL
Qty: 4 | Refills: 0
Start: 2019-05-16 | End: 2019-05-19

## 2019-05-16 RX ORDER — RIVAROXABAN 15 MG-20MG
1 KIT ORAL
Qty: 0 | Refills: 0 | DISCHARGE

## 2019-05-16 RX ADMIN — PANTOPRAZOLE SODIUM 40 MILLIGRAM(S): 20 TABLET, DELAYED RELEASE ORAL at 05:13

## 2019-05-16 RX ADMIN — Medication 120 MILLIGRAM(S): at 05:13

## 2019-05-16 RX ADMIN — Medication 88 MICROGRAM(S): at 05:13

## 2019-05-16 NOTE — PROGRESS NOTE ADULT - ASSESSMENT
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
Normal rate, regular rhythm, normal S1, S2 heart sounds heard.

## 2019-05-16 NOTE — PROGRESS NOTE ADULT - PROBLEM SELECTOR PROBLEM 3
ACP (advance care planning)
ACP (advance care planning)
Other pulmonary embolism without acute cor pulmonale, unspecified chronicity
Other pulmonary embolism without acute cor pulmonale, unspecified chronicity

## 2019-05-16 NOTE — DISCHARGE NOTE PROVIDER - NSDCCPCAREPLAN_GEN_ALL_CORE_FT
PRINCIPAL DISCHARGE DIAGNOSIS  Diagnosis: Anemia  Assessment and Plan of Treatment: You were transfused with red blood cells and your hemoglobin and hematocrit are stable-  Make sure to follow up at out pt with your doctor.      SECONDARY DISCHARGE DIAGNOSES  Diagnosis: GI bleed  Assessment and Plan of Treatment: Make sure to follow up with your doctor to decide if to restart taking blood thinners-  Report any new signs of bleeding, such as bloody stool, dizziness, lightheadeded, palpitation, chest pain or shortness of breath, abdominal pain. PRINCIPAL DISCHARGE DIAGNOSIS  Diagnosis: Anemia  Assessment and Plan of Treatment: You were transfused with red blood cells and your hemoglobin 8.3 and hematocrit 28.9 are stable-  Make sure to follow up at out pt with your doctor.      SECONDARY DISCHARGE DIAGNOSES  Diagnosis: GI bleed  Assessment and Plan of Treatment: Make sure to follow up with your doctor to decide if to restart taking blood thinners-  Report any new signs of bleeding, such as bloody stool, dizziness, lightheadeded, palpitation, chest pain or shortness of breath, abdominal pain.

## 2019-05-16 NOTE — PROGRESS NOTE ADULT - PROBLEM SELECTOR PLAN 2
- diagnosed 2/2019; takes xarelto as outpatient and has IVC filter   - recommend to continue to hold a/c; pt has follow up appointment with pulmonologist, Dr. Raffy Mcgee on 5/20 to reassess

## 2019-05-16 NOTE — CHART NOTE - NSCHARTNOTEFT_GEN_A_CORE
Pt has been evaluated by PT for d/c planning-  Pt is found to be at baseline.  Pt will drive self home-

## 2019-05-16 NOTE — DISCHARGE NOTE PROVIDER - HOSPITAL COURSE
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          Problem/Plan - 1:    ·  Problem: Symptomatic anemia.  Plan: - Patient with c/o symptomatic anemia (ADKINS, fatigue/malaise) and some BRBPR (pt states that she does have a history of hemorrhoids also) and dark stools, FOBT here positive    - Concern for slow, ongoing GI bleed at present, suspect upper GI locale most likely but cannot r/o lower GI locale also given c/o BRB (though this might be 2/2 hemorrhoids)     - c/w PPI 40mg BID IVP    - GI eval appreciated. No overt bleed on EGD 5/14/19    - On anticoagulation for her recent DVT/PE, currently on Xarelto daily, will hold off on further Xarelto dosing for now, appreciate ongoing discussion with GI and pulmonary regarding benefit/risk profile of NOAC resumption    monitor hgb.          Problem/Plan - 2:    ·  Problem: Dyspnea on exertion.  Plan: - c/o ADKINS, possibly 2/2 symptomatic anemia but might be multifactorial given recent PE and deconditioning from recent surgery and CXR with    innumerable mets (likely 2/2 RCC)    - Offered onc eval for further management of her pulmonary metastatic disease, unsure if patient is a candidate for any therapy of the pulm mets (likely 2/2 RCC)    Pt refused onc eval. States that her quality of life is more important and she would not consider any chemo therapy. She will follow up with her onc outpt.          Problem/Plan - 3:    ·  Problem: Other pulmonary embolism without acute cor pulmonale, unspecified chronicity.  Plan: - On Xarelto, unfortunately will have to hold Xarelto for now due to possible active GI bleed but given her DVT/PE were within the past 3 months she would likely-Will consider starting heparin gtt after GI work up.              Problem/Plan - 4:    ·  Problem: Mass of brain.  Plan: - s/p resection and placement of ommaya reservoir    - States that her recent outpatient imaging showed stable findings    - c/w outpatient follow up.          Problem/Plan - 5:    ·  Problem: Essential (primary) hypertension.  Plan: - c/w home verapamil.          Problem/Plan - 6:    Problem: Hypothyroidism, unspecified type. Plan: - c/w home synthroid.         Problem/Plan - 7:    ·  Problem: Need for prophylactic measure.  Plan: full AC on hold due to GI bleed.

## 2019-05-16 NOTE — DISCHARGE NOTE NURSING/CASE MANAGEMENT/SOCIAL WORK - NSDCDPATPORTLINK_GEN_ALL_CORE
You can access the [facility], offered by [facility], by registering with the following website: [email or web address]

## 2019-05-16 NOTE — DISCHARGE NOTE PROVIDER - CARE PROVIDER_API CALL
Sylvester Mcgee (MD)  Critical Care Medicine  [address]  [place] [address]  Phone: [phone]  Fax: [phone]  Follow Up Time:

## 2019-05-16 NOTE — DISCHARGE NOTE PROVIDER - NSDCFUADDINST_GEN_ALL_CORE_FT
Return to the Hospital or go to your nearest Emergency Department for any worsening symptoms or concerns, bloody stool, vomiting bleed, syncope or passing out,  chest pain, shortness of breath.  Please follow up with your own private physician as schedule or our medical clinic at [phone] with in one week.

## 2019-05-16 NOTE — PROGRESS NOTE ADULT - PROBLEM SELECTOR PLAN 3
- Advanced care planning was discussed with patient and family.  Advanced care planning forms were reviewed and discussed.  Risks, benefits and alternatives of gastroenterologic procedures were discussed in detail and all questions were answered.  30 minutes spent.
- Advanced care planning was discussed with patient and family.  Advanced care planning forms were reviewed and discussed.  Risks, benefits and alternatives of gastroenterologic procedures were discussed in detail and all questions were answered.  30 minutes spent.
- On Xarelto, unfortunately will have to hold Xarelto for now due to possible active GI bleed but given her DVT/PE were within the past 3 months she would likely Will consider starting heparin gtt after GI work up.   - Last Xarelto dose was taken this AM around 8 AM
- On Xarelto, unfortunately will have to hold Xarelto for now due to possible active GI bleed but given her DVT/PE were within the past 3 months she would likely Will consider starting heparin gtt after GI work up.   - Last Xarelto dose was taken this AM around 8 AM

## 2019-05-16 NOTE — PROGRESS NOTE ADULT - SUBJECTIVE AND OBJECTIVE BOX
ANESTHESIA POSTOP NOTE  83y Female POSTOP DAY 1    Vital Signs Last 24 Hrs  T(C): 36.7 (15 May 2019 05:19), Max: 36.9 (14 May 2019 12:30)  T(F): 98 (15 May 2019 05:19), Max: 98.4 (14 May 2019 12:30)  HR: 92 (15 May 2019 05:19) (80 - 92)  BP: 123/56 (15 May 2019 05:19) (106/48 - 123/56)  BP(mean): --  RR: 18 (15 May 2019 05:19) (18 - 18)  SpO2: 94% (15 May 2019 05:19) (94% - 97%)  I&O's Summary    15 May 2019 07:01  -  15 May 2019 12:03  --------------------------------------------------------  IN: 240 mL / OUT: 0 mL / NET: 240 mL        [ X ] NO APPARENT ANESTHESIA COMPLICATIONS      Comments:
Feels well  No acute SOB  REceived one unit pRBC yesterday  She is aware of her pulm mets  She has already decided against further systemic Rx indefinitely  She continues to be monitored off NOAC    Vital Signs Last 24 Hrs  T(C): 36.8 (15 May 2019 12:12), Max: 36.9 (14 May 2019 12:30)  T(F): 98.3 (15 May 2019 12:12), Max: 98.4 (14 May 2019 12:30)  HR: 81 (15 May 2019 12:12) (80 - 92)  BP: 129/50 (15 May 2019 12:12) (106/48 - 129/50)  BP(mean): --  RR: 16 (15 May 2019 12:12) (16 - 18)  SpO2: 96% (15 May 2019 12:12) (94% - 97%)    GENERAL: NAD, Comfortable  HEAD:  Atraumatic, Normocephalic  EYES: EOMI, PERRLA, conjunctiva and sclera clear  NECK: Supple, No JVD  CHEST/LUNG: Clear to auscultation bilaterally; No wheeze  HEART: Regular rate and rhythm; No murmurs, rubs, or gallops  ABDOMEN: Soft, Nontender, Nondistended; Bowel sounds present  Neuro: AAO x 3, no focal deficit, 5/5 b/l extremities  EXTREMITIES:  2+ Peripheral Pulses, No clubbing, cyanosis. Trace edema  SKIN: No rashes or lesions    LABS:                        8.0    6.35  )-----------( 298      ( 15 May 2019 06:00 )             28.3     05-14    138  |  106  |  17  ----------------------------<  116<H>  4.3   |  24  |  0.83    Ca    8.6      14 May 2019 04:52  Phos  4.4     05-14  Mg     2.0     05-14    TPro  7.1  /  Alb  3.93  /  TBili  0.2  /  DBili  x   /  AST  15  /  ALT  12  /  AlkPhos  86  05-13    PT/INR - ( 13 May 2019 21:30 )   PT: 17.5 SEC;   INR: 1.52          PTT - ( 13 May 2019 21:30 )  PTT:34.9 SEC  CAPILLARY BLOOD GLUCOSE      POCT Blood Glucose.: 97 mg/dL (15 May 2019 11:59)  POCT Blood Glucose.: 115 mg/dL (14 May 2019 22:51)
INTERVAL HPI/OVERNIGHT EVENTS:    no further bloody stools  denies n/v/d/c, abdominal pain, melena or brbpr     MEDICATIONS  (STANDING):  hydrocortisone hemorrhoidal Suppository 1 Suppository(s) Rectal at bedtime  levothyroxine 88 MICROGram(s) Oral daily  pantoprazole  Injectable 40 milliGRAM(s) IV Push two times a day  verapamil  milliGRAM(s) Oral daily    MEDICATIONS  (PRN):      Allergies    No Known Allergies    Intolerances        Review of Systems:    General:  No wt loss, fevers, chills, night sweats, fatigue   Eyes:  Good vision, no reported pain  ENT:  No sore throat, pain, runny nose, dysphagia  CV:  No pain, palpitations, hypo/hypertension  Resp:  No dyspnea, cough, tachypnea, wheezing  GI:  No pain, No nausea, No vomiting, No diarrhea, No constipation, No weight loss, No fever, No pruritis, No rectal bleeding, No melena, No dysphagia  :  No pain, bleeding, incontinence, nocturia  Muscle:  No pain, weakness  Neuro:  No weakness, tingling, memory problems  Psych:  No fatigue, insomnia, mood problems, depression  Endocrine:  No polyuria, polydypsia, cold/heat intolerance  Heme:  No petechiae, ecchymosis, easy bruisability  Skin:  No rash, tattoos, scars, edema      Vital Signs Last 24 Hrs  T(C): 36.6 (16 May 2019 09:08), Max: 36.8 (15 May 2019 12:12)  T(F): 97.8 (16 May 2019 09:08), Max: 98.3 (15 May 2019 12:12)  HR: 78 (16 May 2019 09:08) (78 - 89)  BP: 114/43 (16 May 2019 09:08) (111/48 - 134/84)  BP(mean): --  RR: 16 (16 May 2019 09:08) (16 - 17)  SpO2: 99% (16 May 2019 09:08) (94% - 99%)    PHYSICAL EXAM:    Constitutional: NAD  HEENT: EOMI, throat clear  Neck: No LAD, supple  Respiratory: CTA and P  Cardiovascular: S1 and S2, RRR, no M  Gastrointestinal: BS+, soft, NT/ND, neg HSM,  Extremities: No peripheral edema, neg clubbing, cyanosis  Vascular: 2+ peripheral pulses  Neurological: A/O x 3, no focal deficits  Psychiatric: Normal mood, normal affect  Skin: No rashes      LABS:                        8.3    5.77  )-----------( 307      ( 16 May 2019 06:36 )             28.9                 RADIOLOGY & ADDITIONAL TESTS:
Patient is a 83y old  Female who presents with a chief complaint of Symptomatic Anemia (14 May 2019 11:18)      SUBJECTIVE / OVERNIGHT EVENTS:  feels okay.  no recent melena.  was constipated before and had bleeding while straining.   hgb 6.9. consistent with outpt read of 7.   seen by GI, plan for EGD.  no cp, no sob, no n/v/d. no abdominal pain.  no headache, no dizziness.   offered onc eval but refused to see onc.  States "I am better off without chemo meds". Seeing Dr. Navarrete (onc)      Vital Signs Last 24 Hrs  T(C): 36.7 (14 May 2019 11:01), Max: 37.1 (13 May 2019 17:17)  T(F): 98 (14 May 2019 11:01), Max: 98.7 (13 May 2019 17:17)  HR: 75 (14 May 2019 11:01) (75 - 100)  BP: 106/43 (14 May 2019 11:01) (106/43 - 141/42)  BP(mean): --  RR: 17 (14 May 2019 11:01) (16 - 18)  SpO2: 96% (14 May 2019 11:01) (94% - 99%)  I&O's Summary      PHYSICAL EXAM:  GENERAL: NAD, Comfortable  HEAD:  Atraumatic, Normocephalic  EYES: EOMI, PERRLA, conjunctiva and sclera clear  NECK: Supple, No JVD  CHEST/LUNG: Clear to auscultation bilaterally; No wheeze  HEART: Regular rate and rhythm; No murmurs, rubs, or gallops  ABDOMEN: Soft, Nontender, Nondistended; Bowel sounds present  Neuro: AAO x 3, no focal deficit, 5/5 b/l extremities  EXTREMITIES:  2+ Peripheral Pulses, No clubbing, cyanosis. Trace edema  SKIN: No rashes or lesions    LABS:                        6.7    5.58  )-----------( 294      ( 14 May 2019 06:25 )             24.3     05-14    138  |  106  |  17  ----------------------------<  116<H>  4.3   |  24  |  0.83    Ca    8.6      14 May 2019 04:52  Phos  4.4     05-14  Mg     2.0     05-14    TPro  7.1  /  Alb  3.93  /  TBili  0.2  /  DBili  x   /  AST  15  /  ALT  12  /  AlkPhos  86  05-13    PT/INR - ( 13 May 2019 21:30 )   PT: 17.5 SEC;   INR: 1.52          PTT - ( 13 May 2019 21:30 )  PTT:34.9 SEC  CAPILLARY BLOOD GLUCOSE                RADIOLOGY & ADDITIONAL TESTS:    Imaging Personally Reviewed:  [x] YES  [ ] NO    Consultant(s) Notes Reviewed:  [x] YES  [ ] NO      MEDICATIONS  (STANDING):  levothyroxine 88 MICROGram(s) Oral daily  pantoprazole  Injectable 40 milliGRAM(s) IV Push two times a day  verapamil  milliGRAM(s) Oral daily    MEDICATIONS  (PRN):      Care Discussed with Consultants/Other Providers [x] YES  [ ] NO    HEALTH ISSUES - PROBLEM Dx:  ACP (advance care planning): ACP (advance care planning)  Pulmonary embolism: Pulmonary embolism  GI bleed: GI bleed  Need for prophylactic measure: Need for prophylactic measure  Hypothyroidism, unspecified type: Hypothyroidism, unspecified type  Essential (primary) hypertension: Essential (primary) hypertension  Mass of brain: Mass of brain  Other pulmonary embolism without acute cor pulmonale, unspecified chronicity: Other pulmonary embolism without acute cor pulmonale, unspecified chronicity  Dyspnea on exertion: Dyspnea on exertion  Symptomatic anemia: Symptomatic anemia
INTERVAL HPI/OVERNIGHT EVENTS:    (+) brown stool   no abd pain   no n/v  tolerating po intake    MEDICATIONS  (STANDING):  hydrocortisone hemorrhoidal Suppository 1 Suppository(s) Rectal at bedtime  levothyroxine 88 MICROGram(s) Oral daily  pantoprazole  Injectable 40 milliGRAM(s) IV Push two times a day  verapamil  milliGRAM(s) Oral daily    MEDICATIONS  (PRN):      Allergies    No Known Allergies    Intolerances        Review of Systems:    General:  No wt loss, fevers, chills, night sweats, fatigue   Eyes:  Good vision, no reported pain  ENT:  No sore throat, pain, runny nose, dysphagia  CV:  No pain, palpitations, hypo/hypertension  Resp:  No dyspnea, cough, tachypnea, wheezing  GI:  No pain, No nausea, No vomiting, No diarrhea, No constipation, No weight loss, No fever, No pruritis, No rectal bleeding, No melena, No dysphagia  :  No pain, bleeding, incontinence, nocturia  Muscle:  No pain, weakness  Neuro:  No weakness, tingling, memory problems  Psych:  No fatigue, insomnia, mood problems, depression  Endocrine:  No polyuria, polydypsia, cold/heat intolerance  Heme:  No petechiae, ecchymosis, easy bruisability  Skin:  No rash, tattoos, scars, edema      Vital Signs Last 24 Hrs  T(C): 36.8 (15 May 2019 12:12), Max: 36.9 (14 May 2019 12:30)  T(F): 98.3 (15 May 2019 12:12), Max: 98.4 (14 May 2019 12:30)  HR: 81 (15 May 2019 12:12) (80 - 92)  BP: 129/50 (15 May 2019 12:12) (106/48 - 129/50)  BP(mean): --  RR: 16 (15 May 2019 12:12) (16 - 18)  SpO2: 96% (15 May 2019 12:12) (94% - 97%)    PHYSICAL EXAM:    Constitutional: NAD  HEENT: EOMI, throat clear  Neck: No LAD, supple  Respiratory: CTA and P  Cardiovascular: S1 and S2, RRR, no M  Gastrointestinal: BS+, soft, NT/ND, neg HSM,  Extremities: No peripheral edema, neg clubbing, cyanosis  Vascular: 2+ peripheral pulses  Neurological: A/O x 3, no focal deficits  Psychiatric: Normal mood, normal affect  Skin: No rashes      LABS:                        8.0    6.35  )-----------( 298      ( 15 May 2019 06:00 )             28.3     05-14    138  |  106  |  17  ----------------------------<  116<H>  4.3   |  24  |  0.83    Ca    8.6      14 May 2019 04:52  Phos  4.4     05-14  Mg     2.0     05-14    TPro  7.1  /  Alb  3.93  /  TBili  0.2  /  DBili  x   /  AST  15  /  ALT  12  /  AlkPhos  86  05-13    PT/INR - ( 13 May 2019 21:30 )   PT: 17.5 SEC;   INR: 1.52          PTT - ( 13 May 2019 21:30 )  PTT:34.9 SEC      RADIOLOGY & ADDITIONAL TESTS:    < from: Upper Endoscopy (05.14.19 @ 17:01) >    [facility]  _______________________________________________________________________________  Patient Name: Yael Felix        Procedure Date: 5/14/2019 5:01 PM  MRN: [id number]                     Account Number: [id number]  YOB: 1935             Admit Type: Inpatient  Room: [place] [address]                         Gender: Female  Attending MD: HIEU DENTON DO        _______________________________________________________________________________     Procedure:           Upper GI endoscopy  Indications:         Hematochezia  Providers:           HIEU DENTON DO  Medicines:           Monitored Anesthesia Care  Complications:       No immediate complications.  Procedure:           After obtaining informed consent, the endoscope was                        passed under direct vision. Throughout the procedure,                        the patient's blood pressure, pulse, and oxygen                        saturations were monitored continuously. The Endoscope                        was introduced through the mouth, and advanced to the                        second part of duodenum. The upper GI endoscopy was                        accomplished without difficulty. The patient tolerated        the procedure well.                                                                                   Findings:       The examined esophagus was normal.       Mild inflammation was found in the gastric antrum.       The examined duodenum was normal.                                                                                   Impression:          - Normal esophagus.                       - Gastritis.                       - Normal examined duodenum.                       - No specimens collected.  Recommendation:      - Return patient to hospital cid for ongoing care.                       - Advance diet as tolerated.                       - Continue to hold anticoagulation                       - will defer colonoscopy and monitor patient off                        anticoagualtion                                                                                   Attending Participation:       I personally performed the entire procedure.                                           _________________  HIEU DENTON DO  5/14/2019 5:34:35 PM  This report has been signed electronically.  Number of Addenda: 0    Note Initiated On: 5/14/2019 5:01 PM    < end of copied text >

## 2019-05-16 NOTE — DISCHARGE NOTE PROVIDER - CONDITIONS AT DISCHARGE
Case has been discussed w/ Attending Dr. Eldridge directing pt's care and pt is medically cleared for discharge.

## 2019-05-21 NOTE — BRIEF OPERATIVE NOTE - ESTIMATED BLOOD LOSS
5 Implemented All Fall Risk Interventions:  [place] to call system. Call bell, personal items and telephone within reach. Instruct patient to call for assistance. Room bathroom lighting operational. Non-slip footwear when patient is off stretcher. Physically safe environment: no spills, clutter or unnecessary equipment. Stretcher in lowest position, wheels locked, appropriate side rails in place. Provide visual cue, wrist band, yellow gown, etc. Monitor gait and stability. Monitor for mental status changes and reorient to person, place, and time. Review medications for side effects contributing to fall risk. Reinforce activity limits and safety measures with patient and family.

## 2019-05-31 NOTE — PRE-ANESTHESIA EVALUATION ADULT - NSANTHTOTALSCORECAL_ENT_A_CORE
[facility] [address],   2nd [place] [address]  Phone: [phone]  Fax: [phone]              Ashley Montague  1953    Encounter Date: 5/31/2019    Supriya Kamara M.D.          PROGRESS NOTE:  Chief Complaint   Patient presents with   • Chest Pain     Subjective:   Ashley Montague is a 66 y.o. female referred to cardiology clinic for evaluation of chest pain.    Patient reports that about 10 years ago she underwent a cardiac work-up with Dr. Velazquez at [facility].  Based on her description it appears that she underwent a coronary angiogram that was reportedly normal.  At that time she had palpitations and she was instructed to perform vagal maneuvers should she have recurrent symptoms.    She reports being in his usual state of health till about 2 to 3 weeks ago when she started having palpitations that occur every 1-3 days without any clear triggers and spontaneously resolve in a few seconds to a minute. She reports associated dizziness and chills. No history of syncope.     In the past week she has had 3 episodes of substernal nonradiating chest tightness that usually occur when she is doing some type of activity either gardening or cooking and symptoms were associated with nausea, chills and fatigue and spontaneously resolved in a few minutes.  She denies any prior history of similar symptoms.  She went for a 20-minute walk today and did not have any symptoms with walking.    She is a retired .     Referring physician: Alex Mcghee MD    Past Medical History:   Diagnosis Date   • Allergic rhinitis    • ASTHMA    • Back pain    • Chronic fatigue    • Disc herniation     L4,L5   • GERD (gastroesophageal reflux disease)    • Osteoarthritis    • Perimenopausal    • Senile osteoporosis 10/9/2013     Past Surgical History:   Procedure Laterality Date   • SHOULDER ARTHROSCOPY  2007    right   • APPENDECTOMY   1970     Family History   Problem Relation Age of Onset   • Other Sister         hyperparathyroidism   • Osteoporosis Sister    • Osteoporosis Mother    • Other Maternal Grandmother         hip fracture     Social History     Social History   • Marital status:      Spouse name: N/A   • Number of children: N/A   • Years of education: N/A     Occupational History   • Not on file.     Social History Main Topics   • Smoking status: Never Smoker   • Smokeless tobacco: Never Used   • Alcohol use No   • Drug use: No   • Sexual activity: Not on file     Other Topics Concern   • Not on file     Social History Narrative   • No narrative on file     Allergies   Allergen Reactions   • Avelox [Moxifloxacin Hcl In Nacl]    • Pcn [Penicillins] Hives     Outpatient Encounter Prescriptions as of 5/31/2019   Medication Sig Dispense Refill   • fluticasone (FLONASE) 50 MCG/ACT nasal spray Spray 1 Spray in nose every day.     • tramadol (ULTRAM) 50 MG TABS Take 50 mg by mouth every four hours as needed.     • Cholecalciferol (VITAMIN D-3) 5000 UNITS TABS Take 1 Tab by mouth every day. 30 Tab 11   • Multiple Vitamins-Minerals (ALIVE WOMENS 50+ PO) Take  by mouth.       • Calcium-Phosphorus 600-280 MG TABS Take 600 mg by mouth 3 times a day. With meals 60 Tab    • [DISCONTINUED] Cetirizine HCl (ZYRTEC ALLERGY) 10 MG CAPS Take  by mouth 2 Times a Day. 30 Cap      No facility-administered encounter medications on file as of 5/31/2019.      Review of Systems   Constitutional: Negative for malaise/fatigue.   Respiratory: Negative for shortness of breath.    Cardiovascular: Positive for chest pain and palpitations. Negative for orthopnea, leg swelling and PND.   Gastrointestinal: Negative for abdominal pain.   Musculoskeletal: Negative for falls.   Neurological: Negative for dizziness and loss of consciousness.   Psychiatric/Behavioral: Negative for depression.   All other systems reviewed and are negative.       Objective:   /60  "(BP Location: Right arm, Patient Position: Sitting)   Pulse 70   Ht 1.676 m (5' 6\")   Wt 55.8 kg (123 lb)   SpO2 96%   BMI 19.85 kg/m²      Physical Exam   Constitutional: She is oriented to person, place, and time. She appears well-developed and well-nourished. No distress.   HENT:   Head: Normocephalic and atraumatic.   Eyes: Conjunctivae are normal. No scleral icterus.   Neck: Normal range of motion. Neck supple.   Cardiovascular: Normal rate, regular rhythm and normal heart sounds.  Exam reveals no gallop and no friction rub.    No murmur heard.  Pulmonary/Chest: Effort normal and breath sounds normal. No respiratory distress. She has no wheezes. She has no rales.   Abdominal: Soft. She exhibits no distension. There is no tenderness.   Musculoskeletal: She exhibits no edema.   Neurological: She is alert and oriented to person, place, and time.   Skin: Skin is warm and dry. She is not diaphoretic.   Psychiatric: She has a normal mood and affect. Her behavior is normal.   Nursing note and vitals reviewed.    EKG performed today was personally reviewed and per my interpretation shows sinus bradycardia 58 bpm.  Otherwise normal EKG.    Assessment:     1. Other chest pain  EKG   2. Chest pain, unspecified type  Basic Metabolic Panel    Treadmill Stress   3. Palpitations  CBC WITHOUT DIFFERENTIAL    FREE THYROXINE    TSH    Holter Monitor Study   4. Screening for cholesterol level  Lipid Profile       Medical Decision Making:  Today's Assessment / Status / Plan:     Palpitations: Patient reports having almost daily symptoms.  She will be referred for a 48-hour Holter monitor for further evaluation.  She does not use much caffeine or alcohol.    Chest discomfort: Mostly atypical in nature.  Patient exercises regularly without any cardiac symptoms.  She will be referred for an exercise treadmill test.  She is bradycardic at baseline and her symptoms could be secondary to chronotropic incompetence.  I advised " patient to start a baby aspirin every day for now.     Basic labs including CBC and thyroid testing were ordered today.     Return to clinic in 3 months or earlier if needed.    Thank you for allowing me to participate in the care of this patient. Please do not hesitate to contact me with any questions.    Supriya Kamara MD  Cardiologist  [facility]      PLEASE NOTE: This dictation was created using voice recognition software.         Cedric Clayton D.O.  [address] [place] [address] [address]  [facility] [address]  VIA Facsimile: [phone]                  2

## 2019-07-11 ENCOUNTER — INBOUND DOCUMENT (OUTPATIENT)
Age: 84
End: 2019-07-11

## 2019-07-16 ENCOUNTER — APPOINTMENT (OUTPATIENT)
Dept: VASCULAR SURGERY | Facility: CLINIC | Age: 84
End: 2019-07-16
Payer: MEDICARE

## 2019-07-16 VITALS
DIASTOLIC BLOOD PRESSURE: 68 MMHG | TEMPERATURE: 98.1 F | WEIGHT: 135 LBS | BODY MASS INDEX: 30.37 KG/M2 | HEART RATE: 96 BPM | SYSTOLIC BLOOD PRESSURE: 138 MMHG | HEIGHT: 56 IN

## 2019-07-16 DIAGNOSIS — I87.009 POSTTHROMBOTIC SYNDROME W/OUT COMPLICATIONS OF UNSPECIFIED EXTREMITY: ICD-10-CM

## 2019-07-16 DIAGNOSIS — I26.99 OTHER PULMONARY EMBOLISM W/OUT ACUTE COR PULMONALE: ICD-10-CM

## 2019-07-16 PROBLEM — G93.9 DISORDER OF BRAIN, UNSPECIFIED: Chronic | Status: ACTIVE | Noted: 2019-05-13

## 2019-07-16 PROCEDURE — 99213 OFFICE O/P EST LOW 20 MIN: CPT

## 2019-07-16 PROCEDURE — 93971 EXTREMITY STUDY: CPT | Mod: LT

## 2019-07-16 NOTE — HISTORY OF PRESENT ILLNESS
[FreeTextEntry1] : pt was seen in consulation from neurosurg service  dx w left cfv and gsv dvt and pulmonary embolus\par pt is on xarelto\par pt saw pulmonary recently\par pt has no le c/o at this time  [de-identified] : pt states that she is not compliant w comp stockings\par pt states no new le c/o \par pt is on xarelto \par

## 2019-07-16 NOTE — DATA REVIEWED
[FreeTextEntry1] : 4/30/2019 Venous Duplex RLE no acute dvt svt\par                                        LLE sig for subacute non occ dvt in distal fem v \par \par 7/16/2019 Venous duplex LLE no  dvt svt

## 2019-07-16 NOTE — ASSESSMENT
[Arterial/Venous Disease] : arterial/venous disease [Medication Management] : medication management [Other: _____] : [unfilled] [FreeTextEntry1] : Impression left leg dvt w slow resolution and pulmonary embolus\par \par Plan Med Conservative management leg elevation, knee high compression stockings 15-20mm Hg (rx offered to pt pt refuses), wt loss, diet control, exercise program, protective measures\par from vasc standpoint may d/c xarelto and start baby asa daily\par pt to f/u w pulmonary Dr valdez to receive PE f/u and  recommendations re anticoag rx\par ov 3-4mo to re eval\par \par Varicose veins are enlarged, twisted veins. Varicose veins are caused by increased blood pressure in the veins.  The blood moves towards the heart by 1-way valves in the veins. When the valves become weakened or damaged, blood can collect in the veins and pool in your lower legs (ankles). This causes the veins to become enlarged and incompetent with reflux. Sitting or standing for long periods can cause blood to pool in the leg veins, increasing the pressure within the veins. \par Risk factors for varicose veins or venous disease may include:  obesity, older age, standing or sitting for prolonged periods of time for several years, being female, pregnancy, taking oral contraceptive pills or hormone replacement, being inactive, and/or smoking. \par The most common symptoms of varicose veins are sensations in the legs, such as a heavy feeling, burning, and/or aching. However, each individual may experience symptoms differently.  Other symptoms may include:  color changes in the skin, sores on the legs, or rash.  Severe varicose veins or venous disease may eventually produce long-term mild swelling that can result in more serious skin and tissue problems, such as ulcers and non-healing sores.\par Varicose veins and venous disease are diagnosed by a complete medical history, physical examination, and diagnostic studies for varicose veins including duplex ultrasound and color-flow imaging.  \par Medical treatment for varicose veins and venous disease include:  compression stockings, sclerotherapy, endovenous ablation and/or surgical treatment with microphlebectomy.  \par \par \par \par \par

## 2019-07-16 NOTE — PHYSICAL EXAM
[Normal Breath Sounds] : Normal breath sounds [1+] : left 1+ [2+] : left 2+ [Ankle Swelling (On Exam)] : present [Ankle Swelling Bilaterally] : bilaterally  [Varicose Veins Of Lower Extremities] : bilaterally [] : bilaterally [Ankle Swelling On The Right] : mild [No HSM] : no hepatosplenomegaly [No Rash or Lesion] : No rash or lesion [Alert] : alert [Oriented to Person] : oriented to person [Oriented to Place] : oriented to place [Oriented to Time] : oriented to time [Calm] : calm [JVD] : no jugular venous distention  [Abdomen Masses] : No abdominal masses [Tender] : was nontender [Stool Sample Taken] : No stool obtained  on rectal exam [de-identified] : nad [de-identified] : wnl [FreeTextEntry1] : Moderate bilateral leg venous insufficiency \par w mild  bilateral leg stasis dermatitis \par and mild  bilateral leg edema \par Multiple  bilateral leg small varicose  veins and spider veins calf and shin \par no wounds/ulcers\par  [de-identified] : wnl [de-identified] : wnl [de-identified] : Dom Cranial nerves 2-12 dom grossly intact [de-identified] : cooperative

## 2019-07-24 ENCOUNTER — APPOINTMENT (OUTPATIENT)
Dept: MRI IMAGING | Facility: IMAGING CENTER | Age: 84
End: 2019-07-24
Payer: MEDICARE

## 2019-07-24 ENCOUNTER — APPOINTMENT (OUTPATIENT)
Dept: NEUROSURGERY | Facility: CLINIC | Age: 84
End: 2019-07-24
Payer: MEDICARE

## 2019-07-24 ENCOUNTER — OUTPATIENT (OUTPATIENT)
Dept: OUTPATIENT SERVICES | Facility: HOSPITAL | Age: 84
LOS: 1 days | End: 2019-07-24
Payer: MEDICARE

## 2019-07-24 DIAGNOSIS — Z90.5 ACQUIRED ABSENCE OF KIDNEY: Chronic | ICD-10-CM

## 2019-07-24 DIAGNOSIS — G93.9 DISORDER OF BRAIN, UNSPECIFIED: Chronic | ICD-10-CM

## 2019-07-24 DIAGNOSIS — C79.31 SECONDARY MALIGNANT NEOPLASM OF BRAIN: ICD-10-CM

## 2019-07-24 PROCEDURE — 99213 OFFICE O/P EST LOW 20 MIN: CPT

## 2019-07-24 PROCEDURE — 70553 MRI BRAIN STEM W/O & W/DYE: CPT | Mod: 26

## 2019-07-24 PROCEDURE — A9585: CPT

## 2019-07-24 PROCEDURE — 70553 MRI BRAIN STEM W/O & W/DYE: CPT

## 2019-08-22 NOTE — MEDICAL STUDENT PROGRESS NOTE(EDUCATION) - NS MD HP STUD SUPERVISOR COMMENTS FT
Educational note reviewed. I discussed the case with the medical student and agree with the findings and plan as documented above.
Class III - visualization of the soft palate and the base of the uvula

## 2019-09-03 NOTE — PRE-OP CHECKLIST - WARM FLUIDS/WARM BLANKETS
Patient's INR is 2.0 today.   Will open new encounter to address INR that resulted on 09/03/2019   no

## 2019-09-27 NOTE — ASU DISCHARGE PLAN (ADULT/PEDIATRIC). - NOTIFY
EXAM: Chest, 2 views.

 

HISTORY: Dyspnea on exertion. Smoking history.

 

COMPARISON: 8/10/2018

 

FINDINGS: 2 views of the chest are obtained. There is no infiltrate, 

pleural effusion or pneumothorax. The heart is normal in size. There is 

evidence of coronary artery bypass grafting. There is a small nodular 

opacity overlying the lateral left lower lobe, likely corresponding with a

nodule in this location demonstrated on the prior CT dated 8/10/2018.

 

IMPRESSION: 

1. No acute pulmonary finding.

2. Subcentimeter left lower lobe pulmonary nodule, not appreciably changed

compared to the prior CT when allowing for differences in imaging 

modality.

 

Electronically signed by: Margoth Vines MD (9/27/2019 4:46 PM) [facility] Persistent Nausea and Vomiting/Bleeding that does not stop/Swelling that continues/Pain not relieved by Medications/Numbness, color, or temperature change to extremity/Increased Irritability or Sluggishness/Inability to Tolerate Liquids or Foods/Fever greater than 101

## 2019-10-23 ENCOUNTER — FORM ENCOUNTER (OUTPATIENT)
Age: 84
End: 2019-10-23

## 2019-10-23 ENCOUNTER — APPOINTMENT (OUTPATIENT)
Dept: VASCULAR SURGERY | Facility: CLINIC | Age: 84
End: 2019-10-23

## 2019-10-24 ENCOUNTER — APPOINTMENT (OUTPATIENT)
Dept: MRI IMAGING | Facility: IMAGING CENTER | Age: 84
End: 2019-10-24
Payer: MEDICARE

## 2019-10-24 ENCOUNTER — OUTPATIENT (OUTPATIENT)
Dept: OUTPATIENT SERVICES | Facility: HOSPITAL | Age: 84
LOS: 1 days | End: 2019-10-24
Payer: MEDICARE

## 2019-10-24 ENCOUNTER — APPOINTMENT (OUTPATIENT)
Dept: NEUROSURGERY | Facility: CLINIC | Age: 84
End: 2019-10-24
Payer: MEDICARE

## 2019-10-24 VITALS
SYSTOLIC BLOOD PRESSURE: 128 MMHG | TEMPERATURE: 97.8 F | HEART RATE: 88 BPM | DIASTOLIC BLOOD PRESSURE: 52 MMHG | RESPIRATION RATE: 16 BRPM

## 2019-10-24 DIAGNOSIS — I87.2 VENOUS INSUFFICIENCY (CHRONIC) (PERIPHERAL): ICD-10-CM

## 2019-10-24 DIAGNOSIS — C79.31 SECONDARY MALIGNANT NEOPLASM OF BRAIN: ICD-10-CM

## 2019-10-24 DIAGNOSIS — R60.0 LOCALIZED EDEMA: ICD-10-CM

## 2019-10-24 DIAGNOSIS — G93.9 DISORDER OF BRAIN, UNSPECIFIED: Chronic | ICD-10-CM

## 2019-10-24 DIAGNOSIS — G93.6 CEREBRAL EDEMA: ICD-10-CM

## 2019-10-24 DIAGNOSIS — Z90.5 ACQUIRED ABSENCE OF KIDNEY: Chronic | ICD-10-CM

## 2019-10-24 PROCEDURE — A9585: CPT

## 2019-10-24 PROCEDURE — 70553 MRI BRAIN STEM W/O & W/DYE: CPT | Mod: 26

## 2019-10-24 PROCEDURE — 99213 OFFICE O/P EST LOW 20 MIN: CPT

## 2019-10-24 PROCEDURE — 70553 MRI BRAIN STEM W/O & W/DYE: CPT

## 2019-10-24 RX ORDER — VERAPAMIL HYDROCHLORIDE 180 MG/1
180 TABLET ORAL
Qty: 90 | Refills: 0 | Status: DISCONTINUED | COMMUNITY
Start: 2018-09-20 | End: 2019-10-24

## 2019-10-24 RX ORDER — LEVETIRACETAM 1000 MG/1
TABLET, FILM COATED ORAL
Refills: 0 | Status: COMPLETED | COMMUNITY
End: 2019-10-24

## 2019-10-24 RX ORDER — ESOMEPRAZOLE MAGNESIUM 40 MG/1
40 CAPSULE, DELAYED RELEASE ORAL
Qty: 90 | Refills: 0 | Status: DISCONTINUED | COMMUNITY
Start: 2018-10-30 | End: 2019-10-24

## 2019-10-24 RX ORDER — LEVETIRACETAM 500 MG/1
500 TABLET, FILM COATED ORAL
Qty: 60 | Refills: 0 | Status: DISCONTINUED | COMMUNITY
Start: 2018-12-20 | End: 2019-10-24

## 2019-10-24 RX ORDER — DEXAMETHASONE 1 MG/1
1 TABLET ORAL
Qty: 60 | Refills: 0 | Status: DISCONTINUED | COMMUNITY
Start: 2019-02-22 | End: 2019-10-24

## 2019-10-24 RX ORDER — DILTIAZEM HYDROCHLORIDE 120 MG/1
120 CAPSULE, EXTENDED RELEASE ORAL
Qty: 30 | Refills: 0 | Status: DISCONTINUED | COMMUNITY
Start: 2018-12-20 | End: 2019-10-24

## 2019-10-24 RX ORDER — CEPHALEXIN 500 MG/1
500 CAPSULE ORAL
Qty: 12 | Refills: 0 | Status: DISCONTINUED | COMMUNITY
Start: 2019-02-05 | End: 2019-10-24

## 2019-10-24 RX ORDER — OXYCODONE 5 MG/1
5 TABLET ORAL
Qty: 30 | Refills: 0 | Status: DISCONTINUED | COMMUNITY
Start: 2019-02-22 | End: 2019-10-24

## 2019-10-24 RX ORDER — DEXAMETHASONE 2 MG/1
2 TABLET ORAL
Qty: 60 | Refills: 0 | Status: DISCONTINUED | COMMUNITY
Start: 2018-12-20 | End: 2019-10-24

## 2019-10-29 PROBLEM — I87.2 VENOUS INSUFFICIENCY: Status: ACTIVE | Noted: 2019-07-16

## 2019-10-29 PROBLEM — C79.31 BRAIN METASTASES: Status: ACTIVE | Noted: 2019-01-02

## 2019-10-29 PROBLEM — G93.6 CEREBRAL EDEMA: Status: ACTIVE | Noted: 2019-04-04

## 2019-10-29 PROBLEM — R60.0 LOWER EXTREMITY EDEMA: Status: ACTIVE | Noted: 2019-01-24

## 2019-10-29 NOTE — REASON FOR VISIT
[Follow-Up: _____] : a [unfilled] follow-up visit [FreeTextEntry1] : Patient has been in usual state of health. Independent, lives alone, drives, uses a cane outside of the home support.\par No headaches, n/v, dizziness. Balance and right sided coordination remains slightly impaired since the original surgery.\par Left parietal surgical site us well healed. No new symptoms to report.\par \par Patient is actively moving residence from [place] to [facility].

## 2019-10-29 NOTE — PHYSICAL EXAM
[Well-Healed] : well-healed [Normal Skin] : normal [Tender] : not tender [Normal Skin Turgor] : skin turgor was normal [Oriented To Time, Place, And Person] : oriented to person, place, and time [Impaired Insight] : insight and judgment were intact [Affect] : the affect was normal [Person] : oriented to person [Place] : oriented to place [Time] : oriented to time [Short Term Intact] : short term memory intact [Remote Intact] : remote memory intact [Span Intact] : the attention span was normal [Concentration Intact] : normal concentrating ability [Fluency] : fluency intact [Comprehension] : comprehension intact [Current Events] : adequate knowledge of current events [Past History] : adequate knowledge of personal past history [Vocabulary] : adequate range of vocabulary [Cranial Nerves Optic (II)] : visual acuity intact bilaterally,  pupils equal round and reactive to light [Cranial Nerves Oculomotor (III)] : extraocular motion intact [Cranial Nerves Trigeminal (V)] : facial sensation intact symmetrically [Cranial Nerves Facial (VII)] : face symmetrical [Cranial Nerves Vestibulocochlear (VIII)] : hearing was intact bilaterally [Cranial Nerves Glossopharyngeal (IX)] : tongue and palate midline [Cranial Nerves Accessory (XI - Cranial And Spinal)] : head turning and shoulder shrug symmetric [Cranial Nerves Hypoglossal (XII)] : there was no tongue deviation with protrusion [Motor Strength] : muscle strength was normal in all four extremities [No Muscle Atrophy] : normal bulk in all four extremities [Sensation Tactile Decrease] : light touch was intact [Romberg's Sign] : Romberg's sign was negtive [Balance] : balance was intact [Limited Balance] : balance was intact [Past-pointing] : there was no past-pointing [Tremor] : no tremor present [2+] : Patella left 2+ [Sclera] : the sclera and conjunctiva were normal [PERRL With Normal Accommodation] : pupils were equal in size, round, reactive to light, with normal accommodation [Extraocular Movements] : extraocular movements were intact [20/___] : left eye 20/[unfilled] [Outer Ear] : the ears and nose were normal in appearance [Oropharynx] : the oropharynx was normal [Neck Appearance] : the appearance of the neck was normal [Neck Cervical Mass (___cm)] : no neck mass was observed [Jugular Venous Distention Increased] : there was no jugular-venous distention [Thyroid Diffuse Enlargement] : the thyroid was not enlarged [Thyroid Nodule] : there were no palpable thyroid nodules [Full Pulse] : the pedal pulses are present [Abnormal Walk] : normal gait [Nail Clubbing] : no clubbing  or cyanosis of the fingernails [Musculoskeletal - Swelling] : no joint swelling seen [Motor Tone] : muscle strength and tone were normal [FreeTextEntry1] : unable to tandem walk. [Skin Color & Pigmentation] : normal skin color and pigmentation [Skin Turgor] : normal skin turgor [] : no rash

## 2019-10-29 NOTE — HISTORY OF PRESENT ILLNESS
[FreeTextEntry1] : PAtient with metastatic renal cell carcinoma diagnosed 16 years ago. No recent active treatment.\par

## 2019-10-29 NOTE — ASSESSMENT
[FreeTextEntry1] : Discussion:\par - Reviewed MRI results: Treated tumor is stable, surgical site is well, New 2mm enhancement in the right occipital.\par - Discussed treatment options with patient- SRS vs observe and wait for growth- larger target for gamma kniofe.\par - Tumor is not causing any symptoms.\par - F?U MRI +/- contrast in 10 weeks

## 2020-01-08 NOTE — PRE-ANESTHESIA EVALUATION ADULT - SPO2 (%)
[address] [facility], [facility]    Physical Therapy Daily Treatment Note  Date:  2020    Patient Name:  Fiona Ng    :  1955  MRN: [id number]  Restrictions/Precautions:    Medical/Treatment Diagnosis Information:  Diagnosis: S10.942 (ICD-10-CM) - Status post total left knee replacement  Treatment Diagnosis: M25.562 Pain in Left Knee  Insurance/Certification information:  PT Insurance Information: [address] [place] [address] [employer]  Physician Information:  Referring Practitioner: Gómez Mojica [id number] (typically 20 minutes face-to-face)  [] EVAL (MOD) 91411 (typically 30 minutes face-to-face)  [] EVAL (HIGH) 99301 (typically 45 minutes face-to-face)  [] RE-EVAL   [x] QF(34503) x  1   [] IONTO  [x] NMR (35584) x  1   [x] VASO 1/8  [] Manual (37502) x       [] Other:  [x] TA x  1    [] Mech Traction (58534)  [] ES(attended) (92787)      [] ES (un) (67527):     Migel Estrada PT, DPT 96

## 2020-01-15 ENCOUNTER — APPOINTMENT (OUTPATIENT)
Dept: NEUROSURGERY | Facility: CLINIC | Age: 85
End: 2020-01-15

## 2020-01-15 ENCOUNTER — APPOINTMENT (OUTPATIENT)
Dept: MRI IMAGING | Facility: IMAGING CENTER | Age: 85
End: 2020-01-15

## 2020-05-10 ENCOUNTER — TRANSCRIPTION ENCOUNTER (OUTPATIENT)
Age: 85
End: 2020-05-10

## 2020-09-08 NOTE — PATIENT PROFILE ADULT - NSASFALLATTEMPTOOB_GEN_A_NUR
DISCUSSED W/PT PAIN MEDICATION, DIET AND POC. PT REQUESTING INCREASED DIET AND
PAIN MEDICATION. DISCUSSED INCREASING FOOD INTAKE VS.PAIN LEVEL. INSTRUCTED PT
TO DISCUSS THIS W/PHYSICIAN IN AM. no

## 2020-10-13 NOTE — ED PROVIDER NOTE - CARE PLAN
2/2 above  pain control w/ fentanyl patch, prn tylenol and IV morphine  if worsens, may need repeat para
Principal Discharge DX:	Anemia, unspecified type

## 2020-11-02 NOTE — ED PROVIDER NOTE - CROS ED NEURO POS
Referral for neuropsychology testing was placed with:  [facility]  [address] [facility] [address]  [place] [address]   Phone: [phone]  Fax: [phone]      Recommendations for adult wellness    Regular exercise regimen:   150 min of cardiovascular exercise per week at minimum or 10,000 steps per day       Dietary Supplementation:  Vitamin D3 3539-8397 units daily through diet; if unable, could take in gel cap form  Calcium - 3 servings of calcium rich foods such as dairy products, dark leafy greens, soy products - daily in your diet  Multivitamin daily if on a restrictive diet  ** Recommend all supplements have USP label ([place] Pharmacopeia)    Diet:  Cook foods in olive oil.  Aim to get 8 glasses of water (8 ozs) every day  Aim to get 4 to 5 servings of vegetables and fruits per day  1/4 cup of unsalted nuts instead of an unhealthy snack raises the HDL and lowers LDL     Other lifestyle:  Apply sunscreen during prolonged sun exposure, at least SPF 15, and reapply every 2-4 hours if prolonged exposure    Health Screening & Maintenance:  Monthly self breast exams  Dental visit every 6 months  Yearly flu vaccine  Cervical cancer screening (Pap smear)due in 2023 if today's is normal    Relaxation/Stress Management:  Figure out what works for you:   yoga, deep breathing, exercise, mindfulness meditation     Health Care Planning:  Complete an Advanced Directive- Health Care Power of   [email or web address]  Bring a copy to the clinic to be put in your chart.       
DIFFICULTY WALKING / IMBALANCE/HEADACHE

## 2021-05-03 NOTE — CONSULT NOTE ADULT - ATTENDING COMMENTS
Clinically stable,f/u care d/w pt. [Fatigue] : fatigue [All other systems negative] : All other systems negative

## 2021-06-17 NOTE — ASSESSMENT
[FreeTextEntry1] : Mrs. Marte returns to the office post gamma knife reporting feeling OK but does have mild left lower leg edema with tenderness in the calf.  Has low grade fever at 100.9.  Pulse oximetry on room air 87-93%.  Sent for lower extremity duplex scan which was negative. 
<<----- Click to add NO significant Past Surgical History

## 2021-08-25 NOTE — ED PROVIDER NOTE - AGGRAVATING FACTORS
5 **ATTENDING ADDENDUM (Dr. Kevin Napoles): NO movement-associated, pleuritic, reproducible, positional, or exertional component to the symptoms.

## 2021-09-15 NOTE — HISTORY OF PRESENT ILLNESS
This is a recent snapshot of the patient's [facility] medical record.  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 On 2/15/19, she returned back to ED for sudden onset of right side numbness and headache. Ommaya was attempted to be tapped but unable to withdraw cystic fluid. On 2/18, she underwent craniotomy for resection. Her hospitalization was c/b b/l LE DVT, PE (s/p IVC filter on 2/17). She was discharged to home on 2/22. \par Today she presents for follow up and states she is doing well in her daughter's house. Her right side numbness were completely resolved and right LE weakness has been improving since the surgery. She started lovenox injection yesterday and denies headache, dizziness, n/v, fever/chills, blurry vision, seizure activity or any other focal neuro deficits.\par Surgical incision appears to be healing well. Staples were removed today without complications.

## 2021-10-06 PROBLEM — I10 ESSENTIAL HYPERTENSION: Status: ACTIVE | Noted: 2018-12-14

## 2021-10-11 NOTE — ED ADULT NURSE NOTE - CAS EDN INTEG ASSESS
Ice 20 minutes 3-4 times per day for swelling, pain or after exercise.  Over the counter Voltaren or Diclofenac anti inflammatory gel.  Apply to skin over area of pain 4 times daily for 7-10 days.  Avoid any painful activity or exercise.  Aleve or Naprosyn- 1-2 tablets twice per day with food for 1 week then as needed; stop if any GI upset or black/bloody stools.  Home exercises as given today- do rehabilitative exercises daily.  Physical therapy was ordered.  They will contact you to schedule.  Follow up as needed or if your pain doesn't improve as expected.    
WDL

## 2021-11-22 NOTE — ED ADULT TRIAGE NOTE - MODE OF ARRIVAL
Walk in Bcc Infiltrative Histology Text: There were numerous aggregates of basaloid cells demonstrating an infiltrative pattern.

## 2022-07-18 NOTE — ED ADULT NURSE NOTE - SCORE
1 Topical Ketoconazole Counseling: Patient counseled that this medication may cause skin irritation or allergic reactions.  In the event of skin irritation, the patient was advised to reduce the amount of the drug applied or use it less frequently.   The patient verbalized understanding of the proper use and possible adverse effects of ketoconazole.  All of the patient's questions and concerns were addressed.

## 2022-10-07 NOTE — ED PROVIDER NOTE - DATE/TIME 1
Due to slow heart rate, stop taking Tenerotic and instead take chlorthalidone 25 mg daily. Also, due to low blood pressure reduce Amlodipine (Norvasc) from 10 mg daily to 5 mg daily. Return to the office in 2 months. 09-Apr-2019 07:40

## 2022-10-26 NOTE — PATIENT PROFILE ADULT - OVER THE PAST TWO WEEKS, HAVE YOU FELT LITTLE INTEREST OR PLEASURE IN DOING THINGS?
UPDATE: NASIMA spoke with Cheryl Anya who reported she has not hear back from [facility] at this time. Estefani to follow up tomorrow morning and keep CM informed. Electronically signed by JOSÉ Serrano on 10/26/2022 at 4:10 PM  [phone]    NASIMA following: NASIMA met with pt at bedside this AM. NASIMA informed pt the [place] [id number] liaison is still working to identify a [place] in [place]. NASIMA spoke with [facility] with [facility] who reported this morning that she has not had any providers accept the pt referral in [place]. NASIMA reached out to pt's PCP, Monet Montes at [phone] who stated they often work with [facility] [phone]. NASIMA provided this information to [facility] with [facility] who will reach out to this provider to make a referral. NASIMA will continue to follow for discharge recs.   Electronically signed by JOSÉ Serrano on 10/26/2022 at 1:34 PM  [phone] no

## 2022-11-21 NOTE — ED ADULT NURSE NOTE - EENT WDL
Eyes with no visual disturbances.  Ears clean and dry and no hearing difficulties. Nose with pink mucosa and no drainage.  Mouth mucous membranes moist and pink.  No tenderness or swelling to throat or neck. Heterosexual

## 2022-12-29 NOTE — PRE-ANESTHESIA EVALUATION ADULT - NSANTHALCOHOLSD_GEN_ALL_CORE
My Asthma Action Plan    Name: Yaya Mg   YOB: 1975  Date: 12/29/2022   My doctor: María López CNP   My clinic: [facility]        My Control Medicine: Budesonide + formoterol (Symbicort HFA) -  80/4.5 mcg PER ORDER  My Rescue Medicine: Albuterol (Proair RespiClick) AS ORDERED   My Asthma Severity:   Intermittent / Exercise Induced  Know your asthma triggers: woking out, cold air.                  GREEN ZONE   Good Control    I feel good    No cough or wheeze    Can work, sleep and play without asthma symptoms       Take your asthma control medicine every day.     1. If exercise triggers your asthma, take your rescue medication    15 minutes before exercise or sports, and    During exercise if you have asthma symptoms  2. Spacer to use with inhaler: If you have a spacer, make sure to use it with your inhaler             YELLOW ZONE Getting Worse  I have ANY of these:    I do not feel good    Cough or wheeze    Chest feels tight    Wake up at night   1. Keep taking your Green Zone medications  2. Start taking your rescue medicine:    every 20 minutes for up to 1 hour. Then every 4 hours for 24-48 hours.  3. If you stay in the Yellow Zone for more than 12-24 hours, contact your doctor.  4. If you do not return to the Green Zone in 12-24 hours or you get worse, start taking your oral steroid medicine if prescribed by your provider.           RED ZONE Medical Alert - Get Help  I have ANY of these:    I feel awful    Medicine is not helping    Breathing getting harder    Trouble walking or talking    Nose opens wide to breathe       1. Take your rescue medicine NOW  2. If your provider has prescribed an oral steroid medicine, start taking it NOW  3. Call your doctor NOW  4. If you are still in the Red Zone after 20 minutes and you have not reached your doctor:    Take your rescue medicine again and    Call 911 or go to the emergency room right away    See your  regular doctor within 2 weeks of an Emergency Room or Urgent Care visit for follow-up treatment.          Annual Reminders:  Meet with Asthma Educator,  Flu Shot in the Fall, consider Pneumonia Vaccination for patients with asthma (aged 19 and older).    Pharmacy:    [facility] [id number] [place] [facility] - [address] [facility] - [place], [place] - [address]  [facility] - [place], [place] - [address] [place] [address] 1ST FLOOR    Electronically signed by María López CNP   Date: 12/29/22                      Asthma Triggers  How To Control Things That Make Your Asthma Worse    Triggers are things that make your asthma worse.  Look at the list below to help you find your triggers and what you can do about them.  You can help prevent asthma flare-ups by staying away from your triggers.      Trigger                                                          What you can do   Cigarette Smoke  Tobacco smoke can make asthma worse. Do not allow smoking in your home, car or around you.  Be sure no one smokes at a child s day care or school.  If you smoke, ask your health care provider for ways to help you quit.  Ask family members to quit too.  Ask your health care provider for a referral to Quit Plan to help you quit smoking, or call 7-132-387-PLAN.     Colds, Flu, Bronchitis  These are common triggers of asthma. Wash your hands often.  Don t touch your eyes, nose or mouth.  Get a flu shot every year.     Dust Mites  These are tiny bugs that live in cloth or carpet. They are too small to see. Wash sheets and blankets in hot water every week.   Encase pillows and mattress in dust mite proof covers.  Avoid having carpet if you can. If you have carpet, vacuum weekly.   Use a dust mask and HEPA vacuum.   Pollen and Outdoor Mold  Some people are allergic to trees, grass, or weed pollen, or molds. Try to keep your windows closed.  Limit time out doors when pollen count is high.   Ask you  health care provider about taking medicine during allergy season.     Animal Dander  Some people are allergic to skin flakes, urine or saliva from pets with fur or feathers. Keep pets with fur or feathers out of your home.    If you can t keep the pet outdoors, then keep the pet out of your bedroom.  Keep the bedroom door closed.  Keep pets off cloth furniture and away from stuffed toys.     Mice, Rats, and Cockroaches   Some people are allergic to the waste from these pests.   Cover food and garbage.  Clean up spills and food crumbs.  Store grease in the refrigerator.   Keep food out of the bedroom.   Indoor Mold  This can be a trigger if your home has high moisture. Fix leaking faucets, pipes, or other sources of water.   Clean moldy surfaces.  Dehumidify basement if it is damp and smelly.   Smoke, Strong Odors, and Sprays  These can reduce air quality. Stay away from strong odors and sprays, such as perfume, powder, hair spray, paints, smoke incense, paint, cleaning products, candles and new carpet.   Exercise or Sports  Some people with asthma have this trigger. Be active!  Ask your doctor about taking medicine before sports or exercise to prevent symptoms.    Warm up for 5-10 minutes before and after sports or exercise.     Other Triggers of Asthma  Cold air:  Cover your nose and mouth with a scarf.  Sometimes laughing or crying can be a trigger.  Some medicines and food can trigger asthma.        No

## 2023-04-03 NOTE — PROGRESS NOTE ADULT - SUBJECTIVE AND OBJECTIVE BOX
Visit Summary: Patient seen and evaluated at bedside.    Overnight Events: none    Exam:  T(C): 36.8 (02-18-19 @ 22:00), Max: 36.8 (02-18-19 @ 22:00)  HR: 72 (02-19-19 @ 02:00) (72 - 90)  BP: 130/63 (02-19-19 @ 02:00) (101/62 - 155/73)  RR: 15 (02-19-19 @ 02:00) (14 - 18)  SpO2: 97% (02-19-19 @ 02:00) (88% - 100%)  Wt(kg): --    AOx3, FC, PERRL, EOMI, no facial   5/5 throughout, no drift  SILT  no clonus    VF grossly full                        8.3    11.1  )-----------( 247      ( 18 Feb 2019 15:18 )             26.6     02-18    142  |  108  |  20  ----------------------------<  137<H>  4.3   |  21<L>  |  0.71    Ca    8.2<L>      18 Feb 2019 15:18    TPro  6.5  /  Alb  3.3  /  TBili  0.1<L>  /  DBili  x   /  AST  10  /  ALT  12  /  AlkPhos  77  02-18  PT/INR - ( 17 Feb 2019 09:04 )   PT: 11.2 sec;   INR: 0.98 ratio         PTT - ( 17 Feb 2019 09:04 )  PTT:24.4 sec Topical Retinoid counseling:  Patient advised to apply a pea-sized amount only at bedtime and wait 30 minutes after washing their face before applying.  If too drying, patient may add a non-comedogenic moisturizer. The patient verbalized understanding of the proper use and possible adverse effects of retinoids.  All of the patient's questions and concerns were addressed.

## 2023-08-24 NOTE — ED PROVIDER NOTE - SKIN NEGATIVE STATEMENT, MLM
B LE appears to be 3+/5 all planes no abrasions, no jaundice, no lesions, no pruritis, and no rashes.
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
## 2024-08-27 NOTE — DISCHARGE NOTE PROVIDER - REASON FOR ADMISSION
No protocol for requested medication.    Medication: aderall and vyvanse  Last office visit date: 3/6/2024  Pharmacy: [id number] [place] [address] [address], [address]   amphetamine-dextroamphetamine (ADDERALL) 10 MG tablet          Possible duplicate: Hover to review recent actions on this medication    Sig: Take 1 tablet by mouth 3 times daily.    Disp: 90 tablet    Refills: 0    Start: 8/25/2024    Earliest Fill Date: 8/25/2024    Class: Eprescribe    PDMP Review May Be Needed    Non-formulary For: ADHD (attention deficit hyperactivity disorder), combined type    Last ordered: 1 month ago (7/26/2024) by Goyo Catherine MD    Controlled Substances Refill Protocol - 12 Month Protocol - ALWAYS forward to ordering provider [id number]/26/2024 04:28 PM   Protocol Details FORWARD TO PROVIDER - Refill requests for these medications must ALWAYS be forwarded to the ordering provider, even if all criteria are met    PDMP has been reviewed in last 24 hours    Urine/serum drug screen on file in the appropriate time frame    Active/up-to-date controlled substances agreement/consent on file    Medication (including dose and sig) on current med list    Seen by prescribing provider or same department within the last 6 months or has a future appt in 6 months - IF FAILED PLEASE LOOK AT CHART REVIEW FOR LAST VISIT AND PROCEED ACCORDINGLY       lisdexamfetamine (Vyvanse) 60 MG capsule          Possible duplicate: Hover to review recent actions on this medication    Sig: Take 1 capsule by mouth every morning.    Disp: 30 capsule    Refills: 0    Start: 8/25/2024    Earliest Fill Date: 8/25/2024    Class: Eprescribe    PDMP Review May Be Needed    Non-formulary For: ADHD (attention deficit hyperactivity disorder), combined type    Last ordered: 1 month ago (7/26/2024) by Goyo Catherine MD    Controlled Substances Refill Protocol - 12 Month Protocol - ALWAYS forward to ordering provider [email or web address]/26/2024 04:28 PM    Protocol Details FORWARD TO PROVIDER - Refill requests for these medications must ALWAYS be forwarded to the ordering provider, even if all criteria are met    PDMP has been reviewed in last 24 hours    Urine/serum drug screen on file in the appropriate time frame    Active/up-to-date controlled substances agreement/consent on file    Medication (including dose and sig) on current med list    Seen by prescribing provider or same department within the last 6 months or has a future appt in 6 months - IF FAILED PLEASE LOOK AT CHART REVIEW FOR LAST VISIT AND PROCEED ACCORDINGLY      To be filled at: [id number] - [facility] [address], [address]            Order pended, routed to clinician for review.    Symptomatic Anemia

## 2025-07-25 NOTE — H&P ADULT - REASON FOR ADMISSION
Department of Anesthesiology  Postprocedure Note    Patient: Linda John  MRN: [id number]  YOB: 1979  Date of evaluation: 7/25/2025    Procedure Summary       Date: 07/25/25 Room / Location: [address]    Anesthesia Start: 0955 Anesthesia Stop: 1007    Procedure: COLORECTAL CANCER SCREENING, NOT HIGH RISK             ++IODINE ALLERGY++ Diagnosis:       Screen for colon cancer      (Screen for colon cancer [Z12.11])    Surgeons: Chanel Green MD Responsible Provider: Margo Adames DO    Anesthesia Type: MAC ASA Status: 2            Anesthesia Type: MAC    Chantell Phase I: Chantell Score: 10    Chantell Phase II:      Anesthesia Post Evaluation    Patient location during evaluation: PACU  Patient participation: complete - patient participated  Level of consciousness: awake and alert  Nausea & Vomiting: no vomiting and no nausea  Cardiovascular status: hemodynamically stable  Respiratory status: acceptable and spontaneous ventilation  Hydration status: stable  Pain management: adequate    No notable events documented.  
Brain mets